# Patient Record
Sex: FEMALE | Race: WHITE | Employment: OTHER | URBAN - METROPOLITAN AREA
[De-identification: names, ages, dates, MRNs, and addresses within clinical notes are randomized per-mention and may not be internally consistent; named-entity substitution may affect disease eponyms.]

---

## 2017-02-10 ENCOUNTER — HOSPITAL ENCOUNTER (OUTPATIENT)
Dept: MAMMOGRAPHY | Age: 67
Discharge: HOME | End: 2017-02-10
Attending: FAMILY MEDICINE | Admitting: FAMILY MEDICINE

## 2017-02-10 DIAGNOSIS — Z12.31 ENCOUNTER FOR SCREENING MAMMOGRAM FOR BREAST CANCER: ICD-10-CM

## 2017-02-23 ENCOUNTER — OFFICE VISIT (OUTPATIENT)
Dept: FAMILY MEDICINE CLINIC | Age: 67
End: 2017-02-23

## 2017-02-23 VITALS
HEART RATE: 77 BPM | OXYGEN SATURATION: 98 % | BODY MASS INDEX: 31.69 KG/M2 | HEIGHT: 65 IN | DIASTOLIC BLOOD PRESSURE: 80 MMHG | SYSTOLIC BLOOD PRESSURE: 134 MMHG | RESPIRATION RATE: 16 BRPM | TEMPERATURE: 98 F | WEIGHT: 190.2 LBS

## 2017-02-23 DIAGNOSIS — D36.9 ADENOMATOUS POLYP: ICD-10-CM

## 2017-02-23 DIAGNOSIS — E78.00 PURE HYPERCHOLESTEROLEMIA: Chronic | ICD-10-CM

## 2017-02-23 DIAGNOSIS — I27.21 PAH (PULMONARY ARTERY HYPERTENSION) (HCC): ICD-10-CM

## 2017-02-23 DIAGNOSIS — I10 ESSENTIAL HYPERTENSION: Chronic | ICD-10-CM

## 2017-02-23 DIAGNOSIS — K21.9 GASTROESOPHAGEAL REFLUX DISEASE, ESOPHAGITIS PRESENCE NOT SPECIFIED: ICD-10-CM

## 2017-02-23 DIAGNOSIS — I73.00 RAYNAUD'S DISEASE WITHOUT GANGRENE: ICD-10-CM

## 2017-02-23 DIAGNOSIS — K57.30 SIGMOID DIVERTICULOSIS: ICD-10-CM

## 2017-02-23 DIAGNOSIS — E55.9 VITAMIN D DEFICIENCY: ICD-10-CM

## 2017-02-23 DIAGNOSIS — M34.1 CREST SYNDROME (HCC): ICD-10-CM

## 2017-02-23 DIAGNOSIS — M85.80 OSTEOPENIA: ICD-10-CM

## 2017-02-23 DIAGNOSIS — Z00.00 WELL ADULT EXAM: Primary | ICD-10-CM

## 2017-02-23 PROCEDURE — 99397 PER PM REEVAL EST PAT 65+ YR: CPT | Performed by: FAMILY MEDICINE

## 2017-02-23 RX ORDER — FUROSEMIDE 20 MG/1
20 TABLET ORAL DAILY PRN
Qty: 90 TABLET | Refills: 0 | Status: SHIPPED | OUTPATIENT
Start: 2017-02-23 | End: 2021-02-19 | Stop reason: SDUPTHER

## 2017-03-09 ENCOUNTER — OFFICE VISIT (OUTPATIENT)
Dept: RHEUMATOLOGY | Age: 67
End: 2017-03-09

## 2017-03-09 VITALS
SYSTOLIC BLOOD PRESSURE: 130 MMHG | BODY MASS INDEX: 31.69 KG/M2 | WEIGHT: 189 LBS | HEART RATE: 74 BPM | DIASTOLIC BLOOD PRESSURE: 84 MMHG

## 2017-03-09 DIAGNOSIS — M34.1 CREST SYNDROME (HCC): Primary | Chronic | ICD-10-CM

## 2017-03-09 PROCEDURE — 99213 OFFICE O/P EST LOW 20 MIN: CPT | Performed by: INTERNAL MEDICINE

## 2017-06-20 ENCOUNTER — OFFICE VISIT (OUTPATIENT)
Dept: CARDIOLOGY CLINIC | Age: 67
End: 2017-06-20

## 2017-06-20 ENCOUNTER — HOSPITAL ENCOUNTER (OUTPATIENT)
Dept: OTHER | Age: 67
Discharge: HOME | End: 2017-06-20
Attending: INTERNAL MEDICINE | Admitting: INTERNAL MEDICINE

## 2017-06-20 VITALS
WEIGHT: 184.74 LBS | SYSTOLIC BLOOD PRESSURE: 124 MMHG | HEIGHT: 65 IN | OXYGEN SATURATION: 100 % | HEART RATE: 84 BPM | DIASTOLIC BLOOD PRESSURE: 70 MMHG | BODY MASS INDEX: 30.78 KG/M2

## 2017-06-20 DIAGNOSIS — M34.1 CREST SYNDROME (HCC): Chronic | ICD-10-CM

## 2017-06-20 DIAGNOSIS — E78.00 PURE HYPERCHOLESTEROLEMIA: Chronic | ICD-10-CM

## 2017-06-20 DIAGNOSIS — R06.02 SOB (SHORTNESS OF BREATH): ICD-10-CM

## 2017-06-20 DIAGNOSIS — I27.21 PAH (PULMONARY ARTERY HYPERTENSION) (HCC): Chronic | ICD-10-CM

## 2017-06-20 DIAGNOSIS — I27.21 PAH (PULMONARY ARTERY HYPERTENSION) (HCC): Primary | Chronic | ICD-10-CM

## 2017-06-20 DIAGNOSIS — I10 ESSENTIAL HYPERTENSION: Chronic | ICD-10-CM

## 2017-06-20 LAB
A/G RATIO: 1.7 (ref 1.1–2.2)
ALBUMIN SERPL-MCNC: 4.5 G/DL (ref 3.4–5)
ALP BLD-CCNC: 81 U/L (ref 40–129)
ALT SERPL-CCNC: 17 U/L (ref 10–40)
ANION GAP SERPL CALCULATED.3IONS-SCNC: 17 MMOL/L (ref 3–16)
AST SERPL-CCNC: 19 U/L (ref 15–37)
BILIRUB SERPL-MCNC: 0.3 MG/DL (ref 0–1)
BUN BLDV-MCNC: 12 MG/DL (ref 7–20)
CALCIUM SERPL-MCNC: 10 MG/DL (ref 8.3–10.6)
CHLORIDE BLD-SCNC: 101 MMOL/L (ref 99–110)
CHOLESTEROL, TOTAL: 144 MG/DL (ref 0–199)
CO2: 27 MMOL/L (ref 21–32)
CREAT SERPL-MCNC: 0.7 MG/DL (ref 0.6–1.2)
GFR AFRICAN AMERICAN: >60
GFR NON-AFRICAN AMERICAN: >60
GLOBULIN: 2.6 G/DL
GLUCOSE BLD-MCNC: 99 MG/DL (ref 70–99)
HCT VFR BLD CALC: 40 % (ref 36–48)
HDLC SERPL-MCNC: 66 MG/DL (ref 40–60)
HEMOGLOBIN: 12.9 G/DL (ref 12–16)
LDL CHOLESTEROL CALCULATED: 62 MG/DL
MCH RBC QN AUTO: 28.3 PG (ref 26–34)
MCHC RBC AUTO-ENTMCNC: 32.1 G/DL (ref 31–36)
MCV RBC AUTO: 88 FL (ref 80–100)
PDW BLD-RTO: 14.3 % (ref 12.4–15.4)
PLATELET # BLD: 169 K/UL (ref 135–450)
PMV BLD AUTO: 9.9 FL (ref 5–10.5)
POTASSIUM SERPL-SCNC: 3.9 MMOL/L (ref 3.5–5.1)
PRO-BNP: 140 PG/ML (ref 0–124)
RBC # BLD: 4.55 M/UL (ref 4–5.2)
SODIUM BLD-SCNC: 145 MMOL/L (ref 136–145)
TOTAL PROTEIN: 7.1 G/DL (ref 6.4–8.2)
TRIGL SERPL-MCNC: 80 MG/DL (ref 0–150)
VLDLC SERPL CALC-MCNC: 16 MG/DL
WBC # BLD: 5.4 K/UL (ref 4–11)

## 2017-06-20 PROCEDURE — 99214 OFFICE O/P EST MOD 30 MIN: CPT | Performed by: INTERNAL MEDICINE

## 2017-07-26 ENCOUNTER — TELEPHONE (OUTPATIENT)
Dept: CARDIOLOGY CLINIC | Age: 67
End: 2017-07-26

## 2017-07-26 DIAGNOSIS — I73.00 RAYNAUD'S DISEASE WITHOUT GANGRENE: ICD-10-CM

## 2017-07-26 DIAGNOSIS — R06.02 SOB (SHORTNESS OF BREATH): ICD-10-CM

## 2017-07-26 DIAGNOSIS — I27.21 PAH (PULMONARY ARTERY HYPERTENSION) (HCC): ICD-10-CM

## 2017-07-26 DIAGNOSIS — I10 ESSENTIAL HYPERTENSION: ICD-10-CM

## 2017-07-26 DIAGNOSIS — M34.1 CREST SYNDROME (HCC): ICD-10-CM

## 2017-07-26 DIAGNOSIS — E78.00 PURE HYPERCHOLESTEROLEMIA: ICD-10-CM

## 2017-07-26 RX ORDER — AMLODIPINE BESYLATE 5 MG/1
5 TABLET ORAL DAILY
Qty: 90 TABLET | Refills: 3 | Status: SHIPPED | OUTPATIENT
Start: 2017-07-26 | End: 2018-10-16 | Stop reason: SDUPTHER

## 2017-08-10 ENCOUNTER — OFFICE VISIT (OUTPATIENT)
Dept: FAMILY MEDICINE CLINIC | Age: 67
End: 2017-08-10

## 2017-08-10 VITALS
WEIGHT: 184.2 LBS | RESPIRATION RATE: 16 BRPM | BODY MASS INDEX: 30.69 KG/M2 | SYSTOLIC BLOOD PRESSURE: 118 MMHG | OXYGEN SATURATION: 96 % | HEART RATE: 90 BPM | DIASTOLIC BLOOD PRESSURE: 72 MMHG | HEIGHT: 65 IN

## 2017-08-10 DIAGNOSIS — B96.89 ACUTE BACTERIAL SINUSITIS: Primary | ICD-10-CM

## 2017-08-10 DIAGNOSIS — J01.90 ACUTE BACTERIAL SINUSITIS: Primary | ICD-10-CM

## 2017-08-10 PROCEDURE — 99213 OFFICE O/P EST LOW 20 MIN: CPT | Performed by: REGISTERED NURSE

## 2017-08-10 RX ORDER — AMOXICILLIN AND CLAVULANATE POTASSIUM 875; 125 MG/1; MG/1
1 TABLET, FILM COATED ORAL 2 TIMES DAILY WITH MEALS
Qty: 20 TABLET | Refills: 0 | Status: SHIPPED | OUTPATIENT
Start: 2017-08-10 | End: 2017-08-20

## 2017-08-10 ASSESSMENT — ENCOUNTER SYMPTOMS
RHINORRHEA: 1
CHEST TIGHTNESS: 0
WHEEZING: 0
SORE THROAT: 1
SHORTNESS OF BREATH: 0
TROUBLE SWALLOWING: 0
SINUS PRESSURE: 1
COUGH: 1

## 2017-08-10 ASSESSMENT — PATIENT HEALTH QUESTIONNAIRE - PHQ9
1. LITTLE INTEREST OR PLEASURE IN DOING THINGS: 0
SUM OF ALL RESPONSES TO PHQ QUESTIONS 1-9: 0
2. FEELING DOWN, DEPRESSED OR HOPELESS: 0
SUM OF ALL RESPONSES TO PHQ9 QUESTIONS 1 & 2: 0

## 2017-09-14 ENCOUNTER — OFFICE VISIT (OUTPATIENT)
Dept: RHEUMATOLOGY | Age: 67
End: 2017-09-14

## 2017-09-14 VITALS
BODY MASS INDEX: 30.52 KG/M2 | DIASTOLIC BLOOD PRESSURE: 70 MMHG | WEIGHT: 183.4 LBS | HEART RATE: 68 BPM | SYSTOLIC BLOOD PRESSURE: 122 MMHG

## 2017-09-14 DIAGNOSIS — M34.1 CREST SYNDROME (HCC): Primary | Chronic | ICD-10-CM

## 2017-09-14 PROCEDURE — 99213 OFFICE O/P EST LOW 20 MIN: CPT | Performed by: INTERNAL MEDICINE

## 2017-11-02 DIAGNOSIS — I10 ESSENTIAL HYPERTENSION: ICD-10-CM

## 2017-11-02 DIAGNOSIS — I27.21 PAH (PULMONARY ARTERY HYPERTENSION) (HCC): ICD-10-CM

## 2017-11-02 DIAGNOSIS — E78.00 PURE HYPERCHOLESTEROLEMIA: ICD-10-CM

## 2017-11-02 DIAGNOSIS — R06.02 SOB (SHORTNESS OF BREATH): ICD-10-CM

## 2017-11-02 DIAGNOSIS — M34.1 CREST SYNDROME (HCC): ICD-10-CM

## 2017-11-02 DIAGNOSIS — I73.00 RAYNAUD'S DISEASE WITHOUT GANGRENE: ICD-10-CM

## 2017-11-02 RX ORDER — VALSARTAN AND HYDROCHLOROTHIAZIDE 160; 25 MG/1; MG/1
1 TABLET ORAL DAILY
Qty: 90 TABLET | Refills: 1 | Status: SHIPPED | OUTPATIENT
Start: 2017-11-02 | End: 2018-06-05 | Stop reason: SDUPTHER

## 2017-11-02 RX ORDER — ATORVASTATIN CALCIUM 10 MG/1
10 TABLET, FILM COATED ORAL DAILY
Qty: 90 TABLET | Refills: 1 | Status: SHIPPED | OUTPATIENT
Start: 2017-11-02 | End: 2018-06-05 | Stop reason: SDUPTHER

## 2017-12-21 ENCOUNTER — OFFICE VISIT (OUTPATIENT)
Dept: CARDIOLOGY CLINIC | Age: 67
End: 2017-12-21

## 2017-12-21 ENCOUNTER — HOSPITAL ENCOUNTER (OUTPATIENT)
Dept: NON INVASIVE DIAGNOSTICS | Age: 67
Discharge: HOME | End: 2017-12-21
Attending: INTERNAL MEDICINE | Admitting: INTERNAL MEDICINE

## 2017-12-21 VITALS
HEIGHT: 65 IN | HEART RATE: 74 BPM | BODY MASS INDEX: 31.09 KG/M2 | DIASTOLIC BLOOD PRESSURE: 68 MMHG | RESPIRATION RATE: 16 BRPM | WEIGHT: 186.6 LBS | SYSTOLIC BLOOD PRESSURE: 130 MMHG | OXYGEN SATURATION: 98 %

## 2017-12-21 DIAGNOSIS — I10 ESSENTIAL HYPERTENSION: ICD-10-CM

## 2017-12-21 DIAGNOSIS — M34.1 CREST SYNDROME (HCC): ICD-10-CM

## 2017-12-21 DIAGNOSIS — E55.9 VITAMIN D INSUFFICIENCY: ICD-10-CM

## 2017-12-21 DIAGNOSIS — I27.21 PAH (PULMONARY ARTERY HYPERTENSION) (HCC): Primary | ICD-10-CM

## 2017-12-21 DIAGNOSIS — R06.02 SOB (SHORTNESS OF BREATH): ICD-10-CM

## 2017-12-21 DIAGNOSIS — I73.00 RAYNAUD'S DISEASE WITHOUT GANGRENE: ICD-10-CM

## 2017-12-21 DIAGNOSIS — E78.00 PURE HYPERCHOLESTEROLEMIA: ICD-10-CM

## 2017-12-21 LAB
LV EF: 60 %
LVEF MODALITY: NORMAL

## 2017-12-21 PROCEDURE — 99214 OFFICE O/P EST MOD 30 MIN: CPT | Performed by: INTERNAL MEDICINE

## 2017-12-21 NOTE — PROGRESS NOTES
Administered Date(s) Administered    Influenza Virus Vaccine 10/15/2014, 11/30/2015    Influenza, High Dose 12/12/2016    Pneumococcal 13-valent Conjugate ([id number]) 08/13/2015    Pneumococcal Polysaccharide ([id number]) 04/26/2013, 12/12/2016    Tdap (Boostrix, Adacel) 04/26/2013    Zoster 06/11/2013       Patient Active Problem List   Diagnosis    Pure hypercholesterolemia    Raynauds syndrome    GERD (gastroesophageal reflux disease)    Hypertension    CREST syndrome ([address]    PAH (pulmonary artery hypertension)    Vitamin D deficiency    Osteopenia    Sigmoid diverticulosis    Adenomatous polyp       Past Medical History:   Diagnosis Date    Adenomatous polyp 7/17/2015    negative for high grade dysplasiaManegold    CREST syndrome ([address]     Hypertension     Osteopenia 7/22/10    LS -1.15    Pure hypercholesterolemia     Raynauds syndrome     [place]    Sigmoid diverticulosis 7/17/2015    [place]    Vitamin D deficiency      Past Surgical History:   Procedure Laterality Date    BREAST BIOPSY  2008    right breast    CARDIAC CATHETERIZATION  11/27/12    Right Heart Cath     Family History   Problem Relation Age of Onset    Heart Disease Mother 80     coronary stents    Cancer Father 46     bladder cancer, he was a smoker    High Cholesterol Sister      Social History     Social History    Marital status:      Spouse name: N/A    Number of children: N/A    Years of education: N/A     Occupational History    Not on file. Social History Main Topics    Smoking status: Never Smoker    Smokeless tobacco: Never Used      Comment: avoid tobacco    Alcohol use 0.0 oz/week      Comment: Has an occasional drink on holidays.  Drug use: No    Sexual activity: Yes     Partners: Male     Other Topics Concern    Not on file     Social History Narrative    No narrative on file     Review of Systems:   · Constitutional: there has been no unanticipated weight loss.  There's H2O.  · Regular rate and rhythm, normal W9J5, 2/6 systolic murmur at USB, no g/r/c  · Peripheral pulses are symmetrical and full  · There is no clubbing, cyanosis of the extremities. · No edema in ankles  · Pedal Pulses: 2+ and equal   Abdomen:  · No masses or tenderness  · Liver/Spleen: No Abnormalities Noted  Neurological/Psychiatric:  · Alert and oriented in all spheres  · Moves all extremities well  · Exhibits normal gait balance and coordination  · No abnormalities of mood, affect, memory, mentation, or behavior are noted    Lab Data:  CBC:   Lab Results   Component Value Date    WBC 5.4 06/20/2017    WBC 5.7 12/02/2016    WBC 6.0 05/24/2016    RBC 4.55 06/20/2017    RBC 4.35 12/02/2016    RBC 4.55 05/24/2016    HGB 12.9 06/20/2017    HGB 12.6 12/02/2016    HGB 13.0 05/24/2016    HCT 40.0 06/20/2017    HCT 38.0 12/02/2016    HCT 39.6 05/24/2016    MCV 88.0 06/20/2017    MCV 87.4 12/02/2016    MCV 87.0 05/24/2016    RDW 14.3 06/20/2017    RDW 13.7 12/02/2016    RDW 14.0 05/24/2016     06/20/2017     12/02/2016     05/24/2016     BMP:   Lab Results   Component Value Date     06/20/2017     12/02/2016     05/24/2016    K 3.9 06/20/2017    K 4.1 12/02/2016    K 3.8 05/24/2016     06/20/2017     12/02/2016     05/24/2016    CO2 27 06/20/2017    CO2 27 12/02/2016    CO2 29 05/24/2016    PHOS 3.9 11/29/2012    BUN 12 06/20/2017    BUN 14 12/02/2016    BUN 15 05/24/2016    CREATININE 0.7 06/20/2017    CREATININE 0.7 12/02/2016    CREATININE 0.7 09/09/2016     BNP:   Lab Results   Component Value Date    BNP 40 03/03/2014    BNP 28 11/29/2012    BNP 52 08/10/2012     FASTING LIPID PANEL:  Lab Results   Component Value Date    CHOL 144 06/20/2017    HDL 66 06/20/2017    HDL 65 01/23/2012    TRIG 80 06/20/2017     Assessment:  1. PAH- 5/23/12 echo: Normal LV systolic function. Mild mitral and tricuspid regurgitation.  Elevated pulmonary pressures with an estimated right ventricular systolic pressure of 48 mmHg. 76/1  RVSP 42mmHg      6 minute vergara walk distance > 450 m 3/13  On 8/28/2015 echo RVSP is 35 mg HG was on Letaris but stopped due to swelling and does not want to take another medication in it's place. She is stable. Echo 12/16 was 55% RVSP 34  11/26/9/12 RHC- mild PAH. -no ankle swelling  2. CREST- 11/26/12 RHC, recommended starting Endothelin antagonist for treatment associated with CREST syndrome. 3. HTN-   /68 (Site: Right Arm, Position: Sitting, Cuff Size: Medium Adult)   Pulse 74   Resp 16   Ht 5' 5\" (1.651 m)   Wt 186 lb 9.6 oz (84.6 kg)   SpO2 98%   BMI 31.05 kg/m²  Stable BP and HR.  4. Hyperlipidemia- Currently takes Lipitor stable . 5. Raynaud's Syndrome- Followed by Dr. Orlando Salazar. Plan:   1. Same medications. 2. Labs today lipid, cbc, cmp, vit d and bnp.   3. RTO in 12 months with Echo same day. 4. Call with any concerns to the CHF hotline [phone]. I appreciate the opportunity of cooperating in the care of this individual.    Mary Anne Jeffrey MD.

## 2018-01-23 ENCOUNTER — HOSPITAL ENCOUNTER (OUTPATIENT)
Dept: OTHER | Age: 68
Discharge: HOME | End: 2018-01-23
Attending: INTERNAL MEDICINE | Admitting: INTERNAL MEDICINE

## 2018-01-23 DIAGNOSIS — E55.9 VITAMIN D INSUFFICIENCY: ICD-10-CM

## 2018-01-23 DIAGNOSIS — R06.02 SOB (SHORTNESS OF BREATH): ICD-10-CM

## 2018-01-23 DIAGNOSIS — M34.1 CREST SYNDROME (HCC): ICD-10-CM

## 2018-01-23 DIAGNOSIS — I73.00 RAYNAUD'S DISEASE WITHOUT GANGRENE: ICD-10-CM

## 2018-01-23 DIAGNOSIS — I10 ESSENTIAL HYPERTENSION: ICD-10-CM

## 2018-01-23 DIAGNOSIS — I27.21 PAH (PULMONARY ARTERY HYPERTENSION) (HCC): ICD-10-CM

## 2018-01-23 DIAGNOSIS — E78.00 PURE HYPERCHOLESTEROLEMIA: ICD-10-CM

## 2018-01-23 LAB
A/G RATIO: 1.4 (ref 1.1–2.2)
ALBUMIN SERPL-MCNC: 4.2 G/DL (ref 3.4–5)
ALP BLD-CCNC: 70 U/L (ref 40–129)
ALT SERPL-CCNC: 15 U/L (ref 10–40)
ANION GAP SERPL CALCULATED.3IONS-SCNC: 10 MMOL/L (ref 3–16)
AST SERPL-CCNC: 17 U/L (ref 15–37)
BILIRUB SERPL-MCNC: 0.4 MG/DL (ref 0–1)
BUN BLDV-MCNC: 13 MG/DL (ref 7–20)
CALCIUM SERPL-MCNC: 9.8 MG/DL (ref 8.3–10.6)
CHLORIDE BLD-SCNC: 103 MMOL/L (ref 99–110)
CHOLESTEROL, TOTAL: 143 MG/DL (ref 0–199)
CO2: 30 MMOL/L (ref 21–32)
CREAT SERPL-MCNC: 0.7 MG/DL (ref 0.6–1.2)
GFR AFRICAN AMERICAN: >60
GFR NON-AFRICAN AMERICAN: >60
GLOBULIN: 3 G/DL
GLUCOSE BLD-MCNC: 98 MG/DL (ref 70–99)
HCT VFR BLD CALC: 37.9 % (ref 36–48)
HDLC SERPL-MCNC: 63 MG/DL (ref 40–60)
HEMOGLOBIN: 12.6 G/DL (ref 12–16)
LDL CHOLESTEROL CALCULATED: 59 MG/DL
MCH RBC QN AUTO: 28.9 PG (ref 26–34)
MCHC RBC AUTO-ENTMCNC: 33.2 G/DL (ref 31–36)
MCV RBC AUTO: 87.1 FL (ref 80–100)
PDW BLD-RTO: 13.7 % (ref 12.4–15.4)
PLATELET # BLD: 191 K/UL (ref 135–450)
PMV BLD AUTO: 9.4 FL (ref 5–10.5)
POTASSIUM SERPL-SCNC: 4.1 MMOL/L (ref 3.5–5.1)
PRO-BNP: 82 PG/ML (ref 0–124)
RBC # BLD: 4.35 M/UL (ref 4–5.2)
SODIUM BLD-SCNC: 143 MMOL/L (ref 136–145)
TOTAL PROTEIN: 7.2 G/DL (ref 6.4–8.2)
TRIGL SERPL-MCNC: 105 MG/DL (ref 0–150)
VITAMIN D 25-HYDROXY: 28.1 NG/ML
VLDLC SERPL CALC-MCNC: 21 MG/DL
WBC # BLD: 6.8 K/UL (ref 4–11)

## 2018-02-01 ENCOUNTER — TELEPHONE (OUTPATIENT)
Dept: FAMILY MEDICINE CLINIC | Age: 68
End: 2018-02-01

## 2018-02-14 ENCOUNTER — HOSPITAL ENCOUNTER (OUTPATIENT)
Dept: MAMMOGRAPHY | Age: 68
Discharge: HOME | End: 2018-02-14
Attending: FAMILY MEDICINE | Admitting: FAMILY MEDICINE

## 2018-02-14 DIAGNOSIS — Z12.31 ENCOUNTER FOR SCREENING MAMMOGRAM FOR BREAST CANCER: ICD-10-CM

## 2018-02-27 ENCOUNTER — OFFICE VISIT (OUTPATIENT)
Dept: RHEUMATOLOGY | Age: 68
End: 2018-02-27

## 2018-02-27 VITALS
WEIGHT: 186 LBS | SYSTOLIC BLOOD PRESSURE: 128 MMHG | BODY MASS INDEX: 30.99 KG/M2 | HEIGHT: 65 IN | DIASTOLIC BLOOD PRESSURE: 70 MMHG | HEART RATE: 72 BPM

## 2018-02-27 DIAGNOSIS — M34.1 CREST SYNDROME (HCC): Primary | Chronic | ICD-10-CM

## 2018-02-27 PROCEDURE — 99213 OFFICE O/P EST LOW 20 MIN: CPT | Performed by: INTERNAL MEDICINE

## 2018-02-27 NOTE — PROGRESS NOTES
Subjective:      Patient ID: Keisha Holbrook is a 69 y.o. female. HPI  The patient returns for follow-up of crest syndrome. Her cardiologist feels that her pulmonary hypertension is stable. She continues on Norvasc 5 mg a day which seems to control her Raynaud's. No significant difficulty with ADLs  Review of Systems   Mild dyspnea on exertion; occasional reflux. Prior to Visit Medications    Medication Sig Taking? Authorizing Provider   atorvastatin (LIPITOR) 10 MG tablet Take 1 tablet by mouth daily Yes Mehnaz Ruiz MD   valsartan-hydrochlorothiazide (DIOVAN-HCT) 160-25 MG per tablet Take 1 tablet by mouth daily Yes Mehnaz Ruiz MD   amLODIPine (NORVASC) 5 MG tablet Take 1 tablet by mouth daily Yes Mehnaz Ruiz MD   furosemide (LASIX) 20 MG tablet Take 1 tablet by mouth daily as needed (swelling) Yes Gerard Dodd MD   vitamin E 400 UNIT capsule Take 400 Units by mouth daily Yes Historical Provider, MD   Cholecalciferol (VITAMIN D3) 1000 UNITS CAPS   Take by mouth daily  Yes Promise Hinton MD   Garlic 7977 MG TABS Take  by mouth daily. Yes Historical Provider, MD   Coenzyme Q10 (CO Q-10) 100 MG CAPS Take  by mouth daily. Yes Historical Provider, MD   Cinnamon 500 MG TABS Take  by mouth daily. Yes Historical Provider, MD   calcium carbonate 600 MG TABS tablet Take 1 tablet by mouth daily. Yes Historical Provider, MD   Ginkgo Biloba 40 MG TABS Take 40 mg by mouth daily. Yes Historical Provider, MD   Multiple Vitamin (MULTIVITAMIN PO) Take  by mouth daily. Yes Historical Provider, MD   Pyridoxine HCl (VITAMIN B-6 PO) Take  by mouth daily. Yes Historical Provider, MD   fish oil-omega-3 fatty acids 1000 MG capsule Take 1 g by mouth daily  Yes Historical Provider, MD   vitamin B-12 (CYANOCOBALAMIN) 100 MCG tablet Take 100 mcg by mouth daily. Yes Historical Provider, MD   Flaxseed, Linseed, (FLAX SEED OIL) 1000 MG CAPS Take 1,000 mg by mouth daily.    Yes Historical Provider, MD   Ascorbic Acid (VITAMIN

## 2018-06-05 DIAGNOSIS — M34.1 CREST SYNDROME (HCC): ICD-10-CM

## 2018-06-05 DIAGNOSIS — I27.21 PAH (PULMONARY ARTERY HYPERTENSION) (HCC): ICD-10-CM

## 2018-06-05 DIAGNOSIS — E78.00 PURE HYPERCHOLESTEROLEMIA: ICD-10-CM

## 2018-06-05 DIAGNOSIS — R06.02 SOB (SHORTNESS OF BREATH): ICD-10-CM

## 2018-06-05 DIAGNOSIS — I73.00 RAYNAUD'S DISEASE WITHOUT GANGRENE: ICD-10-CM

## 2018-06-05 DIAGNOSIS — I10 ESSENTIAL HYPERTENSION: ICD-10-CM

## 2018-06-06 RX ORDER — VALSARTAN AND HYDROCHLOROTHIAZIDE 160; 25 MG/1; MG/1
TABLET ORAL
Qty: 90 TABLET | Refills: 3 | Status: SHIPPED | OUTPATIENT
Start: 2018-06-06 | End: 2018-08-28 | Stop reason: ALTCHOICE

## 2018-06-06 RX ORDER — ATORVASTATIN CALCIUM 10 MG/1
TABLET, FILM COATED ORAL
Qty: 90 TABLET | Refills: 3 | Status: SHIPPED | OUTPATIENT
Start: 2018-06-06 | End: 2019-07-30 | Stop reason: SDUPTHER

## 2018-07-02 ENCOUNTER — OFFICE VISIT (OUTPATIENT)
Dept: FAMILY MEDICINE CLINIC | Age: 68
End: 2018-07-02

## 2018-07-02 VITALS
WEIGHT: 184.4 LBS | SYSTOLIC BLOOD PRESSURE: 122 MMHG | BODY MASS INDEX: 30.72 KG/M2 | HEART RATE: 78 BPM | HEIGHT: 65 IN | RESPIRATION RATE: 12 BRPM | DIASTOLIC BLOOD PRESSURE: 80 MMHG

## 2018-07-02 DIAGNOSIS — R93.6 ABNORMAL FINDINGS ON DIAGNOSTIC IMAGING OF LIMBS: ICD-10-CM

## 2018-07-02 DIAGNOSIS — Z00.00 WELL ADULT EXAM: Primary | ICD-10-CM

## 2018-07-02 DIAGNOSIS — Z00.00 ROUTINE GENERAL MEDICAL EXAMINATION AT A HEALTH CARE FACILITY: ICD-10-CM

## 2018-07-02 DIAGNOSIS — M34.1 CREST SYNDROME (HCC): Chronic | ICD-10-CM

## 2018-07-02 DIAGNOSIS — M85.80 OSTEOPENIA, UNSPECIFIED LOCATION: ICD-10-CM

## 2018-07-02 DIAGNOSIS — R13.12 OROPHARYNGEAL DYSPHAGIA: ICD-10-CM

## 2018-07-02 PROCEDURE — G0438 PPPS, INITIAL VISIT: HCPCS | Performed by: FAMILY MEDICINE

## 2018-07-02 ASSESSMENT — LIFESTYLE VARIABLES
HOW OFTEN DURING THE LAST YEAR HAVE YOU BEEN UNABLE TO REMEMBER WHAT HAPPENED THE NIGHT BEFORE BECAUSE YOU HAD BEEN DRINKING: 0
HOW OFTEN DURING THE LAST YEAR HAVE YOU FOUND THAT YOU WERE NOT ABLE TO STOP DRINKING ONCE YOU HAD STARTED: 0
HOW OFTEN DURING THE LAST YEAR HAVE YOU HAD A FEELING OF GUILT OR REMORSE AFTER DRINKING: 0
AUDIT-C TOTAL SCORE: 1
HOW OFTEN DURING THE LAST YEAR HAVE YOU FAILED TO DO WHAT WAS NORMALLY EXPECTED FROM YOU BECAUSE OF DRINKING: 0
HOW OFTEN DO YOU HAVE A DRINK CONTAINING ALCOHOL: 1
HOW MANY STANDARD DRINKS CONTAINING ALCOHOL DO YOU HAVE ON A TYPICAL DAY: 0
HOW OFTEN DURING THE LAST YEAR HAVE YOU NEEDED AN ALCOHOLIC DRINK FIRST THING IN THE MORNING TO GET YOURSELF GOING AFTER A NIGHT OF HEAVY DRINKING: 0
HOW OFTEN DO YOU HAVE SIX OR MORE DRINKS ON ONE OCCASION: 0

## 2018-07-02 ASSESSMENT — PATIENT HEALTH QUESTIONNAIRE - PHQ9: SUM OF ALL RESPONSES TO PHQ QUESTIONS 1-9: 0

## 2018-07-02 ASSESSMENT — ANXIETY QUESTIONNAIRES: GAD7 TOTAL SCORE: 0

## 2018-07-02 NOTE — PATIENT INSTRUCTIONS
Personalized Preventive Plan for Dorothy Bound - 7/2/2018  Medicare offers a range of preventive health benefits. Some of the tests and screenings are paid in full while other may be subject to a deductible, co-insurance, and/or copay. Some of these benefits include a comprehensive review of your medical history including lifestyle, illnesses that may run in your family, and various assessments and screenings as appropriate. After reviewing your medical record and screening and assessments performed today your provider may have ordered immunizations, labs, imaging, and/or referrals for you. A list of these orders (if applicable) as well as your Preventive Care list are included within your After Visit Summary for your review. Other Preventive Recommendations:    · A preventive eye exam performed by an eye specialist is recommended every 1-2 years to screen for glaucoma; cataracts, macular degeneration, and other eye disorders. · A preventive dental visit is recommended every 6 months. · Try to get at least 150 minutes of exercise per week or 10,000 steps per day on a pedometer . · Order or download the FREE \"Exercise & Physical Activity: Your Everyday Guide\" from [employer]. Call 7-690.287.4922 or search [employer] online. · You need 5323-5622 mg of calcium and 0950-9569 IU of vitamin D per day. It is possible to meet your calcium requirement with diet alone, but a vitamin D supplement is usually necessary to meet this goal.  · When exposed to the sun, use a sunscreen that protects against both UVA and UVB radiation with an SPF of 30 or greater. Reapply every 2 to 3 hours or after sweating, drying off with a towel, or swimming. · Always wear a seat belt when traveling in a car. Always wear a helmet when riding a bicycle or motorcycle.

## 2018-07-02 NOTE — PROGRESS NOTES
Medicare Annual Wellness Visit  Name: Mar Moran Date: 2018   MRN: [id number] Sex: Female   Age: 69 y.o. Ethnicity: Non-/Non    : 1950 Race: Laura Peoples is here for Medicare AWV ([address] )    Screenings for behavioral, psychosocial and functional/safety risks, and cognitive dysfunction are all negative except as indicated below. These results, as well as other patient data from the [address] form, are documented in Flowsheets linked to this Encounter. BP Readings from Last 3 Encounters:   18 122/80   18 128/70   17 130/68     Pulse Readings from Last 3 Encounters:   18 78   18 72   17 74     Wt Readings from Last 3 Encounters:   18 184 lb 6.4 oz (83.6 kg)   18 186 lb (84.4 kg)   17 186 lb 9.6 oz (84.6 kg)     Gets labs done each year w/ dr. Airam Gold, cardiology  occ dysphagia - things occ get stuck -   raynauds worse in winter - heart well overall -   Lipids/ bp okay - vit d sl low in winter - on same dose vit d  Staying active somewhat - walks periodically. Good balanced diet. Up 1x/ night to urinate - o/w sleeping okay. No swelling generally - furosemide - doesn't use very often  On 5/d norvasc. Urine frequency normal - fair amt of fluids through day. No Known Allergies  Prior to Visit Medications    Medication Sig Taking?  Authorizing Provider   valsartan-hydrochlorothiazide (DIOVAN-HCT) 160-25 MG per tablet TAKE 1 TABLET DAILY Yes Melo Forman MD   atorvastatin (LIPITOR) 10 MG tablet TAKE 1 TABLET DAILY Yes Melo Forman MD   amLODIPine (NORVASC) 5 MG tablet Take 1 tablet by mouth daily Yes Melo Forman MD   furosemide (LASIX) 20 MG tablet Take 1 tablet by mouth daily as needed (swelling) Yes Caden Prescott MD   vitamin E 400 UNIT capsule Take 400 Units by mouth daily Yes Historical Provider, MD   Cholecalciferol (VITAMIN D3) 1000 UNITS CAPS   Take by mouth daily Yes Martita Hedrick MD   Garlic 0157 MG TABS Take  by mouth daily. Yes Historical Provider, MD   Coenzyme Q10 (CO Q-10) 100 MG CAPS Take  by mouth daily. Yes Historical Provider, MD   Cinnamon 500 MG TABS Take  by mouth daily. Yes Historical Provider, MD   calcium carbonate 600 MG TABS tablet Take 1 tablet by mouth daily. Yes Historical Provider, MD   Ginkgo Biloba 40 MG TABS Take 40 mg by mouth daily. Yes Historical Provider, MD   Multiple Vitamin (MULTIVITAMIN PO) Take  by mouth daily. Yes Historical Provider, MD   Pyridoxine HCl (VITAMIN B-6 PO) Take  by mouth daily. Yes Historical Provider, MD   fish oil-omega-3 fatty acids 1000 MG capsule Take 1 g by mouth daily  Yes Historical Provider, MD   vitamin B-12 (CYANOCOBALAMIN) 100 MCG tablet Take 100 mcg by mouth daily. Yes Historical Provider, MD   Flaxseed, Linseed, (FLAX SEED OIL) 1000 MG CAPS Take 1,000 mg by mouth daily. Yes Historical Provider, MD   Ascorbic Acid (VITAMIN C) 500 MG tablet Take 500 mg by mouth daily.    Yes Historical Provider, MD   aspirin 81 MG EC tablet Take 81 mg by mouth daily  Yes Historical Provider, MD     Past Medical History:   Diagnosis Date    Adenomatous polyp 7/17/2015    negative for high grade dysplasiaManegold    [address] [address]     Hypertension     Osteopenia 7/22/10    LS -1.15    Pure hypercholesterolemia     Raynauds syndrome     [place]    Sigmoid diverticulosis 7/17/2015    [place]    Vitamin D deficiency      Past Surgical History:   Procedure Laterality Date    BREAST BIOPSY  2008    right breast    CARDIAC CATHETERIZATION  11/27/12    Right Heart Cath     Family History   Problem Relation Age of Onset    Heart Disease Mother 80        coronary stents    Cancer Father 46        bladder cancer, he was a smoker    High Cholesterol Sister        CareTeam (Including outside providers/suppliers regularly involved in providing care):   Patient Care Team:  Jonatan Davis MD as PCP - General  Марина Senior Max Haas MD as Consulting Physician (Cardiology)    Wt Readings from Last 3 Encounters:   07/02/18 184 lb 6.4 oz (83.6 kg)   02/27/18 186 lb (84.4 kg)   12/21/17 186 lb 9.6 oz (84.6 kg)     Vitals:    07/02/18 1420   BP: 122/80   Site: Left Arm   Position: Sitting   Cuff Size: Medium Adult   Pulse: 78   Resp: 12   Weight: 184 lb 6.4 oz (83.6 kg)   Height: 5' 5\" (1.651 m)         Patient's complete Health Risk Assessment and screening values have been reviewed and are found in Flowsheets. The following problems were reviewed today and where indicated follow up appointments were made and/or referrals ordered. Positive Risk Factor Screenings with Interventions:     General Health:  General  In general, how would you say your health is?: Very Good  In the past 7 days, have you experienced any of the following?: None of These  Do you get the social and emotional support that you need?: Yes  Do you have a Living Will?: (!) No  General Health Risk Interventions:  · encourage living will    Health Habits/Nutrition:  Health Habits/Nutrition  Do you exercise for at least 20 minutes 2-3 times per week?: (!) No  Have you lost any weight without trying in the past 3 months?: No  Do you eat fewer than 2 meals per day?: No  Have you seen a dentist within the past year?: Yes  Body mass index is 30.69 kg/m².   Health Habits/Nutrition Interventions:  · exercises sporadically - regular exercise encouraged    Safety:  Safety  Do you have working smoke detectors?: Yes  Have all throw rugs been removed or fastened?: (!) No  Do you have non-slip mats in all bathtubs?: Yes  Do all of your stairways have a railing or banister?: Yes  Are your doorways, halls and stairs free of clutter?: Yes  Do you always fasten your seatbelt when you are in a car?: Yes  Safety Interventions:  · safety issues reviewed    Personalized Preventive Plan   Current Health Maintenance Status  Immunization History   Administered Date(s) Administered    Influenza

## 2018-07-24 ENCOUNTER — TELEPHONE (OUTPATIENT)
Dept: CARDIOLOGY CLINIC | Age: 68
End: 2018-07-24

## 2018-07-25 RX ORDER — IRBESARTAN 150 MG/1
150 TABLET ORAL NIGHTLY
COMMUNITY
End: 2018-07-25 | Stop reason: SDUPTHER

## 2018-07-25 RX ORDER — HYDROCHLOROTHIAZIDE 25 MG/1
25 TABLET ORAL DAILY
Qty: 90 TABLET | Refills: 1 | Status: SHIPPED | OUTPATIENT
Start: 2018-07-25 | End: 2018-12-10 | Stop reason: ALTCHOICE

## 2018-07-25 RX ORDER — IRBESARTAN 150 MG/1
150 TABLET ORAL NIGHTLY
Qty: 90 TABLET | Refills: 1 | Status: SHIPPED | OUTPATIENT
Start: 2018-07-25 | End: 2019-01-11 | Stop reason: SDUPTHER

## 2018-07-25 RX ORDER — HYDROCHLOROTHIAZIDE 25 MG/1
25 TABLET ORAL DAILY
COMMUNITY
End: 2018-07-25 | Stop reason: SDUPTHER

## 2018-07-25 NOTE — TELEPHONE ENCOUNTER
Spoke with pt today. She was notified by [employer] that her medication is part of the recalled medication from [facility]. Per [facility] instructions, a new prescription has been sent to [facility]  Irbesartan 150mg and HCTZ 25mg to replace her Valsartan HCTZ.

## 2018-08-28 ENCOUNTER — OFFICE VISIT (OUTPATIENT)
Dept: RHEUMATOLOGY | Age: 68
End: 2018-08-28

## 2018-08-28 VITALS
HEART RATE: 72 BPM | WEIGHT: 184.38 LBS | BODY MASS INDEX: 30.72 KG/M2 | DIASTOLIC BLOOD PRESSURE: 80 MMHG | HEIGHT: 65 IN | SYSTOLIC BLOOD PRESSURE: 128 MMHG

## 2018-08-28 DIAGNOSIS — M34.1 CREST SYNDROME (HCC): Primary | Chronic | ICD-10-CM

## 2018-08-28 LAB
CREAT SERPL-MCNC: 0.7 MG/DL (ref 0.6–1.2)
GFR AFRICAN AMERICAN: >60
GFR NON-AFRICAN AMERICAN: >60

## 2018-08-28 PROCEDURE — 99213 OFFICE O/P EST LOW 20 MIN: CPT | Performed by: INTERNAL MEDICINE

## 2018-08-28 NOTE — PROGRESS NOTES
Subjective:      Patient ID: Kameron Davidson is a 69 y.o. female. HPI  Patient returns for follow-up of crest syndrome. She feels well on Norvasc 5 mg a day. She has occasional difficulty swallowing. Review of Systems  Occasional Raynaud's. Denies dyspnea on exertion  Objective:   Physical Exam /80   Pulse 72   Ht 5' 5\" (1.651 m)   Wt 184 lb 6 oz (83.6 kg)   LMP  (Exact Date)   BMI 30.68 kg/m²   Alert female in no acute distress lungs clear to auscultation cardiovascular exam normal sinus rhythm musculoskeletal exam revealed sclerodactyly skin occasional telangiectasias. No digital ulcerations    Assessment:      Crest      Plan:      Patient's renal function will be checked. She'll continue on Norvasc. I'll see her back in 6 months time.         Lc Ordaz MD

## 2018-09-11 ENCOUNTER — HOSPITAL ENCOUNTER (OUTPATIENT)
Dept: GENERAL RADIOLOGY | Age: 68
Discharge: HOME | End: 2018-09-11
Attending: FAMILY MEDICINE | Admitting: FAMILY MEDICINE

## 2018-09-11 DIAGNOSIS — R93.6 ABNORMAL FINDINGS ON DIAGNOSTIC IMAGING OF LIMBS: ICD-10-CM

## 2018-09-11 DIAGNOSIS — M85.80 OSTEOPENIA, UNSPECIFIED LOCATION: ICD-10-CM

## 2018-10-16 DIAGNOSIS — I73.00 RAYNAUD'S DISEASE WITHOUT GANGRENE: ICD-10-CM

## 2018-10-16 DIAGNOSIS — E78.00 PURE HYPERCHOLESTEROLEMIA: ICD-10-CM

## 2018-10-16 DIAGNOSIS — I10 ESSENTIAL HYPERTENSION: ICD-10-CM

## 2018-10-16 DIAGNOSIS — M34.1 CREST SYNDROME (HCC): ICD-10-CM

## 2018-10-16 DIAGNOSIS — R06.02 SOB (SHORTNESS OF BREATH): ICD-10-CM

## 2018-10-16 DIAGNOSIS — I27.21 PAH (PULMONARY ARTERY HYPERTENSION) (HCC): ICD-10-CM

## 2018-10-17 RX ORDER — AMLODIPINE BESYLATE 5 MG/1
TABLET ORAL
Qty: 90 TABLET | Refills: 3 | Status: SHIPPED | OUTPATIENT
Start: 2018-10-17 | End: 2019-11-19 | Stop reason: SDUPTHER

## 2018-11-02 ENCOUNTER — TELEPHONE (OUTPATIENT)
Dept: CARDIOLOGY CLINIC | Age: 68
End: 2018-11-02

## 2018-11-02 NOTE — TELEPHONE ENCOUNTER
Spoke with pt . This is the first phone call regarding a recall on Irbesartan. Recommended she contact pharmacy to see if her medication is involved in recall--call if needs new prescription. Transferred to scheduling for yearly appt.

## 2018-12-10 ENCOUNTER — HOSPITAL ENCOUNTER (OUTPATIENT)
Dept: NON INVASIVE DIAGNOSTICS | Age: 68
Discharge: HOME | End: 2018-12-10
Payer: COMMERCIAL

## 2018-12-10 ENCOUNTER — OFFICE VISIT (OUTPATIENT)
Dept: CARDIOLOGY CLINIC | Age: 68
End: 2018-12-10
Payer: COMMERCIAL

## 2018-12-10 VITALS
HEART RATE: 80 BPM | SYSTOLIC BLOOD PRESSURE: 130 MMHG | HEIGHT: 64 IN | WEIGHT: 191 LBS | DIASTOLIC BLOOD PRESSURE: 78 MMHG | BODY MASS INDEX: 32.61 KG/M2

## 2018-12-10 DIAGNOSIS — E55.9 VITAMIN D INSUFFICIENCY: ICD-10-CM

## 2018-12-10 DIAGNOSIS — R06.02 SOB (SHORTNESS OF BREATH): ICD-10-CM

## 2018-12-10 DIAGNOSIS — I27.21 PAH (PULMONARY ARTERY HYPERTENSION) (HCC): ICD-10-CM

## 2018-12-10 DIAGNOSIS — I10 ESSENTIAL HYPERTENSION: ICD-10-CM

## 2018-12-10 DIAGNOSIS — I73.00 RAYNAUD'S DISEASE WITHOUT GANGRENE: ICD-10-CM

## 2018-12-10 DIAGNOSIS — M34.1 CREST SYNDROME (HCC): Chronic | ICD-10-CM

## 2018-12-10 DIAGNOSIS — M34.1 CREST SYNDROME (HCC): ICD-10-CM

## 2018-12-10 DIAGNOSIS — E78.00 PURE HYPERCHOLESTEROLEMIA: Chronic | ICD-10-CM

## 2018-12-10 DIAGNOSIS — I27.21 PAH (PULMONARY ARTERY HYPERTENSION) (HCC): Primary | Chronic | ICD-10-CM

## 2018-12-10 DIAGNOSIS — E78.00 PURE HYPERCHOLESTEROLEMIA: ICD-10-CM

## 2018-12-10 DIAGNOSIS — I10 ESSENTIAL HYPERTENSION: Chronic | ICD-10-CM

## 2018-12-10 LAB
LEFT VENTRICULAR EJECTION FRACTION HIGH VALUE: 30 %
LEFT VENTRICULAR EJECTION FRACTION HIGH VALUE: 60 %
LEFT VENTRICULAR EJECTION FRACTION MODE: NORMAL
LV EF: 60 %
LVEF MODALITY: NORMAL

## 2018-12-10 PROCEDURE — 99214 OFFICE O/P EST MOD 30 MIN: CPT | Performed by: INTERNAL MEDICINE

## 2018-12-10 PROCEDURE — 93306 TTE W/DOPPLER COMPLETE: CPT

## 2018-12-10 RX ORDER — HYDROCHLOROTHIAZIDE 25 MG/1
25 TABLET ORAL DAILY
COMMUNITY
End: 2019-02-05 | Stop reason: SDUPTHER

## 2018-12-10 NOTE — PROGRESS NOTES
carbonate 600 MG TABS tablet, Take 1 tablet by mouth daily. , Disp: , Rfl:     Ginkgo Biloba 40 MG TABS, Take 40 mg by mouth daily. , Disp: , Rfl:     Multiple Vitamin (MULTIVITAMIN PO), Take  by mouth daily. , Disp: , Rfl:     Pyridoxine HCl (VITAMIN B-6 PO), Take  by mouth daily. , Disp: , Rfl:     fish oil-omega-3 fatty acids 1000 MG capsule, Take 1 g by mouth daily , Disp: , Rfl:     vitamin B-12 (CYANOCOBALAMIN) 100 MCG tablet, Take 100 mcg by mouth daily. , Disp: , Rfl:     Flaxseed, Linseed, (FLAX SEED OIL) 1000 MG CAPS, Take 1,000 mg by mouth daily. , Disp: , Rfl:     Ascorbic Acid (VITAMIN C) 500 MG tablet, Take 500 mg by mouth daily.   , Disp: , Rfl:     aspirin 81 MG EC tablet, Take 81 mg by mouth daily , Disp: , Rfl:     furosemide (LASIX) 20 MG tablet, Take 1 tablet by mouth daily as needed (swelling), Disp: 90 tablet, Rfl: 0      Immunization History   Administered Date(s) Administered    Influenza Virus Vaccine 10/15/2014, 11/30/2015    Influenza, High Dose (Fluzone 65 yrs and older) 12/12/2016    Pneumococcal 13-valent Conjugate ([id number]) 08/13/2015    Pneumococcal Polysaccharide ([id number]) 04/26/2013, 12/12/2016    Tdap (Boostrix, Adacel) 04/26/2013    Zoster Live (Zostavax) 06/11/2013       Patient Active Problem List   Diagnosis    Pure hypercholesterolemia    Raynauds syndrome    GERD (gastroesophageal reflux disease)    Hypertension    CREST syndrome ([address]    PAH (pulmonary artery hypertension) ([address]    Vitamin D deficiency    Osteopenia    Sigmoid diverticulosis    Adenomatous polyp       Past Medical History:   Diagnosis Date    Adenomatous polyp 7/17/2015    negative for high grade dysplasiaManegold    CREST syndrome ([address]     Hypertension     Osteopenia 7/22/10    LS -1.15    Pure hypercholesterolemia     Raynauds syndrome     [place]    Sigmoid diverticulosis 7/17/2015    [place]    Vitamin D deficiency      Past Surgical History:   Procedure

## 2019-01-02 ENCOUNTER — HOSPITAL ENCOUNTER (OUTPATIENT)
Age: 69
Discharge: HOME | End: 2019-01-02
Payer: COMMERCIAL

## 2019-01-02 DIAGNOSIS — I27.21 PAH (PULMONARY ARTERY HYPERTENSION) (HCC): Chronic | ICD-10-CM

## 2019-01-02 DIAGNOSIS — R06.02 SOB (SHORTNESS OF BREATH): ICD-10-CM

## 2019-01-02 DIAGNOSIS — M34.1 CREST SYNDROME (HCC): Chronic | ICD-10-CM

## 2019-01-02 DIAGNOSIS — I10 ESSENTIAL HYPERTENSION: Chronic | ICD-10-CM

## 2019-01-02 DIAGNOSIS — E78.00 PURE HYPERCHOLESTEROLEMIA: Chronic | ICD-10-CM

## 2019-01-02 DIAGNOSIS — E55.9 VITAMIN D INSUFFICIENCY: ICD-10-CM

## 2019-01-02 LAB
A/G RATIO: 1.8 (ref 1.1–2.2)
ALBUMIN SERPL-MCNC: 4.5 G/DL (ref 3.4–5)
ALP BLD-CCNC: 75 U/L (ref 40–129)
ALT SERPL-CCNC: 17 U/L (ref 10–40)
ANION GAP SERPL CALCULATED.3IONS-SCNC: 12 MMOL/L (ref 3–16)
AST SERPL-CCNC: 18 U/L (ref 15–37)
BASOPHILS ABSOLUTE: 0 K/UL (ref 0–0.2)
BASOPHILS RELATIVE PERCENT: 0.5 %
BILIRUB SERPL-MCNC: 0.4 MG/DL (ref 0–1)
BUN BLDV-MCNC: 12 MG/DL (ref 7–20)
CALCIUM SERPL-MCNC: 9.6 MG/DL (ref 8.3–10.6)
CHLORIDE BLD-SCNC: 100 MMOL/L (ref 99–110)
CHOLESTEROL, TOTAL: 150 MG/DL (ref 0–199)
CO2: 28 MMOL/L (ref 21–32)
CREAT SERPL-MCNC: 0.7 MG/DL (ref 0.6–1.2)
EOSINOPHILS ABSOLUTE: 0.2 K/UL (ref 0–0.6)
EOSINOPHILS RELATIVE PERCENT: 3.5 %
GFR AFRICAN AMERICAN: >60
GFR NON-AFRICAN AMERICAN: >60
GLOBULIN: 2.5 G/DL
GLUCOSE BLD-MCNC: 95 MG/DL (ref 70–99)
HCT VFR BLD CALC: 38.4 % (ref 36–48)
HDLC SERPL-MCNC: 54 MG/DL (ref 40–60)
HEMOGLOBIN: 12.6 G/DL (ref 12–16)
LDL CHOLESTEROL CALCULATED: 73 MG/DL
LYMPHOCYTES ABSOLUTE: 1 K/UL (ref 1–5.1)
LYMPHOCYTES RELATIVE PERCENT: 19.8 %
MCH RBC QN AUTO: 28.9 PG (ref 26–34)
MCHC RBC AUTO-ENTMCNC: 32.9 G/DL (ref 31–36)
MCV RBC AUTO: 87.8 FL (ref 80–100)
MONOCYTES ABSOLUTE: 0.4 K/UL (ref 0–1.3)
MONOCYTES RELATIVE PERCENT: 9.1 %
NEUTROPHILS ABSOLUTE: 3.3 K/UL (ref 1.7–7.7)
NEUTROPHILS RELATIVE PERCENT: 67.1 %
PDW BLD-RTO: 14.2 % (ref 12.4–15.4)
PLATELET # BLD: 181 K/UL (ref 135–450)
PMV BLD AUTO: 9.9 FL (ref 5–10.5)
POTASSIUM SERPL-SCNC: 4.4 MMOL/L (ref 3.5–5.1)
PRO-BNP: 164 PG/ML (ref 0–124)
RBC # BLD: 4.37 M/UL (ref 4–5.2)
SODIUM BLD-SCNC: 140 MMOL/L (ref 136–145)
TOTAL PROTEIN: 7 G/DL (ref 6.4–8.2)
TRIGL SERPL-MCNC: 116 MG/DL (ref 0–150)
VITAMIN D 25-HYDROXY: 31.8 NG/ML
VLDLC SERPL CALC-MCNC: 23 MG/DL
WBC # BLD: 4.9 K/UL (ref 4–11)

## 2019-01-02 PROCEDURE — 82306 VITAMIN D 25 HYDROXY: CPT

## 2019-01-02 PROCEDURE — 80053 COMPREHEN METABOLIC PANEL: CPT

## 2019-01-02 PROCEDURE — 80061 LIPID PANEL: CPT

## 2019-01-02 PROCEDURE — 85025 COMPLETE CBC W/AUTO DIFF WBC: CPT

## 2019-01-02 PROCEDURE — 36415 COLL VENOUS BLD VENIPUNCTURE: CPT

## 2019-01-02 PROCEDURE — 83880 ASSAY OF NATRIURETIC PEPTIDE: CPT

## 2019-01-14 RX ORDER — IRBESARTAN 150 MG/1
TABLET ORAL
Qty: 90 TABLET | Refills: 3 | Status: SHIPPED | OUTPATIENT
Start: 2019-01-14 | End: 2019-01-29 | Stop reason: ALTCHOICE

## 2019-01-14 RX ORDER — HYDROCHLOROTHIAZIDE 25 MG/1
TABLET ORAL
Qty: 90 TABLET | Refills: 3 | Status: SHIPPED | OUTPATIENT
Start: 2019-01-14 | End: 2019-02-05 | Stop reason: CLARIF

## 2019-01-29 RX ORDER — VALSARTAN 160 MG/1
160 TABLET ORAL DAILY
Qty: 90 TABLET | Refills: 3 | Status: SHIPPED | OUTPATIENT
Start: 2019-01-29 | End: 2019-02-05 | Stop reason: CLARIF

## 2019-01-29 RX ORDER — VALSARTAN 160 MG/1
160 TABLET ORAL DAILY
COMMUNITY
End: 2019-01-29 | Stop reason: SDUPTHER

## 2019-02-04 ENCOUNTER — TELEPHONE (OUTPATIENT)
Dept: CARDIOLOGY CLINIC | Age: 69
End: 2019-02-04

## 2019-02-05 RX ORDER — VALSARTAN AND HYDROCHLOROTHIAZIDE 160; 25 MG/1; MG/1
1 TABLET ORAL DAILY
COMMUNITY
End: 2019-04-18 | Stop reason: SDUPTHER

## 2019-03-05 ENCOUNTER — OFFICE VISIT (OUTPATIENT)
Dept: RHEUMATOLOGY | Age: 69
End: 2019-03-05
Payer: COMMERCIAL

## 2019-03-05 VITALS
HEIGHT: 64 IN | BODY MASS INDEX: 31.67 KG/M2 | DIASTOLIC BLOOD PRESSURE: 80 MMHG | WEIGHT: 185.5 LBS | HEART RATE: 72 BPM | SYSTOLIC BLOOD PRESSURE: 124 MMHG

## 2019-03-05 DIAGNOSIS — M34.1 CREST SYNDROME (HCC): Primary | ICD-10-CM

## 2019-03-05 PROCEDURE — 99213 OFFICE O/P EST LOW 20 MIN: CPT | Performed by: INTERNAL MEDICINE

## 2019-03-06 ENCOUNTER — HOSPITAL ENCOUNTER (OUTPATIENT)
Dept: WOMENS IMAGING | Age: 69
Discharge: HOME | End: 2019-03-06
Payer: COMMERCIAL

## 2019-03-06 DIAGNOSIS — Z12.39 BREAST CANCER SCREENING: ICD-10-CM

## 2019-03-06 PROCEDURE — 77063 BREAST TOMOSYNTHESIS BI: CPT

## 2019-04-18 RX ORDER — VALSARTAN AND HYDROCHLOROTHIAZIDE 160; 25 MG/1; MG/1
1 TABLET ORAL DAILY
Qty: 90 TABLET | Refills: 3 | Status: SHIPPED | OUTPATIENT
Start: 2019-04-18 | End: 2020-07-10

## 2019-04-18 NOTE — TELEPHONE ENCOUNTER
Pt request to resume valsartan hctz 160-25mg.   escribed to [facility]

## 2019-06-11 ENCOUNTER — OFFICE VISIT (OUTPATIENT)
Dept: FAMILY MEDICINE CLINIC | Age: 69
End: 2019-06-11
Payer: COMMERCIAL

## 2019-06-11 VITALS
DIASTOLIC BLOOD PRESSURE: 70 MMHG | BODY MASS INDEX: 32.17 KG/M2 | HEIGHT: 64 IN | WEIGHT: 188.4 LBS | SYSTOLIC BLOOD PRESSURE: 118 MMHG | RESPIRATION RATE: 16 BRPM | TEMPERATURE: 97.7 F | HEART RATE: 80 BPM | OXYGEN SATURATION: 100 %

## 2019-06-11 DIAGNOSIS — Z01.818 PRE-OP EVALUATION: Primary | ICD-10-CM

## 2019-06-11 DIAGNOSIS — H35.342 MACULAR HOLE, LEFT EYE: ICD-10-CM

## 2019-06-11 PROCEDURE — 99214 OFFICE O/P EST MOD 30 MIN: CPT | Performed by: REGISTERED NURSE

## 2019-06-11 ASSESSMENT — ENCOUNTER SYMPTOMS
DIARRHEA: 0
NAUSEA: 0
COUGH: 0
ABDOMINAL PAIN: 0
WHEEZING: 0
SHORTNESS OF BREATH: 0
CONSTIPATION: 0
CHEST TIGHTNESS: 0
VOMITING: 0

## 2019-06-11 ASSESSMENT — PATIENT HEALTH QUESTIONNAIRE - PHQ9
SUM OF ALL RESPONSES TO PHQ QUESTIONS 1-9: 0
2. FEELING DOWN, DEPRESSED OR HOPELESS: 0
1. LITTLE INTEREST OR PLEASURE IN DOING THINGS: 0
SUM OF ALL RESPONSES TO PHQ QUESTIONS 1-9: 0
SUM OF ALL RESPONSES TO PHQ9 QUESTIONS 1 & 2: 0

## 2019-06-11 NOTE — PROGRESS NOTES
[facility]  Clinic Note    Date: 6/11/2019                                               Subjective/Objective:     Chief Complaint   Patient presents with    Pre-op Exam     PT HAVING SURGERY ON 6/19/19 WITH DR Kasia Decker AT [facility]. LEFT EYE. HPI Patient presents with for pre-op evaluation for left eye macular hole surgery with Dr. Manual Mortimer at [facility], [place], operated by [facility]. Patient has macular hole in left eye- need repair. Patient denies new or worsening difficulties  with chest pains, dyspnea, syncope, or easy bleeding or bruising. There is no history of personal or family difficulties with anesthesia or surgical  bleeding. No personal history of difficulties with any surgery. Able to walk 3 city blocks without getting SOB.          Patient Active Problem List    Diagnosis Date Noted    Hypertension      Priority: High    Pure hypercholesterolemia      Priority: High    CREST syndrome ([facility] 07/26/2012     Priority: Medium    PAH (pulmonary artery hypertension) ([facility] 07/26/2012     Priority: Medium    Raynauds syndrome      Priority: Medium    Adenomatous polyp 07/17/2015     Priority: Low    GERD (gastroesophageal reflux disease)      Priority: Low    Sigmoid diverticulosis 07/17/2015    Vitamin D deficiency     Osteopenia 07/22/2010       Past Medical History:   Diagnosis Date    Adenomatous polyp 7/17/2015    negative for high grade dysplasiaManegold    CREST syndrome ([facility]     Hypertension     Osteopenia 7/22/10    LS -1.15    Pure hypercholesterolemia     Raynauds syndrome     [place]    Sigmoid diverticulosis 7/17/2015    [place]    Vitamin D deficiency        Past Surgical History:   Procedure Laterality Date    BREAST BIOPSY  2008    right breast    CARDIAC CATHETERIZATION  11/27/12    [address] Outpatient Visit on 01/02/2019   Component Date Value Ref Range Status    Pro-BNP 01/02/2019 164* 0 - 124 pg/mL Final    Comment: Methodology by NT-proBNP    An age-independent cutoff point of 300 pg/ml has a 98%  negative predictive value excluding acute heart failure. Values exceeding the age-related cutoff values (450 pg/mL if  age<50, 900 if 50-75 and 1800 if >75) has 90% sensitivity and  84% specificity for diagnosing acute HF. In patients with  renal compromise (eGFR<60) values greater than 1200pg/ml have  a diagnostic sensitivity and specificity of 89% and 72% for  acute HF.       WBC 01/02/2019 4.9  4.0 - 11.0 K/uL Final    RBC 01/02/2019 4.37  4.00 - 5.20 M/uL Final    Hemoglobin 01/02/2019 12.6  12.0 - 16.0 g/dL Final    Hematocrit 01/02/2019 38.4  36.0 - 48.0 % Final    MCV 01/02/2019 87.8  80.0 - 100.0 fL Final    MCH 01/02/2019 28.9  26.0 - 34.0 pg Final    MCHC 01/02/2019 32.9  31.0 - 36.0 g/dL Final    RDW 01/02/2019 14.2  12.4 - 15.4 % Final    Platelets 18/02/6414 181  135 - 450 K/uL Final    MPV 01/02/2019 9.9  5.0 - 10.5 fL Final    Neutrophils % 01/02/2019 67.1  % Final    Lymphocytes % 01/02/2019 19.8  % Final    Monocytes % 01/02/2019 9.1  % Final    Eosinophils % 01/02/2019 3.5  % Final    Basophils % 01/02/2019 0.5  % Final    Neutrophils # 01/02/2019 3.3  1.7 - 7.7 K/uL Final    Lymphocytes # 01/02/2019 1.0  1.0 - 5.1 K/uL Final    Monocytes # 01/02/2019 0.4  0.0 - 1.3 K/uL Final    Eosinophils # 01/02/2019 0.2  0.0 - 0.6 K/uL Final    Basophils # 01/02/2019 0.0  0.0 - 0.2 K/uL Final    Sodium 01/02/2019 140  136 - 145 mmol/L Final    Potassium 01/02/2019 4.4  3.5 - 5.1 mmol/L Final    Chloride 01/02/2019 100  99 - 110 mmol/L Final    CO2 01/02/2019 28  21 - 32 mmol/L Final    Anion Gap 01/02/2019 12  3 - 16 Final    Glucose 01/02/2019 95  70 - 99 mg/dL Final    BUN 01/02/2019 12  7 - 20 mg/dL Final    CREATININE 01/02/2019 0.7  0.6 - 1.2 mg/dL Final    GFR Non- 01/02/2019 >60  >60 Final    Comment: >60 mL/min/1.73m2 EGFR, calc. for ages 25 and older using the  MDRD formula (not corrected for weight), is valid for stable  renal function.  GFR  01/02/2019 >60  >60 Final    Comment: Chronic Kidney Disease: less than 60 ml/min/1.73 sq.m. Kidney Failure: less than 15 ml/min/1.73 sq.m. Results valid for patients 18 years and older.  Calcium 01/02/2019 9.6  8.3 - 10.6 mg/dL Final    Total Protein 01/02/2019 7.0  6.4 - 8.2 g/dL Final    Alb 01/02/2019 4.5  3.4 - 5.0 g/dL Final    Albumin/Globulin Ratio 01/02/2019 1.8  1.1 - 2.2 Final    Total Bilirubin 01/02/2019 0.4  0.0 - 1.0 mg/dL Final    Alkaline Phosphatase 01/02/2019 75  40 - 129 U/L Final    ALT 01/02/2019 17  10 - 40 U/L Final    AST 01/02/2019 18  15 - 37 U/L Final    Globulin 01/02/2019 2.5  g/dL Final    Cholesterol, Total 01/02/2019 150  0 - 199 mg/dL Final    Triglycerides 01/02/2019 116  0 - 150 mg/dL Final    HDL 01/02/2019 54  40 - 60 mg/dL Final    LDL Calculated 01/02/2019 73  <100 mg/dL Final    VLDL Cholesterol Calculated 01/02/2019 23  Not Established mg/dL Final    Vit D, 25-Hydroxy 01/02/2019 31.8  >=30 ng/mL Final    Comment: <=20 ng/mL. ........... Funmilayo Chars Deficient  21-29 ng/mL. ......... Funmilayo Chars Insufficient  >=30 ng/mL. ........ Funmilayo Chars Sufficient         Family History   Problem Relation Age of Onset    Heart Disease Mother 80        coronary stents    Cancer Father 46        bladder cancer, he was a smoker    High Cholesterol Sister        Current Outpatient Medications   Medication Sig Dispense Refill    valsartan-hydrochlorothiazide (DIOVAN-HCT) 160-25 MG per tablet Take 1 tablet by mouth daily 90 tablet 3    amLODIPine (NORVASC) 5 MG tablet TAKE 1 TABLET DAILY 90 tablet 3    atorvastatin (LIPITOR) 10 MG tablet TAKE 1 TABLET DAILY 90 tablet 3    furosemide (LASIX) 20 MG tablet Take 1 tablet by mouth daily as needed (swelling) 90 tablet 0    vitamin E 400 UNIT capsule Take 400 Units by mouth daily      Cholecalciferol (VITAMIN D3) 1000 UNITS CAPS   Take by mouth daily  30 capsule     Garlic 8269 MG TABS Take  by mouth daily.  Coenzyme Q10 (CO Q-10) 100 MG CAPS Take  by mouth daily.  Cinnamon 500 MG TABS Take  by mouth daily.  calcium carbonate 600 MG TABS tablet Take 1 tablet by mouth daily.  Ginkgo Biloba 40 MG TABS Take 40 mg by mouth daily.  Multiple Vitamin (MULTIVITAMIN PO) Take  by mouth daily.  Pyridoxine HCl (VITAMIN B-6 PO) Take  by mouth daily.  fish oil-omega-3 fatty acids 1000 MG capsule Take 1 g by mouth daily       vitamin B-12 (CYANOCOBALAMIN) 100 MCG tablet Take 100 mcg by mouth daily.  Flaxseed, Linseed, (FLAX SEED OIL) 1000 MG CAPS Take 1,000 mg by mouth daily.  Ascorbic Acid (VITAMIN C) 500 MG tablet Take 500 mg by mouth daily.  aspirin 81 MG EC tablet Take 81 mg by mouth daily        No current facility-administered medications for this visit. No Known Allergies    Review of Systems   Constitutional: Negative for chills, diaphoresis, fatigue and fever. Eyes: Positive for visual disturbance (left eye). Respiratory: Negative for cough, chest tightness, shortness of breath and wheezing. Cardiovascular: Negative for chest pain, palpitations and leg swelling. Gastrointestinal: Negative for abdominal pain, constipation, diarrhea, nausea and vomiting. Neurological: Negative for dizziness, weakness, light-headedness, numbness and headaches. All other systems reviewed and are negative. Vitals:  /70 (Site: Left Upper Arm, Position: Sitting, Cuff Size: Medium Adult)   Pulse 80   Temp 97.7 °F (36.5 °C) (Oral)   Ht 5' 4\" (1.626 m)   Wt 188 lb 6.4 oz (85.5 kg) Comment: SHOES ON  LMP  (Exact Date)   SpO2 100%   Breastfeeding? No   BMI 32.34 kg/m²     Physical Exam   Constitutional: She is oriented to person, place, and time. She appears well-developed and well-nourished. HENT:   Head: Normocephalic and atraumatic.    Right Ear: Tympanic membrane, external ear and ear canal normal.   Left Ear: Tympanic membrane, external ear and ear canal normal.   Nose: Nose normal.   Mouth/Throat: Uvula is midline, oropharynx is clear and moist and mucous membranes are normal.   Gag reflex intact   Eyes: Pupils are equal, round, and reactive to light. Conjunctivae and EOM are normal.   Neck: Normal range of motion. Neck supple. Carotid bruit is not present. No thyroid mass and no thyromegaly present. Cardiovascular: Normal rate, regular rhythm, normal heart sounds and intact distal pulses. Pulmonary/Chest: Effort normal and breath sounds normal.   Abdominal: Soft. Bowel sounds are normal.   Lymphadenopathy:     She has no cervical adenopathy. Neurological: She is alert and oriented to person, place, and time. Skin: Skin is warm and dry. Capillary refill takes less than 2 seconds. Psychiatric: She has a normal mood and affect. Her behavior is normal. Judgment and thought content normal.   Nursing note and vitals reviewed. Assessment/Plan     1. Pre-op evaluation  Patient is at moderate risk for low risk procedure. Patient is cleared for surgery. Patient educated to hold all ASA, blood thinners, and NSAID's 1 week prior to surgery. 2. Macular hole, left eye  Patient here for preop evaluation. No orders of the defined types were placed in this encounter. Return if symptoms worsen or fail to improve.     Gloria Schultz NP    6/11/2019  9:06 AM

## 2019-07-26 RX ORDER — ATORVASTATIN CALCIUM 10 MG/1
TABLET, FILM COATED ORAL
Qty: 90 TABLET | Refills: 3 | Status: CANCELLED | OUTPATIENT
Start: 2019-07-26

## 2019-07-30 DIAGNOSIS — R06.02 SOB (SHORTNESS OF BREATH): ICD-10-CM

## 2019-07-30 DIAGNOSIS — I10 ESSENTIAL HYPERTENSION: ICD-10-CM

## 2019-07-30 DIAGNOSIS — I27.21 PAH (PULMONARY ARTERY HYPERTENSION) (HCC): ICD-10-CM

## 2019-07-30 DIAGNOSIS — I73.00 RAYNAUD'S DISEASE WITHOUT GANGRENE: ICD-10-CM

## 2019-07-30 DIAGNOSIS — M34.1 CREST SYNDROME (HCC): ICD-10-CM

## 2019-07-30 DIAGNOSIS — E78.00 PURE HYPERCHOLESTEROLEMIA: ICD-10-CM

## 2019-07-30 RX ORDER — ATORVASTATIN CALCIUM 10 MG/1
TABLET, FILM COATED ORAL
Qty: 90 TABLET | Refills: 3 | Status: SHIPPED | OUTPATIENT
Start: 2019-07-30 | End: 2020-07-10

## 2019-07-30 NOTE — TELEPHONE ENCOUNTER
Medication Refill    Last appointment with cardiology? 12-10-18     Next appointment with Cardiology?  12-2-19    Medication needing refilled: atorvastatin (LIPITOR) 10 MG tablet    Doseage of the medication: 10 mg    How are you taking this medication (QD, BID, TID, QID, PRN): 1 tablet daily    Patient want a 30 or 90 day supply called in: 80    Which Pharmacy are we sending the medication to    Erik Young [phone]

## 2019-11-05 ENCOUNTER — OFFICE VISIT (OUTPATIENT)
Dept: RHEUMATOLOGY | Age: 69
End: 2019-11-05
Payer: COMMERCIAL

## 2019-11-05 VITALS
DIASTOLIC BLOOD PRESSURE: 80 MMHG | HEART RATE: 72 BPM | BODY MASS INDEX: 32.44 KG/M2 | HEIGHT: 64 IN | WEIGHT: 190 LBS | SYSTOLIC BLOOD PRESSURE: 118 MMHG

## 2019-11-05 DIAGNOSIS — M34.1 CREST SYNDROME (HCC): Primary | ICD-10-CM

## 2019-11-05 LAB
CREAT SERPL-MCNC: 0.6 MG/DL (ref 0.6–1.2)
GFR AFRICAN AMERICAN: >60
GFR NON-AFRICAN AMERICAN: >60

## 2019-11-05 PROCEDURE — 99213 OFFICE O/P EST LOW 20 MIN: CPT | Performed by: INTERNAL MEDICINE

## 2019-11-19 ENCOUNTER — TELEPHONE (OUTPATIENT)
Dept: CARDIOLOGY CLINIC | Age: 69
End: 2019-11-19

## 2019-11-19 DIAGNOSIS — E78.00 PURE HYPERCHOLESTEROLEMIA: ICD-10-CM

## 2019-11-19 DIAGNOSIS — I73.00 RAYNAUD'S DISEASE WITHOUT GANGRENE: ICD-10-CM

## 2019-11-19 DIAGNOSIS — I27.21 PAH (PULMONARY ARTERY HYPERTENSION) (HCC): ICD-10-CM

## 2019-11-19 DIAGNOSIS — I10 ESSENTIAL HYPERTENSION: ICD-10-CM

## 2019-11-19 DIAGNOSIS — R06.02 SOB (SHORTNESS OF BREATH): ICD-10-CM

## 2019-11-19 DIAGNOSIS — M34.1 CREST SYNDROME (HCC): ICD-10-CM

## 2019-11-19 RX ORDER — AMLODIPINE BESYLATE 5 MG/1
5 TABLET ORAL DAILY
Qty: 90 TABLET | Refills: 3 | Status: SHIPPED | OUTPATIENT
Start: 2019-11-19 | End: 2021-02-15

## 2019-12-02 ENCOUNTER — HOSPITAL ENCOUNTER (OUTPATIENT)
Dept: NON INVASIVE DIAGNOSTICS | Age: 69
Discharge: HOME | End: 2019-12-02
Payer: COMMERCIAL

## 2019-12-02 ENCOUNTER — OFFICE VISIT (OUTPATIENT)
Dept: CARDIOLOGY CLINIC | Age: 69
End: 2019-12-02
Payer: COMMERCIAL

## 2019-12-02 VITALS
SYSTOLIC BLOOD PRESSURE: 136 MMHG | HEIGHT: 64 IN | DIASTOLIC BLOOD PRESSURE: 74 MMHG | HEART RATE: 78 BPM | WEIGHT: 188 LBS | BODY MASS INDEX: 32.1 KG/M2 | RESPIRATION RATE: 16 BRPM

## 2019-12-02 DIAGNOSIS — M34.1 CREST SYNDROME (HCC): ICD-10-CM

## 2019-12-02 DIAGNOSIS — E55.9 VITAMIN D INSUFFICIENCY: ICD-10-CM

## 2019-12-02 DIAGNOSIS — E78.00 PURE HYPERCHOLESTEROLEMIA: ICD-10-CM

## 2019-12-02 DIAGNOSIS — R06.02 SOB (SHORTNESS OF BREATH): ICD-10-CM

## 2019-12-02 DIAGNOSIS — E78.00 PURE HYPERCHOLESTEROLEMIA: Chronic | ICD-10-CM

## 2019-12-02 DIAGNOSIS — I10 ESSENTIAL HYPERTENSION: ICD-10-CM

## 2019-12-02 DIAGNOSIS — I27.21 PAH (PULMONARY ARTERY HYPERTENSION) (HCC): Primary | ICD-10-CM

## 2019-12-02 DIAGNOSIS — I27.21 PAH (PULMONARY ARTERY HYPERTENSION) (HCC): Chronic | ICD-10-CM

## 2019-12-02 DIAGNOSIS — M34.1 CREST SYNDROME (HCC): Chronic | ICD-10-CM

## 2019-12-02 DIAGNOSIS — I10 ESSENTIAL HYPERTENSION: Chronic | ICD-10-CM

## 2019-12-02 LAB
LV EF: 60 %
LVEF MODALITY: NORMAL

## 2019-12-02 PROCEDURE — 93306 TTE W/DOPPLER COMPLETE: CPT

## 2019-12-02 PROCEDURE — 99214 OFFICE O/P EST MOD 30 MIN: CPT | Performed by: INTERNAL MEDICINE

## 2019-12-05 ENCOUNTER — OFFICE VISIT (OUTPATIENT)
Dept: FAMILY MEDICINE CLINIC | Age: 69
End: 2019-12-05
Payer: COMMERCIAL

## 2019-12-05 VITALS
RESPIRATION RATE: 16 BRPM | WEIGHT: 188 LBS | TEMPERATURE: 98.1 F | BODY MASS INDEX: 32.1 KG/M2 | DIASTOLIC BLOOD PRESSURE: 82 MMHG | HEART RATE: 73 BPM | SYSTOLIC BLOOD PRESSURE: 126 MMHG | HEIGHT: 64 IN | OXYGEN SATURATION: 97 %

## 2019-12-05 DIAGNOSIS — I10 ESSENTIAL HYPERTENSION: ICD-10-CM

## 2019-12-05 DIAGNOSIS — Z72.89 OTHER PROBLEMS RELATED TO LIFESTYLE: ICD-10-CM

## 2019-12-05 DIAGNOSIS — R06.02 SOB (SHORTNESS OF BREATH): ICD-10-CM

## 2019-12-05 DIAGNOSIS — Z00.00 ROUTINE GENERAL MEDICAL EXAMINATION AT A HEALTH CARE FACILITY: Primary | ICD-10-CM

## 2019-12-05 DIAGNOSIS — Z13.6 SCREENING FOR CARDIOVASCULAR CONDITION: ICD-10-CM

## 2019-12-05 DIAGNOSIS — E55.9 VITAMIN D DEFICIENCY: ICD-10-CM

## 2019-12-05 DIAGNOSIS — E78.00 PURE HYPERCHOLESTEROLEMIA: Chronic | ICD-10-CM

## 2019-12-05 DIAGNOSIS — I27.21 PAH (PULMONARY ARTERY HYPERTENSION) (HCC): Chronic | ICD-10-CM

## 2019-12-05 DIAGNOSIS — Z71.89 ACP (ADVANCE CARE PLANNING): ICD-10-CM

## 2019-12-05 LAB
A/G RATIO: 2.1 (ref 1.1–2.2)
ALBUMIN SERPL-MCNC: 4.5 G/DL (ref 3.4–5)
ALP BLD-CCNC: 76 U/L (ref 40–129)
ALT SERPL-CCNC: 27 U/L (ref 10–40)
ANION GAP SERPL CALCULATED.3IONS-SCNC: 14 MMOL/L (ref 3–16)
AST SERPL-CCNC: 20 U/L (ref 15–37)
BILIRUB SERPL-MCNC: 0.4 MG/DL (ref 0–1)
BUN BLDV-MCNC: 14 MG/DL (ref 7–20)
CALCIUM SERPL-MCNC: 9.6 MG/DL (ref 8.3–10.6)
CHLORIDE BLD-SCNC: 101 MMOL/L (ref 99–110)
CHOLESTEROL, TOTAL: 146 MG/DL (ref 0–199)
CO2: 27 MMOL/L (ref 21–32)
CREAT SERPL-MCNC: 0.7 MG/DL (ref 0.6–1.2)
GFR AFRICAN AMERICAN: >60
GFR NON-AFRICAN AMERICAN: >60
GLOBULIN: 2.1 G/DL
GLUCOSE BLD-MCNC: 99 MG/DL (ref 70–99)
HCT VFR BLD CALC: 38.6 % (ref 36–48)
HDLC SERPL-MCNC: 67 MG/DL (ref 40–60)
HEMOGLOBIN: 12.8 G/DL (ref 12–16)
LDL CHOLESTEROL CALCULATED: 56 MG/DL
MCH RBC QN AUTO: 29.3 PG (ref 26–34)
MCHC RBC AUTO-ENTMCNC: 33.2 G/DL (ref 31–36)
MCV RBC AUTO: 88.1 FL (ref 80–100)
PDW BLD-RTO: 13.7 % (ref 12.4–15.4)
PLATELET # BLD: 185 K/UL (ref 135–450)
PMV BLD AUTO: 8.7 FL (ref 5–10.5)
POTASSIUM SERPL-SCNC: 3.9 MMOL/L (ref 3.5–5.1)
PRO-BNP: 103 PG/ML (ref 0–124)
RBC # BLD: 4.38 M/UL (ref 4–5.2)
SODIUM BLD-SCNC: 142 MMOL/L (ref 136–145)
TOTAL PROTEIN: 6.6 G/DL (ref 6.4–8.2)
TRIGL SERPL-MCNC: 117 MG/DL (ref 0–150)
VITAMIN D 25-HYDROXY: 28 NG/ML
VLDLC SERPL CALC-MCNC: 23 MG/DL
WBC # BLD: 5.5 K/UL (ref 4–11)

## 2019-12-05 PROCEDURE — 36415 COLL VENOUS BLD VENIPUNCTURE: CPT | Performed by: REGISTERED NURSE

## 2019-12-05 PROCEDURE — G0439 PPPS, SUBSEQ VISIT: HCPCS | Performed by: REGISTERED NURSE

## 2019-12-05 PROCEDURE — G0446 INTENS BEHAVE THER CARDIO DX: HCPCS | Performed by: REGISTERED NURSE

## 2019-12-05 PROCEDURE — G0447 BEHAVIOR COUNSEL OBESITY 15M: HCPCS | Performed by: REGISTERED NURSE

## 2019-12-05 RX ORDER — ASPIRIN 81 MG/1
81 TABLET ORAL DAILY
Qty: 90 TABLET | Refills: 3 | Status: SHIPPED | OUTPATIENT
Start: 2019-12-05

## 2019-12-05 ASSESSMENT — PATIENT HEALTH QUESTIONNAIRE - PHQ9
SUM OF ALL RESPONSES TO PHQ QUESTIONS 1-9: 0
SUM OF ALL RESPONSES TO PHQ QUESTIONS 1-9: 0

## 2019-12-05 ASSESSMENT — LIFESTYLE VARIABLES: HOW OFTEN DO YOU HAVE A DRINK CONTAINING ALCOHOL: 0

## 2019-12-30 ENCOUNTER — TELEPHONE (OUTPATIENT)
Dept: FAMILY MEDICINE CLINIC | Age: 69
End: 2019-12-30

## 2020-03-04 ENCOUNTER — TELEPHONE (OUTPATIENT)
Dept: FAMILY MEDICINE CLINIC | Age: 70
End: 2020-03-04

## 2020-06-01 NOTE — PROGRESS NOTES
[address]   Advanced Heart Failure/Pulmonary Hypertension  Cardiac Evaluation      Denise Hagen  YOB: 1950    Date of Visit:  6/3/20  Chief Complaint   Patient presents with    Shortness of Breath     PAH      History of Present Illness:   Dutch Dai is seen in follow up for [address] related to CREST syndrome. She was started on Letairis 5 mg in Dec after a [address] on [address]. She eas on Letaris but stopped due to swelling and does not want to take another medication in it's placeShe does suffer from Raynaud's and has cold fingers but no wounds. Sees Dr. Valerie infante regularly. She had an echo 2013, that was unchanged. Her RVSP was 42 mmHg, slightly less (at time of right heart cath, RVSP was 48 mmHg). An echo 2015 showed RVSP of 35 mg HG was on Letaris but it was stopped due to swelling and does not want to take another medication in it's place. She has been stable. She had PFT's done in March that were unchanged from previous ones. Today she is talking of the challenges of the pandemic. She is feeling well. Denies SOB but notices a lack of energy. She does get out for walks. She denies ankle swelling and not requiring lasix. Her prescription is  and will call if needs a renewal.  Her weight is down from December. She is having cataract surgery next week.        NYHA:  II  No Known Allergies    Current Outpatient Medications:     aspirin 81 MG EC tablet, Take 1 tablet by mouth daily, Disp: 90 tablet, Rfl: 3    amLODIPine (NORVASC) 5 MG tablet, Take 1 tablet by mouth daily, Disp: 90 tablet, Rfl: 3    atorvastatin (LIPITOR) 10 MG tablet, TAKE 1 TABLET DAILY, Disp: 90 tablet, Rfl: 3    valsartan-hydrochlorothiazide (DIOVAN-HCT) 160-25 MG per tablet, Take 1 tablet by mouth daily, Disp: 90 tablet, Rfl: 3    vitamin E 400 UNIT capsule, Take 400 Units by mouth daily, Disp: , Rfl:     Cholecalciferol (VITAMIN D3) 1000 UNITS CAPS,  Take by mouth daily , Disp: 30 capsule, Rfl:     Garlic 1265 MG TABS, Take  by mouth daily. , Disp: , Rfl:     Coenzyme Q10 (CO Q-10) 100 MG CAPS, Take  by mouth daily. , Disp: , Rfl:     Cinnamon 500 MG TABS, Take  by mouth daily. , Disp: , Rfl:     calcium carbonate 600 MG TABS tablet, Take 1 tablet by mouth daily. , Disp: , Rfl:     Ginkgo Biloba 40 MG TABS, Take 40 mg by mouth daily. , Disp: , Rfl:     Multiple Vitamin (MULTIVITAMIN PO), Take  by mouth daily. , Disp: , Rfl:     Pyridoxine HCl (VITAMIN B-6 PO), Take  by mouth daily. , Disp: , Rfl:     fish oil-omega-3 fatty acids 1000 MG capsule, Take 1 g by mouth daily , Disp: , Rfl:     vitamin B-12 (CYANOCOBALAMIN) 100 MCG tablet, Take 100 mcg by mouth daily. , Disp: , Rfl:     Flaxseed, Linseed, (FLAX SEED OIL) 1000 MG CAPS, Take 1,000 mg by mouth daily. , Disp: , Rfl:     Ascorbic Acid (VITAMIN C) 500 MG tablet, Take 500 mg by mouth daily.   , Disp: , Rfl:     furosemide (LASIX) 20 MG tablet, Take 1 tablet by mouth daily as needed (swelling) (Patient not taking: Reported on 6/3/2020), Disp: 90 tablet, Rfl: 0      Immunization History   Administered Date(s) Administered    Influenza 11/30/2015    Influenza Virus Vaccine 10/15/2014    Influenza Whole 09/23/2009    Influenza, High Dose (Fluzone 65 yrs and older) 11/30/2015, 12/12/2016, 10/30/2018, 11/05/2019    Pneumococcal Conjugate 13-valent ([id number]) 08/13/2015    Pneumococcal Polysaccharide ([id number]) 04/26/2013, 12/12/2016    Tdap (Boostrix, Adacel) 04/26/2013    Zoster Live (Zostavax) 06/11/2013       Patient Active Problem List   Diagnosis    Pure hypercholesterolemia    Raynauds syndrome    GERD (gastroesophageal reflux disease)    Hypertension    CREST syndrome ([address]    PAH (pulmonary artery hypertension) ([facility]    Vitamin D deficiency    Osteopenia    Sigmoid diverticulosis    Adenomatous polyp       Past Medical History:   Diagnosis Date    Adenomatous polyp 7/17/2015    negative for high grade Emotionally abused: Not on file     Physically abused: Not on file     Forced sexual activity: Not on file   Other Topics Concern    Not on file   Social History Narrative    Not on file     Review of Systems:   · Constitutional: there has been no unanticipated weight loss. There's been no change in energy level, sleep pattern, or activity level. · Eyes: No visual changes or diplopia. No scleral icterus. · ENT: No Headaches, hearing loss or vertigo. No mouth sores or sore throat. · Cardiovascular: Reviewed in HPI  · Respiratory: No cough or wheezing, no sputum production. No hematemesis. · Gastrointestinal: No abdominal pain, appetite loss, blood in stools. No change in bowel or bladder habits. · Genitourinary: No dysuria, trouble voiding, or hematuria. · Musculoskeletal:  No gait disturbance, weakness or joint complaints. · Integumentary: No rash or pruritis. · Neurological: No headache, diplopia, change in muscle strength, numbness or tingling. No change in gait, balance, coordination, mood, affect, memory, mentation, behavior. · Psychiatric: No anxiety, no depression. · Endocrine: No malaise, fatigue or temperature intolerance. No excessive thirst, fluid intake, or urination. No tremor. · Hematologic/Lymphatic: No abnormal bruising or bleeding, blood clots or swollen lymph nodes. · Allergic/Immunologic: No nasal congestion or hives. Physical Examination:    Vitals:    06/03/20 0925   BP: 120/78   Pulse: 79   SpO2: 99%   Weight: 183 lb (83 kg)   Height: 5' 4\" (1.626 m)   Stable.     Wt Readings from Last 3 Encounters:   06/03/20 183 lb (83 kg)   06/02/20 184 lb (83.5 kg)   12/05/19 188 lb (85.3 kg)     BP Readings from Last 3 Encounters:   06/03/20 120/78   06/02/20 129/70   12/05/19 126/82         Constitutional and General Appearance: Warm and dry, no apparent distress, normal coloration  HEENT:  Normocephalic, atraumatic  Respiratory:  · Normal excursion and expansion without use of accessory muscles  · Resp Auscultation: Normal breath sounds without dullness  Cardiovascular:  · The apical impulses not displaced  · Heart tones are crisp and normal  · JVP ~ 8 cm H2O.  · Regular rate and rhythm, normal P5J4, 2/6 systolic murmur at USB, no g/r/c  · Peripheral pulses are symmetrical and full  · There is no clubbing, cyanosis of the extremities.   · No edema in ankles  · Pedal Pulses: 2+ and equal   Abdomen:  · No masses or tenderness  · Liver/Spleen: No Abnormalities Noted  Neurological/Psychiatric:  · Alert and oriented in all spheres  · Moves all extremities well  · Exhibits normal gait balance and coordination  · No abnormalities of mood, affect, memory, mentation, or behavior are noted    Lab Data:  CBC:   Lab Results   Component Value Date    WBC 5.5 12/05/2019    WBC 4.9 01/02/2019    WBC 6.8 01/23/2018    RBC 4.38 12/05/2019    RBC 4.37 01/02/2019    RBC 4.35 01/23/2018    HGB 12.8 12/05/2019    HGB 12.6 01/02/2019    HGB 12.6 01/23/2018    HCT 38.6 12/05/2019    HCT 38.4 01/02/2019    HCT 37.9 01/23/2018    MCV 88.1 12/05/2019    MCV 87.8 01/02/2019    MCV 87.1 01/23/2018    RDW 13.7 12/05/2019    RDW 14.2 01/02/2019    RDW 13.7 01/23/2018     12/05/2019     01/02/2019     01/23/2018     BMP:   Lab Results   Component Value Date     12/05/2019     01/02/2019     01/23/2018    K 3.9 12/05/2019    K 4.4 01/02/2019    K 4.1 01/23/2018     12/05/2019     01/02/2019     01/23/2018    CO2 27 12/05/2019    CO2 28 01/02/2019    CO2 30 01/23/2018    PHOS 3.9 11/29/2012    BUN 14 12/05/2019    BUN 12 01/02/2019    BUN 13 01/23/2018    CREATININE 0.7 12/05/2019    CREATININE 0.6 11/05/2019    CREATININE 0.7 01/02/2019     BNP:   Lab Results   Component Value Date    BNP 40 03/03/2014    BNP 28 11/29/2012    BNP 52 08/10/2012     FASTING LIPID PANEL:  Lab Results   Component Value Date    CHOL 146 12/05/2019    HDL 67 12/05/2019    HDL 65 SpO2: 99%   Weight 183 pounds   3. CREST syndrome ([address]   Per Dr. Sravani Nichols.   - 11/26/12 [facility], recommended starting Endothelin antagonist for treatment associated with CREST syndrome. 4. Vitamin D deficiency  12/5/19 Vit D 28.0 recheck today       Plan:    Follow up in 6 months with echo  CBC, CMP, BNP, lipids, Vit D         QUALITY MEASURES  1. Tobacco Cessation Counseling: NA  2. Retake of BP if >140/90:   NA  3. Documentation to PCP/referring for new patient:  Sent to PCP at close of office visit  4. CAD patient on anti-platelet: NA  5. CAD patient on STATIN therapy:  NA  6. Patient with CHF and aFib on anticoagulation:  NA     I appreciate the opportunity of cooperating in the care of this individual.    Reema Dominguez MD.     Scribe Attestation: This note was scribed in the presence of Karin Marlow MD by Jose Julien RN    The scribe's documentation has been prepared under my direction and personally reviewed by me in its entirety. I confirm that the note above accurately reflects all work, treatment, procedures, and medical decision making performed by me.

## 2020-06-02 ENCOUNTER — TELEMEDICINE (OUTPATIENT)
Dept: FAMILY MEDICINE CLINIC | Age: 70
End: 2020-06-02
Payer: COMMERCIAL

## 2020-06-02 VITALS
SYSTOLIC BLOOD PRESSURE: 129 MMHG | WEIGHT: 184 LBS | DIASTOLIC BLOOD PRESSURE: 70 MMHG | HEIGHT: 64 IN | BODY MASS INDEX: 31.41 KG/M2 | HEART RATE: 82 BPM

## 2020-06-02 PROCEDURE — 99212 OFFICE O/P EST SF 10 MIN: CPT | Performed by: NURSE PRACTITIONER

## 2020-06-02 NOTE — PROGRESS NOTES
medications have been marked as taking for the 6/2/20 encounter (Appointment) with EM Kelley CNP. PT HAD EYE SURGERY LAST YEAR FOR A MACULAR HOLE- SHE WAS TOLD THAT THIS WOULD MAKE HER CATARACTS GROW REALLY FAST - LEFT EYE IS  WORSE THAN THE RIGHT - [address] [place] CLOSING IN ON HER LEFT EYE    This patient presents to the office today for a preoperative consultation at the request of surgeon, Dr. Ragini Vallejo, who plans on performing bilateral cataract removal  on [address] at [facility].   The current problem began 8 months ago,   Planned anesthesia: Topical anesthesia /MAC   Known anesthesia problems: None   Bleeding risk: No recent or remote history of abnormal bleeding  Personal or FH of DVT/PE: No    Patient objection to receiving blood products: No    Patient Active Problem List   Diagnosis    Pure hypercholesterolemia    Raynauds syndrome    GERD (gastroesophageal reflux disease)    Hypertension    CREST syndrome ([address]    PAH (pulmonary artery hypertension) ([address]    Vitamin D deficiency    Osteopenia    Sigmoid diverticulosis    Adenomatous polyp       Past Medical History:   Diagnosis Date    Adenomatous polyp 7/17/2015    negative for high grade dysplasiaManegold    CREST syndrome ([address]     Hypertension     Osteopenia 7/22/10    LS -1.15    Pure hypercholesterolemia     Raynauds syndrome     [place]    Sigmoid diverticulosis 7/17/2015    [place]    Vitamin D deficiency      Past Surgical History:   Procedure Laterality Date    BREAST BIOPSY  2008    right breast    CARDIAC CATHETERIZATION  11/27/12    Right Heart Cath    EYE SURGERY Left 08/2019     Family History   Problem Relation Age of Onset    Heart Disease Mother 80        coronary stents    Cancer Father 46        bladder cancer, he was a smoker    High Cholesterol Sister     No Known Problems Brother      Social History     Socioeconomic History    Marital status:      Spouse name: Not on file    Number of children: Not on file    Years of education: Not on file    Highest education level: Not on file   Occupational History    Not on file   Social Needs    Financial resource strain: Not on file    Food insecurity     Worry: Not on file     Inability: Not on file    Transportation needs     Medical: Not on file     Non-medical: Not on file   Tobacco Use    Smoking status: Never Smoker    Smokeless tobacco: Never Used    Tobacco comment: avoid tobacco   Substance and Sexual Activity    Alcohol use: Yes     Alcohol/week: 0.0 standard drinks     Comment: Has an occasional drink on holidays.  Drug use: No    Sexual activity: Yes     Partners: Male     Comment:    Lifestyle    Physical activity     Days per week: Not on file     Minutes per session: Not on file    Stress: Not on file   Relationships    Social connections     Talks on phone: Not on file     Gets together: Not on file     Attends Sabianist service: Not on file     Active member of club or organization: Not on file     Attends meetings of clubs or organizations: Not on file     Relationship status: Not on file    Intimate partner violence     Fear of current or ex partner: Not on file     Emotionally abused: Not on file     Physically abused: Not on file     Forced sexual activity: Not on file   Other Topics Concern    Not on file   Social History Narrative    Not on file       Review of Systems  A comprehensive review of systems was negative except for what was noted in the HPI. Physical Exam   Constitutional: She is oriented to person, place, and time. She appears well-developed and well-nourished. No distress. Psychiatric: She has a normal mood and affect. Her behavior is normal.     EKG Interpretation:  N/A.     Lab Review not applicable        Assessment:    Esteban Krueger was seen today for pre-op exam.    Diagnoses and all orders for this visit:    Cataract of both eyes, unspecified cataract

## 2020-06-03 ENCOUNTER — HOSPITAL ENCOUNTER (OUTPATIENT)
Age: 70
Discharge: HOME | End: 2020-06-03
Payer: COMMERCIAL

## 2020-06-03 ENCOUNTER — VIRTUAL VISIT (OUTPATIENT)
Dept: RHEUMATOLOGY | Age: 70
End: 2020-06-03
Payer: COMMERCIAL

## 2020-06-03 ENCOUNTER — OFFICE VISIT (OUTPATIENT)
Dept: CARDIOLOGY CLINIC | Age: 70
End: 2020-06-03
Payer: COMMERCIAL

## 2020-06-03 VITALS
OXYGEN SATURATION: 99 % | SYSTOLIC BLOOD PRESSURE: 120 MMHG | HEART RATE: 79 BPM | HEIGHT: 64 IN | WEIGHT: 183 LBS | BODY MASS INDEX: 31.24 KG/M2 | DIASTOLIC BLOOD PRESSURE: 78 MMHG

## 2020-06-03 LAB
A/G RATIO: 1.8 (ref 1.1–2.2)
ALBUMIN SERPL-MCNC: 4.4 G/DL (ref 3.4–5)
ALP BLD-CCNC: 70 U/L (ref 40–129)
ALT SERPL-CCNC: 17 U/L (ref 10–40)
ANION GAP SERPL CALCULATED.3IONS-SCNC: 13 MMOL/L (ref 3–16)
AST SERPL-CCNC: 20 U/L (ref 15–37)
BILIRUB SERPL-MCNC: 0.4 MG/DL (ref 0–1)
BUN BLDV-MCNC: 17 MG/DL (ref 7–20)
CALCIUM SERPL-MCNC: 9.7 MG/DL (ref 8.3–10.6)
CHLORIDE BLD-SCNC: 104 MMOL/L (ref 99–110)
CHOLESTEROL, TOTAL: 144 MG/DL (ref 0–199)
CO2: 26 MMOL/L (ref 21–32)
CREAT SERPL-MCNC: 0.8 MG/DL (ref 0.6–1.2)
GFR AFRICAN AMERICAN: >60
GFR NON-AFRICAN AMERICAN: >60
GLOBULIN: 2.5 G/DL
GLUCOSE BLD-MCNC: 97 MG/DL (ref 70–99)
HCT VFR BLD CALC: 40 % (ref 36–48)
HDLC SERPL-MCNC: 64 MG/DL (ref 40–60)
HEMOGLOBIN: 13.2 G/DL (ref 12–16)
LDL CHOLESTEROL CALCULATED: 63 MG/DL
MCH RBC QN AUTO: 29.3 PG (ref 26–34)
MCHC RBC AUTO-ENTMCNC: 33.1 G/DL (ref 31–36)
MCV RBC AUTO: 88.7 FL (ref 80–100)
PDW BLD-RTO: 13.7 % (ref 12.4–15.4)
PLATELET # BLD: 150 K/UL (ref 135–450)
PMV BLD AUTO: 9.7 FL (ref 5–10.5)
POTASSIUM SERPL-SCNC: 4.5 MMOL/L (ref 3.5–5.1)
PRO-BNP: 186 PG/ML (ref 0–124)
RBC # BLD: 4.51 M/UL (ref 4–5.2)
SODIUM BLD-SCNC: 143 MMOL/L (ref 136–145)
TOTAL PROTEIN: 6.9 G/DL (ref 6.4–8.2)
TRIGL SERPL-MCNC: 86 MG/DL (ref 0–150)
VITAMIN D 25-HYDROXY: 40.4 NG/ML
VLDLC SERPL CALC-MCNC: 17 MG/DL
WBC # BLD: 5.5 K/UL (ref 4–11)

## 2020-06-03 PROCEDURE — 80061 LIPID PANEL: CPT

## 2020-06-03 PROCEDURE — 82306 VITAMIN D 25 HYDROXY: CPT

## 2020-06-03 PROCEDURE — 36415 COLL VENOUS BLD VENIPUNCTURE: CPT

## 2020-06-03 PROCEDURE — 99214 OFFICE O/P EST MOD 30 MIN: CPT | Performed by: INTERNAL MEDICINE

## 2020-06-03 PROCEDURE — 99213 OFFICE O/P EST LOW 20 MIN: CPT | Performed by: INTERNAL MEDICINE

## 2020-06-03 PROCEDURE — 85027 COMPLETE CBC AUTOMATED: CPT

## 2020-06-03 PROCEDURE — 80053 COMPREHEN METABOLIC PANEL: CPT

## 2020-06-03 PROCEDURE — 83880 ASSAY OF NATRIURETIC PEPTIDE: CPT

## 2020-06-03 NOTE — PROGRESS NOTES
Mily Montoya is a 68 y.o. female being evaluated by a Virtual Visit (video visit) encounter to address concerns as mentioned above. A caregiver was present when appropriate. Due to this being a TeleHealth encounter (During [facility] public health emergency), evaluation of the following organ systems was limited: Vitals/Constitutional/EENT/Resp/CV/GI//MS/Neuro/Skin/Heme-Lymph-Imm. Pursuant to the emergency declaration under the [address] [place] [address] and the [employer] Act, this Virtual Visit was conducted with patient's (and/or legal guardian's) consent, to reduce the patient's risk of exposure to COVID-19 and provide necessary medical care. The patient (and/or legal guardian) has also been advised to contact this office for worsening conditions or problems, and seek emergency medical treatment and/or call 911 if deemed necessary. Patient identification was verified at the start of the visit: Yes    Total time spent for this encounter: Not billed by time    Services were provided through a video synchronous discussion virtually to substitute for in-person clinic visit. Patient and provider were located at their individual homes. --Manny Awad RN on 6/3/2020 at 2:49 PM    An electronic signature was used to authenticate this note.

## 2020-06-17 NOTE — PROGRESS NOTES
[address] [facility] time____0815________        Surgery time___0900_________    Take the following medications with a sip of water:    Do not eat or drink anything after 12:00 midnight prior to your surgery. except prep  This includes water chewing gum, mints and ice chips. You may brush your teeth and gargle the morning of your surgery, but do not swallow the water     You may be asked to stop blood thinners such as Coumadin, Plavix, Fragmin, Lovenox, etc., or any anti-inflammatories such as:  Aspirin, Ibuprofen, Advil, Naproxen prior to your surgery. We also ask that you stop any OTC medications such as fish oil, vitamin E, glucosamine, garlic, Multivitamins, COQ 10, etc.    We ask that you do not smoke 24 hours prior to surgery  We ask that you do not  drink any alcoholic beverages 24 hours prior to surgery     You must make arrangements for a responsible adult to take you home after your surgery. For your safety you will not be allowed to leave alone or drive yourself home. Your surgery will be cancelled if you do not have a ride home. Also for your safety, it is strongly suggested that someone stay with you the first 24 hours after your surgery. A parent or legal guardian must accompany a child scheduled for surgery and plan to stay at the hospital until the child is discharged. Please do not bring other children with you. For your comfort, please wear simple loose fitting clothing to the hospital.  Please do not bring valuables. Do not wear any make-up or nail polish on your fingers or toes      For your safety, please do not wear any jewelry or body piercing's on the day of surgery. All jewelry must be removed. If you have dentures, they will be removed before going to operating room. For your convenience, we will provide you with a container.     If you wear contact lenses or glasses, they will be removed, please bring a case for

## 2020-06-19 ENCOUNTER — OFFICE VISIT (OUTPATIENT)
Dept: PRIMARY CARE CLINIC | Age: 70
End: 2020-06-19
Payer: COMMERCIAL

## 2020-06-19 PROCEDURE — 99211 OFF/OP EST MAY X REQ PHY/QHP: CPT | Performed by: NURSE PRACTITIONER

## 2020-06-19 NOTE — PATIENT INSTRUCTIONS
bags, handling, and disposing of trash. Wash hands after handling or disposing of trash.  Consider consulting with your local health department about trash disposal guidance if available. Information for Household Members and Caregivers of Someone who is Sick   Call ahead before visiting your doctor   Call ahead: If you have a medical appointment, call the healthcare provider and tell them that you have or may have COVID-19. This will help the healthcare provider's office take steps to keep other people from getting infected or exposed. Wear a facemask if you are sick   ; If you are sick: You should wear a facemask when you are around other people (e.g., sharing a room or vehicle) or pets and before you enter a healthcare provider's office. ; If you are caring for others: If the person who is sick is not able to wear a facemask (for example, because it causes trouble breathing), then people who live with the person who is sick should not stay in the same room with them, or they should wear a facemask if they enter a room with the person who is sick. Cover your coughs and sneezes   ; Cover: Cover your mouth and nose with a tissue when you cough or sneeze.   ; Dispose: Throw used tissues in a lined trash can.   ; Wash hands: Immediately wash your hands with soap and water for at least 20 seconds or, if soap and water are not available, clean your hands with an alcohol-based hand  that contains at least 60% alcohol. Clean your hands often   ;  Wash hands: Wash your hands often with soap and water for at least 20 seconds, especially after blowing your nose, coughing, or sneezing; going to the bathroom; and before eating or preparing food.   ; Hand : If soap and water are not readily available, use an alcohol-based hand  with at least 60% alcohol, covering all surfaces of your hands and rubbing them together until they feel dry.   ; Soap and water: Soap and water are the best option if facility. These steps will help the healthcare provider's office to keep other people in the office or waiting room from getting infected or exposed. ; Alert health department: Ask your healthcare provider to call the local or state health department. Persons who are placed under active monitoring or facilitated self-monitoring should follow instructions provided by their local health department or occupational health professionals, as appropriate.  ; Call 911 if you have a medical emergency: If you have a medical emergency and need to call 911, notify the dispatch personnel that you have, or are being evaluated for COVID-19. If possible, put on a facemask before emergency medical services arrive.

## 2020-06-21 LAB
SARS-COV-2: NOT DETECTED
SOURCE: NORMAL

## 2020-06-24 ENCOUNTER — ANESTHESIA EVENT (OUTPATIENT)
Dept: ENDOSCOPY | Age: 70
End: 2020-06-24
Payer: COMMERCIAL

## 2020-06-25 ENCOUNTER — HOSPITAL ENCOUNTER (OUTPATIENT)
Age: 70
Setting detail: OUTPATIENT SURGERY
Discharge: HOME | End: 2020-06-25
Attending: INTERNAL MEDICINE | Admitting: INTERNAL MEDICINE
Payer: COMMERCIAL

## 2020-06-25 ENCOUNTER — ANESTHESIA (OUTPATIENT)
Dept: ENDOSCOPY | Age: 70
End: 2020-06-25
Payer: COMMERCIAL

## 2020-06-25 VITALS
WEIGHT: 182.8 LBS | SYSTOLIC BLOOD PRESSURE: 129 MMHG | RESPIRATION RATE: 16 BRPM | HEIGHT: 64 IN | DIASTOLIC BLOOD PRESSURE: 67 MMHG | TEMPERATURE: 97 F | OXYGEN SATURATION: 100 % | BODY MASS INDEX: 31.21 KG/M2 | HEART RATE: 68 BPM

## 2020-06-25 VITALS — SYSTOLIC BLOOD PRESSURE: 134 MMHG | DIASTOLIC BLOOD PRESSURE: 70 MMHG | OXYGEN SATURATION: 96 %

## 2020-06-25 PROCEDURE — 2580000003 HC RX 258: Performed by: ANESTHESIOLOGY

## 2020-06-25 PROCEDURE — 7100000010 HC PHASE II RECOVERY - FIRST 15 MIN: Performed by: INTERNAL MEDICINE

## 2020-06-25 PROCEDURE — 3700000000 HC ANESTHESIA ATTENDED CARE: Performed by: INTERNAL MEDICINE

## 2020-06-25 PROCEDURE — 7100000011 HC PHASE II RECOVERY - ADDTL 15 MIN: Performed by: INTERNAL MEDICINE

## 2020-06-25 PROCEDURE — 6360000002 HC RX W HCPCS: Performed by: NURSE ANESTHETIST, CERTIFIED REGISTERED

## 2020-06-25 PROCEDURE — 2709999900 HC NON-CHARGEABLE SUPPLY: Performed by: INTERNAL MEDICINE

## 2020-06-25 PROCEDURE — 3609010600 HC COLONOSCOPY POLYPECTOMY SNARE/COLD BIOPSY: Performed by: INTERNAL MEDICINE

## 2020-06-25 PROCEDURE — 3700000001 HC ADD 15 MINUTES (ANESTHESIA): Performed by: INTERNAL MEDICINE

## 2020-06-25 PROCEDURE — 88305 TISSUE EXAM BY PATHOLOGIST: CPT

## 2020-06-25 RX ORDER — PROPOFOL 10 MG/ML
INJECTION, EMULSION INTRAVENOUS PRN
Status: DISCONTINUED | OUTPATIENT
Start: 2020-06-25 | End: 2020-06-25 | Stop reason: SDUPTHER

## 2020-06-25 RX ORDER — PROMETHAZINE HYDROCHLORIDE 25 MG/ML
6.25 INJECTION, SOLUTION INTRAMUSCULAR; INTRAVENOUS
Status: DISCONTINUED | OUTPATIENT
Start: 2020-06-25 | End: 2020-06-25 | Stop reason: HOSPADM

## 2020-06-25 RX ORDER — SODIUM CHLORIDE 0.9 % (FLUSH) 0.9 %
10 SYRINGE (ML) INJECTION PRN
Status: DISCONTINUED | OUTPATIENT
Start: 2020-06-25 | End: 2020-06-25 | Stop reason: HOSPADM

## 2020-06-25 RX ORDER — SODIUM CHLORIDE 0.9 % (FLUSH) 0.9 %
10 SYRINGE (ML) INJECTION EVERY 12 HOURS SCHEDULED
Status: DISCONTINUED | OUTPATIENT
Start: 2020-06-25 | End: 2020-06-25 | Stop reason: HOSPADM

## 2020-06-25 RX ORDER — LABETALOL HYDROCHLORIDE 5 MG/ML
5 INJECTION, SOLUTION INTRAVENOUS EVERY 10 MIN PRN
Status: DISCONTINUED | OUTPATIENT
Start: 2020-06-25 | End: 2020-06-25 | Stop reason: HOSPADM

## 2020-06-25 RX ORDER — SODIUM CHLORIDE 9 MG/ML
INJECTION, SOLUTION INTRAVENOUS CONTINUOUS
Status: DISCONTINUED | OUTPATIENT
Start: 2020-06-25 | End: 2020-06-25 | Stop reason: HOSPADM

## 2020-06-25 RX ORDER — ONDANSETRON 2 MG/ML
4 INJECTION INTRAMUSCULAR; INTRAVENOUS
Status: DISCONTINUED | OUTPATIENT
Start: 2020-06-25 | End: 2020-06-25 | Stop reason: HOSPADM

## 2020-06-25 RX ADMIN — PROPOFOL 80 MG: 10 INJECTION, EMULSION INTRAVENOUS at 09:09

## 2020-06-25 RX ADMIN — PROPOFOL 50 MG: 10 INJECTION, EMULSION INTRAVENOUS at 09:14

## 2020-06-25 RX ADMIN — PROPOFOL 40 MG: 10 INJECTION, EMULSION INTRAVENOUS at 09:07

## 2020-06-25 RX ADMIN — SODIUM CHLORIDE: 9 INJECTION, SOLUTION INTRAVENOUS at 08:31

## 2020-06-25 RX ADMIN — PROPOFOL 50 MG: 10 INJECTION, EMULSION INTRAVENOUS at 09:11

## 2020-06-25 RX ADMIN — SODIUM CHLORIDE: 9 INJECTION, SOLUTION INTRAVENOUS at 08:58

## 2020-06-25 RX ADMIN — PROPOFOL 50 MG: 10 INJECTION, EMULSION INTRAVENOUS at 09:06

## 2020-06-25 RX ADMIN — PROPOFOL 50 MG: 10 INJECTION, EMULSION INTRAVENOUS at 09:21

## 2020-06-25 RX ADMIN — PROPOFOL 50 MG: 10 INJECTION, EMULSION INTRAVENOUS at 09:17

## 2020-06-25 RX ADMIN — PROPOFOL 80 MG: 10 INJECTION, EMULSION INTRAVENOUS at 09:04

## 2020-06-25 ASSESSMENT — PAIN - FUNCTIONAL ASSESSMENT: PAIN_FUNCTIONAL_ASSESSMENT: 0-10

## 2020-06-25 ASSESSMENT — PAIN SCALES - GENERAL
PAINLEVEL_OUTOF10: 0

## 2020-06-25 NOTE — ANESTHESIA PRE PROCEDURE
Take 1 tablet by mouth daily 90 tablet 3    vitamin E 400 UNIT capsule Take 400 Units by mouth daily      Cholecalciferol (VITAMIN D3) 1000 UNITS CAPS   Take by mouth daily  30 capsule     Garlic 7732 MG TABS Take  by mouth daily.  Coenzyme Q10 (CO Q-10) 100 MG CAPS Take  by mouth daily.  Cinnamon 500 MG TABS Take  by mouth daily.  calcium carbonate 600 MG TABS tablet Take 1 tablet by mouth daily.  Ginkgo Biloba 40 MG TABS Take 40 mg by mouth daily.  Multiple Vitamin (MULTIVITAMIN PO) Take  by mouth daily.  Pyridoxine HCl (VITAMIN B-6 PO) Take  by mouth daily.  fish oil-omega-3 fatty acids 1000 MG capsule Take 1 g by mouth daily       vitamin B-12 (CYANOCOBALAMIN) 100 MCG tablet Take 100 mcg by mouth daily.  Flaxseed, Linseed, (FLAX SEED OIL) 1000 MG CAPS Take 1,000 mg by mouth daily.  Ascorbic Acid (VITAMIN C) 500 MG tablet Take 500 mg by mouth daily.  furosemide (LASIX) 20 MG tablet Take 1 tablet by mouth daily as needed (swelling) 90 tablet 0     Current Facility-Administered Medications   Medication Dose Route Frequency Provider Last Rate Last Dose    sodium chloride flush 0.9 % injection 10 mL  10 mL Intravenous 2 times per day Fco Smith MD        sodium chloride flush 0.9 % injection 10 mL  10 mL Intravenous PRN Fco Smith MD        0.9 % sodium chloride infusion   Intravenous Continuous Fco Smith MD         Vital Signs (Current) There were no vitals filed for this visit. Vital Signs Statistics (for past 48 hrs)     No data recorded    BP Readings from Last 3 Encounters:   06/03/20 120/78   06/02/20 129/70   12/05/19 126/82     BMI  Body mass index is 31.41 kg/m². Estimated body mass index is 31.41 kg/m² as calculated from the following:    Height as of this encounter: 5' 4\" (1.626 m). Weight as of this encounter: 183 lb (83 kg).     CBC   Lab Results   Component Value Date    WBC 5.5 06/03/2020    RBC 4.51

## 2020-06-25 NOTE — H&P
(MULTIVITAMIN PO) Take  by mouth daily. Yes Historical Provider, MD   Pyridoxine HCl (VITAMIN B-6 PO) Take  by mouth daily. Yes Historical Provider, MD   fish oil-omega-3 fatty acids 1000 MG capsule Take 1 g by mouth daily    Yes Historical Provider, MD   vitamin B-12 (CYANOCOBALAMIN) 100 MCG tablet Take 100 mcg by mouth daily. Yes Historical Provider, MD   Flaxseed, Linseed, (FLAX SEED OIL) 1000 MG CAPS Take 1,000 mg by mouth daily. Yes Historical Provider, MD   Ascorbic Acid (VITAMIN C) 500 MG tablet Take 500 mg by mouth daily. Yes Historical Provider, MD   furosemide (LASIX) 20 MG tablet Take 1 tablet by mouth daily as needed (swelling) 2/23/17   Sharif Nguyen MD     Allergies:    Patient has no known allergies. Social History:   Social History     Socioeconomic History    Marital status:      Spouse name: Not on file    Number of children: Not on file    Years of education: Not on file    Highest education level: Not on file   Occupational History    Not on file   Social Needs    Financial resource strain: Not on file    Food insecurity     Worry: Not on file     Inability: Not on file    Transportation needs     Medical: Not on file     Non-medical: Not on file   Tobacco Use    Smoking status: Never Smoker    Smokeless tobacco: Never Used    Tobacco comment: avoid tobacco   Substance and Sexual Activity    Alcohol use: Yes     Alcohol/week: 0.0 standard drinks     Comment: Has an occasional drink on holidays.      Drug use: No    Sexual activity: Yes     Partners: Male     Comment:    Lifestyle    Physical activity     Days per week: Not on file     Minutes per session: Not on file    Stress: Not on file   Relationships    Social connections     Talks on phone: Not on file     Gets together: Not on file     Attends Confucianism service: Not on file     Active member of club or organization: Not on file     Attends meetings of clubs or organizations: Not on file

## 2020-06-29 ENCOUNTER — HOSPITAL ENCOUNTER (OUTPATIENT)
Dept: WOMENS IMAGING | Age: 70
Discharge: HOME | End: 2020-06-29
Payer: COMMERCIAL

## 2020-06-29 PROCEDURE — 77063 BREAST TOMOSYNTHESIS BI: CPT

## 2020-07-10 RX ORDER — ATORVASTATIN CALCIUM 10 MG/1
TABLET, FILM COATED ORAL
Qty: 90 TABLET | Refills: 3 | Status: SHIPPED | OUTPATIENT
Start: 2020-07-10 | End: 2021-04-28

## 2020-07-10 RX ORDER — VALSARTAN AND HYDROCHLOROTHIAZIDE 160; 25 MG/1; MG/1
TABLET ORAL
Qty: 90 TABLET | Refills: 3 | Status: SHIPPED | OUTPATIENT
Start: 2020-07-10 | End: 2021-04-28

## 2020-07-10 NOTE — TELEPHONE ENCOUNTER
Requested Prescriptions     Pending Prescriptions Disp Refills    valsartan-hydroCHLOROthiazide (DIOVAN-HCT) 160-25 MG per tablet [Pharmacy Med Name: Roena Madai HCT -25MG] 90 tablet 3     Sig: TAKE 1 TABLET DAILY    atorvastatin (LIPITOR) 10 MG tablet [Pharmacy Med Name: ATORVASTATIN TAB 10MG] 90 tablet 3     Sig: TAKE 1 TABLET DAILY          Number: 90    Refills: 3    Last Office Visit: 06/03/2020    Next Office Visit: 12/4/2020     Last Refill: valsartan-HCTZ 04/18/2019 Atorvastatin 07/30/2019     Last Labs: 06/19/2020 COVID19/ 06/03/2020 Vitamin D/ Lipid/BNP/CMP/CBC

## 2020-12-02 NOTE — PROGRESS NOTES
by mouth daily. , Disp: , Rfl:     fish oil-omega-3 fatty acids 1000 MG capsule, Take 1 g by mouth daily , Disp: , Rfl:     vitamin B-12 (CYANOCOBALAMIN) 100 MCG tablet, Take 100 mcg by mouth daily. , Disp: , Rfl:     Flaxseed, Linseed, (FLAX SEED OIL) 1000 MG CAPS, Take 1,000 mg by mouth daily. , Disp: , Rfl:     Ascorbic Acid (VITAMIN C) 500 MG tablet, Take 500 mg by mouth daily.   , Disp: , Rfl:     furosemide (LASIX) 20 MG tablet, Take 1 tablet by mouth daily as needed (swelling) (Patient not taking: Reported on 12/4/2020), Disp: 90 tablet, Rfl: 0      Immunization History   Administered Date(s) Administered    Influenza 11/30/2015    Influenza Virus Vaccine 10/15/2014    Influenza Whole 09/23/2009    Influenza, High Dose (Fluzone 65 yrs and older) 11/30/2015, 12/12/2016, 10/30/2018, 11/05/2019    Pneumococcal Conjugate 13-valent (Gutsdlv29) 08/13/2015    Pneumococcal Polysaccharide ([id number]) 04/26/2013, 12/12/2016    Tdap (Boostrix, Adacel) 04/26/2013    Zoster Live (Zostavax) 06/11/2013     Patient Active Problem List   Diagnosis    Pure hypercholesterolemia    Raynauds syndrome    GERD (gastroesophageal reflux disease)    Hypertension    CREST syndrome ([address]    PAH (pulmonary artery hypertension) ([address]    Vitamin D deficiency    Osteopenia    Sigmoid diverticulosis    Adenomatous polyp     Past Medical History:   Diagnosis Date    Adenomatous polyp 7/17/2015    negative for high grade dysplasiaManegold    CREST syndrome ([address]     Hypertension     Osteopenia 7/22/10    LS -1.15    Pure hypercholesterolemia     Raynauds syndrome     [place]    Sigmoid diverticulosis 7/17/2015    [place]    Vitamin D deficiency      Past Surgical History:   Procedure Laterality Date    BREAST BIOPSY  2008    right breast    CARDIAC CATHETERIZATION  11/27/12    Right Heart Cath    COLONOSCOPY N/A 6/25/2020    COLONOSCOPY POLYPECTOMY SNARE/COLD BIOPSY performed by Madhu Mal[facility] at [facility]    EYE SURGERY Left 08/2019     Family History   Problem Relation Age of Onset    Heart Disease Mother 80        coronary stents    Cancer Father 46        bladder cancer, he was a smoker    High Cholesterol Sister     No Known Problems Brother      Social History     Socioeconomic History    Marital status:      Spouse name: Not on file    Number of children: Not on file    Years of education: Not on file    Highest education level: Not on file   Occupational History    Not on file   Social Needs    Financial resource strain: Not on file    Food insecurity     Worry: Not on file     Inability: Not on file    Transportation needs     Medical: Not on file     Non-medical: Not on file   Tobacco Use    Smoking status: Never Smoker    Smokeless tobacco: Never Used    Tobacco comment: avoid tobacco   Substance and Sexual Activity    Alcohol use: Yes     Alcohol/week: 0.0 standard drinks     Comment: Has an occasional drink on holidays.  Drug use: No    Sexual activity: Yes     Partners: Male     Comment:    Lifestyle    Physical activity     Days per week: Not on file     Minutes per session: Not on file    Stress: Not on file   Relationships    Social connections     Talks on phone: Not on file     Gets together: Not on file     Attends [employer] service: Not on file     Active member of club or organization: Not on file     Attends meetings of clubs or organizations: Not on file     Relationship status: Not on file    Intimate partner violence     Fear of current or ex partner: Not on file     Emotionally abused: Not on file     Physically abused: Not on file     Forced sexual activity: Not on file   Other Topics Concern    Not on file   Social History Narrative    Not on file     Review of Systems:   · Constitutional: there has been no unanticipated weight loss. There's been no change in energy level, sleep pattern, or activity level.      · Eyes: No visual edema in ankles  · Pedal Pulses: 2+ and equal   Abdomen:  · No masses or tenderness  · Liver/Spleen: No Abnormalities Noted  Neurological/Psychiatric:  · Alert and oriented in all spheres  · Moves all extremities well  · Exhibits normal gait balance and coordination  · No abnormalities of mood, affect, memory, mentation, or behavior are noted    Lab Data:  CBC:   Lab Results   Component Value Date    WBC 5.5 06/03/2020    WBC 5.5 12/05/2019    WBC 4.9 01/02/2019    RBC 4.51 06/03/2020    RBC 4.38 12/05/2019    RBC 4.37 01/02/2019    HGB 13.2 06/03/2020    HGB 12.8 12/05/2019    HGB 12.6 01/02/2019    HCT 40.0 06/03/2020    HCT 38.6 12/05/2019    HCT 38.4 01/02/2019    MCV 88.7 06/03/2020    MCV 88.1 12/05/2019    MCV 87.8 01/02/2019    RDW 13.7 06/03/2020    RDW 13.7 12/05/2019    RDW 14.2 01/02/2019     06/03/2020     12/05/2019     01/02/2019     BMP:   Lab Results   Component Value Date     06/03/2020     12/05/2019     01/02/2019    K 4.5 06/03/2020    K 3.9 12/05/2019    K 4.4 01/02/2019     06/03/2020     12/05/2019     01/02/2019    CO2 26 06/03/2020    CO2 27 12/05/2019    CO2 28 01/02/2019    PHOS 3.9 11/29/2012    BUN 17 06/03/2020    BUN 14 12/05/2019    BUN 12 01/02/2019    CREATININE 0.8 06/03/2020    CREATININE 0.7 12/05/2019    CREATININE 0.6 11/05/2019     BNP:   Lab Results   Component Value Date    BNP 40 03/03/2014    BNP 28 11/29/2012    BNP 52 08/10/2012     FASTING LIPID PANEL:  Lab Results   Component Value Date    CHOL 144 06/03/2020    HDL 64 06/03/2020    HDL 65 01/23/2012    TRIG 86 06/03/2020     Testing:     Echo  12/2/19  Summary   *Left ventricle - normal size, thickness and function with EF of 60%   *Mitral valve - mild regurgitation   *Tricuspid valve - mild regurgitation with PASP of 31mmHg    Echo 12/10/2018  Normal left ventricle size, wall thickness and systolic function with an  estimated ejection fraction of 60%.  No regional wall motion abnormalities  are seen. E/e\"= 05.9  Normal diastolic function. Mild mitral regurgitation is present. There is mild-moderate tricuspid regurgitation with RVSP estimated at 30  mmHg    Echo 12/21/2017    Normal left ventricle size, wall thickness and systolic function with an   estimated ejection fraction of 60%. No regional wall motion abnormalities   are seen.   E/e\"= 10. Normal diastolic function.   Mild mitral regurgitation.   Mild-moderate tricuspid regurgitation with RVSP estimated at 34 mmHg.   Trivial pulmonic regurgitation.       Echo 12/2/2016  RVSP 34 mm Hg essentially unchanged from 8/2015. Echo 8/2015  Normal left ventricle size, wall thickness and systolic function with an estimated ejection fraction of 55%. -No regional wall motion abnormalities  are seen. Mild mitral regurgitation is present. There is mild-moderate tricuspid regurgitation with RVSP estimated at 35 mmHG    [facility] 11/29/12 - RA: 7  V: 43/8  PA: 42/14, mean 25  PCWP: 10  Thermodilution CO: 6.47 Thermodilution CI: 3.42   Jaki CO: 5.51  Jaki CI: 2.92  PA Sat: 72.5%  PVR: 161 dynes      Labs were reviewed including labs from other hospital systems through [facility]. Cardiac testing was reviewed including echos, nuclear scans, cardiac catheterization, including from other hospital systems through [facility]. Assessment:  1. PAH (pulmonary artery hypertension) ([address]    2. Pure hypercholesterolemia    3. CREST syndrome    4. Essential hypertension    5. SOB (shortness of breath)    6. Vitamin D deficiency        1. PAH (pulmonary artery hypertension): Stable. ProBNP 6/3/20> 186  ECHO 8/28/2015> RVSP 35 mmHg. ECHO 12/16> EF 55%, RVSP 34mmHg. ECHO 12/2/19> EF 60% and RVSP 31mmHg. ECHO today 12/4/2020> EF 65%     2. Essential hypertension: Stable   Vitals:    12/04/20 1103   BP: 132/80   Pulse: 76   SpO2: 99%       3. CREST syndrome: Per Dr. Damien Stephenson.    -[facility] 11/26/2012> recommended starting Endothelin

## 2020-12-04 ENCOUNTER — HOSPITAL ENCOUNTER (OUTPATIENT)
Dept: NON INVASIVE DIAGNOSTICS | Age: 70
Discharge: HOME | End: 2020-12-04
Payer: COMMERCIAL

## 2020-12-04 ENCOUNTER — OFFICE VISIT (OUTPATIENT)
Dept: CARDIOLOGY CLINIC | Age: 70
End: 2020-12-04
Payer: COMMERCIAL

## 2020-12-04 VITALS
OXYGEN SATURATION: 99 % | BODY MASS INDEX: 31.76 KG/M2 | DIASTOLIC BLOOD PRESSURE: 80 MMHG | SYSTOLIC BLOOD PRESSURE: 132 MMHG | HEIGHT: 64 IN | WEIGHT: 186 LBS | HEART RATE: 76 BPM

## 2020-12-04 LAB
LV EF: 58 %
LVEF MODALITY: NORMAL

## 2020-12-04 PROCEDURE — 93306 TTE W/DOPPLER COMPLETE: CPT

## 2020-12-04 PROCEDURE — 99214 OFFICE O/P EST MOD 30 MIN: CPT | Performed by: INTERNAL MEDICINE

## 2020-12-07 ENCOUNTER — TELEPHONE (OUTPATIENT)
Dept: CARDIOLOGY CLINIC | Age: 70
End: 2020-12-07

## 2020-12-07 NOTE — TELEPHONE ENCOUNTER
I referred pt to billing. She stated that her insurance was supposedly waiving copays since September. She attempted to contact billnig to see where the issue was. I explained that registrars collects what the insurance tells us to, so billing would be the best option. She stated that she tried to contact but was on hold so long that she gave up.

## 2020-12-07 NOTE — TELEPHONE ENCOUNTER
Pt calling she has some questions about her co pays her insurance said they are not charging co pays from sept 1st to the end of the year.  Pt wants to know who can she speak with about this? pls call to advise thank you

## 2020-12-08 NOTE — TELEPHONE ENCOUNTER
I spoke with Ms. Marissa Emery and explained that the registrars don't know which insurances are waiving co-pays and which aren't, but that if she tells them at check-in they will not collect the co-pay. As it stands, she has a credit of $40 on her account. This will either go toward her outstanding balance if she has one or will remain as credit on her account. A refund can be requested once all claims are processed and any outstanding balances are cleared. She was satisfied with this answer and states she was not aware her insurance was waiving co-pays until after her visit.

## 2020-12-15 ENCOUNTER — HOSPITAL ENCOUNTER (OUTPATIENT)
Age: 70
Discharge: HOME | End: 2020-12-15
Payer: COMMERCIAL

## 2020-12-15 LAB
A/G RATIO: 1.7 (ref 1.1–2.2)
ALBUMIN SERPL-MCNC: 4.1 G/DL (ref 3.4–5)
ALP BLD-CCNC: 72 U/L (ref 40–129)
ALT SERPL-CCNC: 18 U/L (ref 10–40)
ANION GAP SERPL CALCULATED.3IONS-SCNC: 5 MMOL/L (ref 3–16)
AST SERPL-CCNC: 21 U/L (ref 15–37)
BASOPHILS ABSOLUTE: 0 K/UL (ref 0–0.2)
BASOPHILS RELATIVE PERCENT: 0.6 %
BILIRUB SERPL-MCNC: 0.4 MG/DL (ref 0–1)
BUN BLDV-MCNC: 16 MG/DL (ref 7–20)
CALCIUM SERPL-MCNC: 9.4 MG/DL (ref 8.3–10.6)
CHLORIDE BLD-SCNC: 102 MMOL/L (ref 99–110)
CHOLESTEROL, TOTAL: 140 MG/DL (ref 0–199)
CO2: 29 MMOL/L (ref 21–32)
CREAT SERPL-MCNC: 0.7 MG/DL (ref 0.6–1.2)
EOSINOPHILS ABSOLUTE: 0.1 K/UL (ref 0–0.6)
EOSINOPHILS RELATIVE PERCENT: 2.9 %
GFR AFRICAN AMERICAN: >60
GFR NON-AFRICAN AMERICAN: >60
GLOBULIN: 2.4 G/DL
GLUCOSE BLD-MCNC: 90 MG/DL (ref 70–99)
HCT VFR BLD CALC: 38 % (ref 36–48)
HDLC SERPL-MCNC: 56 MG/DL (ref 40–60)
HEMOGLOBIN: 12.6 G/DL (ref 12–16)
LDL CHOLESTEROL CALCULATED: 66 MG/DL
LYMPHOCYTES ABSOLUTE: 0.9 K/UL (ref 1–5.1)
LYMPHOCYTES RELATIVE PERCENT: 19 %
MCH RBC QN AUTO: 29.3 PG (ref 26–34)
MCHC RBC AUTO-ENTMCNC: 33.3 G/DL (ref 31–36)
MCV RBC AUTO: 87.9 FL (ref 80–100)
MONOCYTES ABSOLUTE: 0.4 K/UL (ref 0–1.3)
MONOCYTES RELATIVE PERCENT: 8.7 %
NEUTROPHILS ABSOLUTE: 3.3 K/UL (ref 1.7–7.7)
NEUTROPHILS RELATIVE PERCENT: 68.8 %
PDW BLD-RTO: 14.3 % (ref 12.4–15.4)
PLATELET # BLD: 155 K/UL (ref 135–450)
PMV BLD AUTO: 9.5 FL (ref 5–10.5)
POTASSIUM SERPL-SCNC: 4.4 MMOL/L (ref 3.5–5.1)
PRO-BNP: 147 PG/ML (ref 0–124)
RBC # BLD: 4.32 M/UL (ref 4–5.2)
SODIUM BLD-SCNC: 136 MMOL/L (ref 136–145)
TOTAL PROTEIN: 6.5 G/DL (ref 6.4–8.2)
TRIGL SERPL-MCNC: 91 MG/DL (ref 0–150)
VITAMIN D 25-HYDROXY: 40.8 NG/ML
VLDLC SERPL CALC-MCNC: 18 MG/DL
WBC # BLD: 4.8 K/UL (ref 4–11)

## 2020-12-15 PROCEDURE — 85025 COMPLETE CBC W/AUTO DIFF WBC: CPT

## 2020-12-15 PROCEDURE — 36415 COLL VENOUS BLD VENIPUNCTURE: CPT

## 2020-12-15 PROCEDURE — 80061 LIPID PANEL: CPT

## 2020-12-15 PROCEDURE — 82306 VITAMIN D 25 HYDROXY: CPT

## 2020-12-15 PROCEDURE — 80053 COMPREHEN METABOLIC PANEL: CPT

## 2020-12-15 PROCEDURE — 83880 ASSAY OF NATRIURETIC PEPTIDE: CPT

## 2020-12-21 ENCOUNTER — VIRTUAL VISIT (OUTPATIENT)
Dept: RHEUMATOLOGY | Age: 70
End: 2020-12-21
Payer: COMMERCIAL

## 2020-12-21 PROCEDURE — 99213 OFFICE O/P EST LOW 20 MIN: CPT | Performed by: INTERNAL MEDICINE

## 2020-12-21 NOTE — PROGRESS NOTES
Stephy Luke is a 65 y.o. female being evaluated by a Virtual Visit (video visit) encounter to address concerns as mentioned above. A caregiver was present when appropriate. Due to this being a TeleHealth encounter (During PDKYI-75 public health emergency), evaluation of the following organ systems was limited: Vitals/Constitutional/EENT/Resp/CV/GI//MS/Neuro/Skin/Heme-Lymph-Imm. Pursuant to the emergency declaration under the [address] [place] [address] and the [employer] Act, this Virtual Visit was conducted with patient's (and/or legal guardian's) consent, to reduce the patient's risk of exposure to COVID-19 and provide necessary medical care. The patient (and/or legal guardian) has also been advised to contact this office for worsening conditions or problems, and seek emergency medical treatment and/or call 911 if deemed necessary. Patient identification was verified at the start of the visit: Yes    Total time spent for this encounter: Not billed by time    Services were provided through a video synchronous discussion virtually to substitute for in-person clinic visit. Patient and provider were located at their individual homes. --Kaylah Dick RN on 12/21/2020 at 3:31 PM    An electronic signature was used to authenticate this note.

## 2021-01-05 ENCOUNTER — VIRTUAL VISIT (OUTPATIENT)
Dept: FAMILY MEDICINE CLINIC | Age: 71
End: 2021-01-05
Payer: COMMERCIAL

## 2021-01-05 DIAGNOSIS — L30.9 DERMATITIS: ICD-10-CM

## 2021-01-05 DIAGNOSIS — I27.21 PAH (PULMONARY ARTERY HYPERTENSION) (HCC): ICD-10-CM

## 2021-01-05 DIAGNOSIS — M34.1 CREST SYNDROME (HCC): ICD-10-CM

## 2021-01-05 DIAGNOSIS — Z00.00 MEDICARE ANNUAL WELLNESS VISIT, SUBSEQUENT: Primary | ICD-10-CM

## 2021-01-05 DIAGNOSIS — Z00.00 ROUTINE GENERAL MEDICAL EXAMINATION AT A HEALTH CARE FACILITY: ICD-10-CM

## 2021-01-05 DIAGNOSIS — I10 ESSENTIAL HYPERTENSION: ICD-10-CM

## 2021-01-05 DIAGNOSIS — I73.00 RAYNAUD'S DISEASE WITHOUT GANGRENE: ICD-10-CM

## 2021-01-05 PROCEDURE — G0439 PPPS, SUBSEQ VISIT: HCPCS | Performed by: FAMILY MEDICINE

## 2021-01-05 SDOH — ECONOMIC STABILITY: INCOME INSECURITY: HOW HARD IS IT FOR YOU TO PAY FOR THE VERY BASICS LIKE FOOD, HOUSING, MEDICAL CARE, AND HEATING?: NOT HARD AT ALL

## 2021-01-05 SDOH — ECONOMIC STABILITY: TRANSPORTATION INSECURITY
IN THE PAST 12 MONTHS, HAS LACK OF TRANSPORTATION KEPT YOU FROM MEETINGS, WORK, OR FROM GETTING THINGS NEEDED FOR DAILY LIVING?: NO

## 2021-01-05 ASSESSMENT — LIFESTYLE VARIABLES
HOW OFTEN DURING THE LAST YEAR HAVE YOU NEEDED AN ALCOHOLIC DRINK FIRST THING IN THE MORNING TO GET YOURSELF GOING AFTER A NIGHT OF HEAVY DRINKING: 0
HOW OFTEN DO YOU HAVE SIX OR MORE DRINKS ON ONE OCCASION: 0
AUDIT-C TOTAL SCORE: 1
HOW MANY STANDARD DRINKS CONTAINING ALCOHOL DO YOU HAVE ON A TYPICAL DAY: 0
HOW OFTEN DURING THE LAST YEAR HAVE YOU FOUND THAT YOU WERE NOT ABLE TO STOP DRINKING ONCE YOU HAD STARTED: 0
HOW OFTEN DO YOU HAVE A DRINK CONTAINING ALCOHOL: 1
HOW OFTEN DURING THE LAST YEAR HAVE YOU FAILED TO DO WHAT WAS NORMALLY EXPECTED FROM YOU BECAUSE OF DRINKING: 0

## 2021-01-05 NOTE — PROGRESS NOTES
Medicare Annual Wellness Visit  Name: Vida Snow Date: 2021   MRN: [id number] Sex: Female   Age: 65 y.o. Ethnicity: Non-/Non    : 1950 Race: Sarah Khanna is here for Medicare [facility] MEDICARE WELLNESS VISIT)    Recent labs 12/15/20 -   , TG 91, HDL 56, LDL 66  Vit D 40.8  Normal cmp/ cbc/ bnp  Sees dr. Zuhair Espinal for [facility]  Echo done 20 -    Summary   -Overall left ventricular function is normal.   -Ejection fraction is visually estimated to be 55-60 %. E/e'= 8.4   -No regional wall motion abnormalities are noted. -Grade I diastolic dysfunction with normal LV filling pressures. -Mild tricuspid regurgitation. Estimated pulmonary artery systolic pressure is mildly to moderately   elevated but unable to measure exact PASP due to incomplete TR envelope. PAH related to CREST - took lataris for awhile but swelling so stopped - off meds since   bp's have been good on home checks. On norvasc for raynauds - seems to help - mainly fingers - not cold weather person. Sees dr. David Worthington for CREST syndrome - some am joint stiffness -   Up and down steps a lot in house - wears fit bit - no formal exercise. Diet/ weight stable. Sleep off and on - some nights good - occ wakes up but able to go back to sleep okay. Vision/ hearing stable - had cataracts surgery ou early in year. Gets halos at night. No near falls - problems w/ gait.   No swelling   Dry patch - darker pink inside left lower leg above ankle  BP Readings from Last 3 Encounters:   20 132/80   20 134/70   20 129/67     Pulse Readings from Last 3 Encounters:   20 76   20 68   20 79     Wt Readings from Last 3 Encounters:   20 186 lb (84.4 kg)   20 182 lb 12.8 oz (82.9 kg)   20 183 lb (83 kg)     Rash down by ankle - not raised/ itchy Screenings for behavioral, psychosocial and functional/safety risks, and cognitive dysfunction are all negative except as indicated below. These results, as well as other patient data from the [address] form, are documented in Flowsheets linked to this Encounter. No Known Allergies    Prior to Visit Medications    Medication Sig Taking? Authorizing Provider   atorvastatin (LIPITOR) 10 MG tablet TAKE 1 TABLET DAILY Yes Michelle Garnica MD   aspirin 81 MG EC tablet Take 1 tablet by mouth daily Yes EM Felton CNP   amLODIPine (NORVASC) 5 MG tablet Take 1 tablet by mouth daily Yes Michelle Garnica MD   furosemide (LASIX) 20 MG tablet Take 1 tablet by mouth daily as needed (swelling) Yes Micaela Arroyo MD   Cholecalciferol (VITAMIN D3) 1000 UNITS CAPS Take 1 capsule by mouth daily  Yes Noemi Dhaliwal MD   Coenzyme Q10 (CO Q-10) 100 MG CAPS Take 1 capsule by mouth daily  Yes Historical Provider, MD   Cinnamon 500 MG TABS Take 1 tablet by mouth daily  Yes Historical Provider, MD   calcium carbonate 600 MG TABS tablet Take 1 tablet by mouth daily. Yes Historical Provider, MD   fish oil-omega-3 fatty acids 1000 MG capsule Take 1 g by mouth daily  Yes Historical Provider, MD   Flaxseed, Linseed, (FLAX SEED OIL) 1000 MG CAPS Take 1,000 mg by mouth daily. Yes Historical Provider, MD   Ascorbic Acid (VITAMIN C) 500 MG tablet Take 500 mg by mouth daily. Yes Historical Provider, MD   valsartan-hydroCHLOROthiazide (DIOVAN-HCT) 160-25 MG per tablet TAKE 1 TABLET DAILY  Michelle Garnica MD   vitamin E 400 UNIT capsule Take 400 Units by mouth daily  Historical Provider, MD   Garlic 7646 MG TABS Take  by mouth daily. Historical Provider, MD   Ginkgo Biloba 40 MG TABS Take 40 mg by mouth daily. Historical Provider, MD   Multiple Vitamin (MULTIVITAMIN PO) Take  by mouth daily. Historical Provider, MD   Pyridoxine HCl (VITAMIN B-6 PO) Take  by mouth daily.   Historical Provider, MD vitamin B-12 (CYANOCOBALAMIN) 100 MCG tablet Take 100 mcg by mouth daily. Historical Provider, MD       Past Medical History:   Diagnosis Date    Adenomatous polyp 7/17/2015    negative for high grade dysplasiaManegold    CREST syndrome ([facility] [address]     Hypertension     Osteopenia 7/22/10    LS -1.15    Pure hypercholesterolemia     Raynauds syndrome     [place]    Sigmoid diverticulosis 7/17/2015    [place]    Vitamin D deficiency        Past Surgical History:   Procedure Laterality Date    BREAST BIOPSY  2008    right breast    CARDIAC CATHETERIZATION  11/27/12    Right Heart Cath    COLONOSCOPY N/A 6/25/2020    COLONOSCOPY POLYPECTOMY SNARE/COLD BIOPSY performed by Jacinta Jones MD at [address] [place] [address] 08/2019       Family History   Problem Relation Age of Onset    Heart Disease Mother 80        coronary stents    Cancer Father 46        bladder cancer, he was a smoker    High Cholesterol Sister     No Known Problems Brother        CareTeam (Including outside providers/suppliers regularly involved in providing care):   Patient Care Team:  Beatrice Colon MD as PCP - Maximiliano Davila MD as PCP - [facility] Empaneled Provider  Bill Garcia MD as Consulting Physician (Cardiology)    Wt Readings from Last 3 Encounters:   12/04/20 186 lb (84.4 kg)   06/25/20 182 lb 12.8 oz (82.9 kg)   06/03/20 183 lb (83 kg)     Patient-Reported Vitals 1/5/2021   Patient-Reported Weight 183 LB   Patient-Reported Height 64\"   Patient-Reported Systolic 744   Patient-Reported Diastolic 73      There is no height or weight on file to calculate BMI. Based upon direct observation of the patient, evaluation of cognition reveals recent and remote memory intact.  3 word recall/ CDT normal  No mood concerns generally    No recent falls in last year to justify get up and go test  No concerns for balance/ gait steadyness Patient's complete Health Risk Assessment and screening values have been reviewed and are found in Flowsheets. The following problems were reviewed today and where indicated follow up appointments were made and/or referrals ordered. Positive Risk Factor Screenings with Interventions:           Health Habits/Nutrition:        Health Habits/Nutrition Interventions:  · diet/ activity dw/ pt       Personalized Preventive Plan   Current Health Maintenance Status  Immunization History   Administered Date(s) Administered    Influenza 11/30/2015    Influenza Virus Vaccine 10/15/2014    Influenza Whole 09/23/2009    Influenza, High Dose (Fluzone 65 yrs and older) 11/30/2015, 12/12/2016, 10/30/2018, 11/05/2019    Pneumococcal Conjugate 13-valent ([id number]) 08/13/2015    Pneumococcal Polysaccharide ([id number]) 04/26/2013, 12/12/2016    Tdap (Boostrix, Adacel) 04/26/2013    Zoster Live (Zostavax) 06/11/2013        Health Maintenance   Topic Date Due    Hepatitis C screen  1950    Shingles Vaccine (2 of 3) 08/06/2013    Annual Wellness Visit (AWV)  06/11/2019    Lipid screen  12/15/2021    Potassium monitoring  12/15/2021    Creatinine monitoring  12/15/2021    Breast cancer screen  06/29/2022    DTaP/Tdap/Td vaccine (2 - Td) 04/26/2023    Colon cancer screen colonoscopy  06/25/2025    DEXA (modify frequency per FRAX score)  Completed    Flu vaccine  Completed    Pneumococcal 65+ years Vaccine  Completed    Hepatitis A vaccine  Aged Out    Hepatitis B vaccine  Aged Out    Hib vaccine  Aged Out    Meningococcal (ACWY) vaccine  Aged Out     Recommendations for ACE*COMM Due: see orders and patient instructions/AVS.  . Recommended screening schedule for the next 5-10 years is provided to the patient in written form: see Patient Instructions/AVS.    There are no diagnoses linked to this encounter. Diagnosis Orders   1.  Medicare annual wellness visit, subsequent 2. PAH (pulmonary artery hypertension) ([facility] [address]     3. CREST syndrome ([facility] [address]     4. Raynaud's disease without gangrene     5. Essential hypertension     6. Dermatitis       cpm  Stable overall - doing well for most part  Moisturizer/ otc hc cream bid to spot on skin  Hydrate well  Reviewed recent specialist notes/ testing  F/u prn  Immunizations utd - consider shingrix and covid vaccine - had zostavax  dexa 9/18 -osteopenia - frax 11/2.1% - repeat planned 9/21  mmg okay 6/20  colonscopy done 6/20 - hx of polyps - repeat 5 years planned - dr. Palomo Lopes is a 65 y.o. female being evaluated by a Virtual Visit (video and audio) encounter to address concerns as mentioned above. A caregiver was present when appropriate. Due to this being a TeleHealth encounter (During BSJNV-42 public health emergency), evaluation of the following organ systems was limited: Vitals/Constitutional/EENT/Resp/CV/GI//MS/Neuro/Skin/Heme-Lymph-Imm. Pursuant to the emergency declaration under the [address] [place] [address] authority and the [employer] Act, this Virtual Visit was conducted with patient's (and/or legal guardian's) consent, to reduce the patient's risk of exposure to COVID-19 and provide necessary medical care. The patient (and/or legal guardian) has also been advised to contact this office for worsening conditions or problems, and seek emergency medical treatment and/or call 911 if deemed necessary. Patient identification was verified at the start of the visit: Yes    Services were provided through a video synchronous discussion virtually to substitute for in-person clinic visit. Patient and provider were located at their individual homes. --Shelly Holloway MD on 1/5/2021 at 9:25 AM    An electronic signature was used to authenticate this note.

## 2021-02-13 DIAGNOSIS — M34.1 CREST SYNDROME (HCC): ICD-10-CM

## 2021-02-13 DIAGNOSIS — I10 ESSENTIAL HYPERTENSION: ICD-10-CM

## 2021-02-13 DIAGNOSIS — I73.00 RAYNAUD'S DISEASE WITHOUT GANGRENE: ICD-10-CM

## 2021-02-13 DIAGNOSIS — R06.02 SOB (SHORTNESS OF BREATH): ICD-10-CM

## 2021-02-13 DIAGNOSIS — I27.21 PAH (PULMONARY ARTERY HYPERTENSION) (HCC): ICD-10-CM

## 2021-02-13 DIAGNOSIS — E78.00 PURE HYPERCHOLESTEROLEMIA: ICD-10-CM

## 2021-02-15 RX ORDER — AMLODIPINE BESYLATE 5 MG/1
TABLET ORAL
Qty: 90 TABLET | Refills: 3 | Status: SHIPPED | OUTPATIENT
Start: 2021-02-15 | End: 2021-12-23

## 2021-02-19 ENCOUNTER — OFFICE VISIT (OUTPATIENT)
Dept: FAMILY MEDICINE CLINIC | Age: 71
End: 2021-02-19
Payer: COMMERCIAL

## 2021-02-19 VITALS
DIASTOLIC BLOOD PRESSURE: 74 MMHG | HEART RATE: 80 BPM | BODY MASS INDEX: 32.74 KG/M2 | OXYGEN SATURATION: 98 % | WEIGHT: 191.8 LBS | TEMPERATURE: 97.7 F | SYSTOLIC BLOOD PRESSURE: 122 MMHG | HEIGHT: 64 IN

## 2021-02-19 DIAGNOSIS — M79.89 LEG SWELLING: Primary | ICD-10-CM

## 2021-02-19 PROCEDURE — 99213 OFFICE O/P EST LOW 20 MIN: CPT | Performed by: NURSE PRACTITIONER

## 2021-02-19 RX ORDER — FUROSEMIDE 20 MG/1
20 TABLET ORAL DAILY PRN
Qty: 30 TABLET | Refills: 0 | Status: SHIPPED | OUTPATIENT
Start: 2021-02-19 | End: 2022-01-10 | Stop reason: SDUPTHER

## 2021-02-19 ASSESSMENT — ENCOUNTER SYMPTOMS
SHORTNESS OF BREATH: 0
WHEEZING: 0

## 2021-02-19 NOTE — PROGRESS NOTES
Negative for decreased concentration and dysphoric mood. The patient is not nervous/anxious. Physical Exam  Constitutional:       General: She is not in acute distress. Appearance: Normal appearance. She is obese. Cardiovascular:      Pulses: Normal pulses. Heart sounds: Normal heart sounds. No murmur. No gallop. Pulmonary:      Effort: Pulmonary effort is normal.      Breath sounds: Normal breath sounds. No wheezing. Musculoskeletal:      Right lower leg: Edema present. Left lower leg: Edema present. Comments: Trace nonpitting right lower lower extremity edema. +1 nonpitting left lower extremity edema   Skin:     Comments: Area of hyperpigmentation to the medial aspect of the right ankle. Skin of the bilateral lower extremities is dry and flaky. Neurological:      Mental Status: She is alert and oriented to person, place, and time. Psychiatric:         Mood and Affect: Mood normal.         Behavior: Behavior normal.         Thought Content: Thought content normal.         Judgment: Judgment normal.           On this date 02/19/21 I have spent 20 minutes reviewing previous notes, test results and face to face with the patient discussing the diagnosis and importance of compliance with the treatment plan as well as documenting on the day of the visit. An electronic signature was used to authenticate this note.     --EM Jyoce - CNP

## 2021-03-23 ENCOUNTER — IMMUNIZATION (OUTPATIENT)
Dept: PRIMARY CARE CLINIC | Age: 71
End: 2021-03-23
Payer: COMMERCIAL

## 2021-03-23 PROCEDURE — 0012A COVID-19, MODERNA VACCINE 100MCG/0.5ML DOSE: CPT | Performed by: FAMILY MEDICINE

## 2021-03-23 PROCEDURE — 91301 COVID-19, MODERNA VACCINE 100MCG/0.5ML DOSE: CPT | Performed by: FAMILY MEDICINE

## 2021-04-26 DIAGNOSIS — I10 ESSENTIAL HYPERTENSION: ICD-10-CM

## 2021-04-26 DIAGNOSIS — M34.1 CREST SYNDROME (HCC): ICD-10-CM

## 2021-04-26 DIAGNOSIS — R06.02 SOB (SHORTNESS OF BREATH): ICD-10-CM

## 2021-04-26 DIAGNOSIS — I73.00 RAYNAUD'S DISEASE WITHOUT GANGRENE: ICD-10-CM

## 2021-04-26 DIAGNOSIS — E78.00 PURE HYPERCHOLESTEROLEMIA: ICD-10-CM

## 2021-04-26 DIAGNOSIS — I27.21 PAH (PULMONARY ARTERY HYPERTENSION) (HCC): ICD-10-CM

## 2021-04-28 RX ORDER — VALSARTAN AND HYDROCHLOROTHIAZIDE 160; 25 MG/1; MG/1
TABLET ORAL
Qty: 90 TABLET | Refills: 3 | Status: SHIPPED | OUTPATIENT
Start: 2021-04-28 | End: 2022-03-08

## 2021-04-28 RX ORDER — ATORVASTATIN CALCIUM 10 MG/1
TABLET, FILM COATED ORAL
Qty: 90 TABLET | Refills: 3 | Status: SHIPPED | OUTPATIENT
Start: 2021-04-28 | End: 2022-03-08

## 2021-06-10 NOTE — PROGRESS NOTES
[facility]   Advanced Heart Failure/Pulmonary Hypertension  Cardiac Evaluation      Ryland Saini  YOB: 1950    Date of Visit:  6/11/21  Chief Complaint   Patient presents with    Congestive Heart Failure    Shortness of Breath      History of Present Illness:   Ryland Saini has a history of PAH related to CREST syndrome. She was started on Letairis 5mg in Dec after a [address] on 11/29/12. She stopped Letaris due to swelling and does not want to take another medication in it's place. She does suffer from Raynaud's and has cold fingers but no wounds. Sees Dr. Liza Hendricks regularly. Today, she is here for follow up for CREST. She had an Echo 12/4/20 which showed EF 55-60% and estimated pulmonary artery systolic pressure is mildly to moderately elevated but unable to measure exact PASP due to incomplete TR envelope. She states nothing is new with her CREST. She does notice that her swallowing is effected and aggravating at times. She denies issues with exercise tolerance. She had heart pounding hard at night. Her HR is up on her Fit Bit to over 100 resting in bed [place] once in a while. \"   Discussed 6 minute vergara walk.        NYHA:  II    No Known Allergies    Current Outpatient Medications:     atorvastatin (LIPITOR) 10 MG tablet, TAKE 1 TABLET DAILY, Disp: 90 tablet, Rfl: 3    valsartan-hydroCHLOROthiazide (DIOVAN-HCT) 160-25 MG per tablet, TAKE 1 TABLET DAILY, Disp: 90 tablet, Rfl: 3    amLODIPine (NORVASC) 5 MG tablet, TAKE 1 TABLET DAILY, Disp: 90 tablet, Rfl: 3    aspirin 81 MG EC tablet, Take 1 tablet by mouth daily (Patient taking differently: Take 40.5 mg by mouth daily ), Disp: 90 tablet, Rfl: 3    vitamin E 400 UNIT capsule, Take 400 Units by mouth daily, Disp: , Rfl:     Cholecalciferol (VITAMIN D3) 1000 UNITS CAPS, Take 1 capsule by mouth daily , Disp: 30 capsule, Rfl:     Garlic 3813 MG TABS, Take 1 tablet by mouth daily , Disp: , Rfl:     Coenzyme Q10 (CO Q-10) 100 MG CAPS, Take 1 capsule by mouth daily , Disp: , Rfl:     Cinnamon 500 MG TABS, Take 1 tablet by mouth daily , Disp: , Rfl:     calcium carbonate 600 MG TABS tablet, Take 1 tablet by mouth daily. , Disp: , Rfl:     Ginkgo Biloba 40 MG TABS, Take 40 mg by mouth daily. , Disp: , Rfl:     Multiple Vitamin (MULTIVITAMIN PO), Take 1 tablet by mouth daily , Disp: , Rfl:     Pyridoxine HCl (VITAMIN B-6 PO), Take 1 tablet by mouth daily , Disp: , Rfl:     fish oil-omega-3 fatty acids 1000 MG capsule, Take 1 g by mouth daily , Disp: , Rfl:     vitamin B-12 (CYANOCOBALAMIN) 100 MCG tablet, Take 100 mcg by mouth daily. , Disp: , Rfl:     Flaxseed, Linseed, (FLAX SEED OIL) 1000 MG CAPS, Take 1,000 mg by mouth daily. , Disp: , Rfl:     Ascorbic Acid (VITAMIN C) 500 MG tablet, Take 500 mg by mouth daily.   , Disp: , Rfl:     furosemide (LASIX) 20 MG tablet, Take 1 tablet by mouth daily as needed (swelling) (Patient not taking: Reported on 6/11/2021), Disp: 30 tablet, Rfl: 0      Immunization History   Administered Date(s) Administered    COVID-19, Moderna, PF, 100mcg/0.5mL 03/23/2021    Influenza 11/30/2015    Influenza Virus Vaccine 10/15/2014    Influenza Whole 09/23/2009    Influenza, High Dose (Fluzone 65 yrs and older) 11/30/2015, 12/12/2016, 10/30/2018, 11/05/2019    Influenza, High-dose, Quadv, 65 yrs +, IM (Fluzone) 10/29/2020    Pneumococcal Conjugate 13-valent ([id number]) 08/13/2015    Pneumococcal Polysaccharide ([id number]) 04/26/2013, 12/12/2016    Tdap (Boostrix, Adacel) 04/26/2013    Zoster Live (Zostavax) 06/11/2013     Patient Active Problem List   Diagnosis    Pure hypercholesterolemia    Raynauds syndrome    GERD (gastroesophageal reflux disease)    Hypertension    [facility] ([facility]    PAH (pulmonary artery hypertension) ([facility]    Vitamin D deficiency    Osteopenia    Sigmoid diverticulosis    Adenomatous polyp     Past Medical History:   Diagnosis Date    Adenomatous polyp 7/17/2015    negative for high grade dysplasiaManegold    CREST syndrome ([address]     Hypertension     Osteopenia 7/22/10    LS -1.15    Pure hypercholesterolemia     Raynauds syndrome     [place]    Sigmoid diverticulosis 7/17/2015    [place]    Vitamin D deficiency      Past Surgical History:   Procedure Laterality Date    BREAST BIOPSY  2008    right breast    CARDIAC CATHETERIZATION  11/27/12    Right Heart Cath    CATARACT REMOVAL Bilateral 06/2020    COLONOSCOPY N/A 6/25/2020    COLONOSCOPY POLYPECTOMY SNARE/COLD BIOPSY performed by Rebekah Gracia MD at [address] [place] [address] Left 08/2019     Family History   Problem Relation Age of Onset    Heart Disease Mother 80        coronary stents    Cancer Father 46        bladder cancer, he was a smoker    High Cholesterol Sister     No Known Problems Brother      Social History     Socioeconomic History    Marital status:      Spouse name: Roma Bustillos Number of children: 2    Years of education: 15    Highest education level: High school graduate   Occupational History    Not on file   Tobacco Use    Smoking status: Never Smoker    Smokeless tobacco: Never Used    Tobacco comment: avoid tobacco   Vaping Use    Vaping Use: Never used   Substance and Sexual Activity    Alcohol use: Yes     Comment: Has an occasional drink on holidays.  Drug use: No    Sexual activity: Yes     Partners: Male     Comment:    Other Topics Concern    Not on file   Social History Narrative    Not on file     Social Determinants of Health     Financial Resource Strain: Low Risk     Difficulty of Paying Living Expenses: Not hard at all   Food Insecurity: No Food Insecurity    Worried About Running Out of Food in the Last Year: Never true    [address] in the Last Year: Never true   Transportation Needs: No Transportation Needs    Lack of Transportation (Medical):  No    Lack of Transportation (Non-Medical): No   Physical Activity:     Days of Exercise per Week:     Minutes of Exercise per Session:    Stress:     Feeling of Stress :    Social Connections:     Frequency of Communication with Friends and Family:     Frequency of Social Gatherings with Friends and Family:     Attends [employer] Services:     Active Member of Clubs or Organizations:     Attends Club or Organization Meetings:     Marital Status:    Intimate Partner Violence:     Fear of Current or Ex-Partner:     Emotionally Abused:     Physically Abused:     Sexually Abused:      Review of Systems:   · Constitutional: there has been no unanticipated weight loss. There's been no change in energy level, sleep pattern, or activity level. · Eyes: No visual changes or diplopia. No scleral icterus. · ENT: No Headaches, hearing loss or vertigo. No mouth sores or sore throat. · Cardiovascular: Reviewed in HPI  · Respiratory: No cough or wheezing, no sputum production. No hematemesis. · Gastrointestinal: No abdominal pain, appetite loss, blood in stools. No change in bowel or bladder habits. · Genitourinary: No dysuria, trouble voiding, or hematuria. · Musculoskeletal:  No gait disturbance, weakness or joint complaints. · Integumentary: No rash or pruritis. · Neurological: No headache, diplopia, change in muscle strength, numbness or tingling. No change in gait, balance, coordination, mood, affect, memory, mentation, behavior. · Psychiatric: No anxiety, no depression. · Endocrine: No malaise, fatigue or temperature intolerance. No excessive thirst, fluid intake, or urination. No tremor. · Hematologic/Lymphatic: No abnormal bruising or bleeding, blood clots or swollen lymph nodes. · Allergic/Immunologic: No nasal congestion or hives. Physical Examination:    Vitals:    06/11/21 1026   BP: 116/62   Pulse: 77   SpO2: 98%   Weight: 188 lb (85.3 kg)   Height: 5' 4\" (1.626 m)   Stable.     Wt Readings from Last 3 Encounters:   06/11/21 188 lb (85.3 kg)   02/19/21 191 lb 12.8 oz (87 kg)   12/04/20 186 lb (84.4 kg)     BP Readings from Last 3 Encounters:   06/11/21 116/62   02/19/21 122/74   12/04/20 132/80       Constitutional and General Appearance: Warm and dry, no apparent distress, normal coloration  HEENT:  Normocephalic, atraumatic  Respiratory:  · Normal excursion and expansion without use of accessory muscles  · Resp Auscultation: Normal breath sounds without dullness  Cardiovascular:  · The apical impulses not displaced  · Heart tones are crisp and normal  · JVP ~ 8 cm H2O.  · Regular rate and rhythm, normal V0M1, 2/6 systolic murmur at USB, no g/r/c  · Peripheral pulses are symmetrical and full  · There is no clubbing, cyanosis of the extremities.   · No edema in ankles  · Pedal Pulses: 2+ and equal   Abdomen:  · No masses or tenderness  · Liver/Spleen: No Abnormalities Noted  Neurological/Psychiatric:  · Alert and oriented in all spheres  · Moves all extremities well  · Exhibits normal gait balance and coordination  · No abnormalities of mood, affect, memory, mentation, or behavior are noted    Lab Data:  CBC:   Lab Results   Component Value Date    WBC 4.8 12/15/2020    WBC 5.5 06/03/2020    WBC 5.5 12/05/2019    RBC 4.32 12/15/2020    RBC 4.51 06/03/2020    RBC 4.38 12/05/2019    HGB 12.6 12/15/2020    HGB 13.2 06/03/2020    HGB 12.8 12/05/2019    HCT 38.0 12/15/2020    HCT 40.0 06/03/2020    HCT 38.6 12/05/2019    MCV 87.9 12/15/2020    MCV 88.7 06/03/2020    MCV 88.1 12/05/2019    RDW 14.3 12/15/2020    RDW 13.7 06/03/2020    RDW 13.7 12/05/2019     12/15/2020     06/03/2020     12/05/2019     BMP:   Lab Results   Component Value Date     12/15/2020     06/03/2020     12/05/2019    K 4.4 12/15/2020    K 4.5 06/03/2020    K 3.9 12/05/2019     12/15/2020     06/03/2020     12/05/2019    CO2 29 12/15/2020    CO2 26 06/03/2020    CO2 27 12/05/2019    PHOS 3.9 11/29/2012    BUN 16 12/15/2020    BUN 17 2020    BUN 14 2019    CREATININE 0.7 12/15/2020    CREATININE 0.8 2020    CREATININE 0.7 2019     BNP:   Lab Results   Component Value Date    BNP 40 2014    BNP 28 2012    BNP 52 08/10/2012     FASTING LIPID PANEL:  Lab Results   Component Value Date    CHOL 140 12/15/2020    HDL 56 12/15/2020    HDL 65 2012    TRIG 91 12/15/2020     Testin Minute [place] Walk  6/15/21  Pre Walk:  124/62,  HR 68,  Saturation 98%  Immediate walk:  158/78 HR 76, Saturation 100%  5 Minute post Walk: 138/80 HR 77, Saturation 100%    Walked 6 minutes, no rests. 402.6 meters walked. 474 meters on [place] Walk 2017    Echo 2020  -Overall left ventricular function is normal.   -Ejection fraction is visually estimated to be 55-60 %. E/e'= 8.4   -No regional wall motion abnormalities are noted. -Grade I diastolic dysfunction with normal LV filling pressures. -Mild tricuspid regurgitation. Estimated pulmonary artery systolic pressure is mildly to moderately   elevated but unable to measure exact PASP due to incomplete TR envelope. Echo  19  Summary   *Left ventricle - normal size, thickness and function with EF of 60%   *Mitral valve - mild regurgitation   *Tricuspid valve - mild regurgitation with PASP of 31mmHg    Echo 12/10/2018  Normal left ventricle size, wall thickness and systolic function with an  estimated ejection fraction of 60%. No regional wall motion abnormalities  are seen. E/e\"= 60.7  Normal diastolic function. Mild mitral regurgitation is present. There is mild-moderate tricuspid regurgitation with RVSP estimated at 30  mmHg    Echo 2017    Normal left ventricle size, wall thickness and systolic function with an   estimated ejection fraction of 60%.  No regional wall motion abnormalities   are seen.   E/e\"= 10. Normal diastolic function.   Mild mitral regurgitation.   Mild-moderate tricuspid regurgitation with RVSP estimated at 34 mmHg.   Trivial pulmonic regurgitation.       Echo 12/2/2016  RVSP 34 mm Hg essentially unchanged from 8/2015. Echo 8/2015  Normal left ventricle size, wall thickness and systolic function with an estimated ejection fraction of 55%. -No regional wall motion abnormalities  are seen. Mild mitral regurgitation is present. There is mild-moderate tricuspid regurgitation with RVSP estimated at 35 mmHG    [facility] 11/29/12 - RA: 7  V: 43/8  PA: 42/14, mean 25  PCWP: 10  Thermodilution CO: 6.47 Thermodilution CI: 3.42   Jaki CO: 5.51  Jaki CI: 2.92  PA Sat: 72.5%  PVR: 161 dynes      Labs were reviewed including labs from other hospital systems through [facility]. Cardiac testing was reviewed including echos, nuclear scans, cardiac catheterization, including from other hospital systems through [facility]. Assessment:  1. PAH (pulmonary artery hypertension) ([address]    2. Pure hypercholesterolemia    3. Essential hypertension    4. CREST syndrome    5. Raynaud's disease without gangrene    6. SOB (shortness of breath)    7. Vitamin D deficiency        1. Pulmonary artery hypertension: Stable. ProBNP 12/15/20> 147  ProBNP 6/3/20> 186    ECHO 8/28/2015> RVSP 35 mmHg. ECHO 12/16> EF 55%, RVSP 34mmHg. ECHO 12/2/19> EF 60% and RVSP 31mmHg. ECHO 12/4/2020> EF 65%     2. Essential hypertension:   ~ /62   Pulse 77   Ht 5' 4\" (1.626 m)   Wt 188 lb (85.3 kg)   SpO2 98%   BMI 32.27 kg/m²   ~ Controlled. 3. CREST syndrome: Per Dr. Rosendo Serna. -[facility] 11/26/2012> recommended starting Endothelin antagonist     4. Vitamin D deficiency:   Stable. Vit D 12/15/20> 40.8  Vit D 6/3/20> 40. 4  Vit D 12/5/19> 28.0      5.      Raynaud's disease without gangrene:  She experiences both Red and white colored hands. These episodes are more frequent in the Winter. Plan:    1.  6 Minute [place] Walk next week. Prerna will call you to schedule. 2.  Labs FASTING soon.   3.  Echo in 6 months with office visit at [facility]. 4.  Continue same medications. Scribe's attestation: This note was scribed in the presence of Gerson España M.D. by Melissa Estrada RN     The scribe's documentation has been prepared under my direction and personally reviewed by me in its entirety. I confirm that the note above accurately reflects all work, treatment, procedures, and medical decision making performed by me. Time Based Itemization  A total of 40 minutes was spent on today's patient encounter. If applicable, non-patient-facing activities:  ( x)Preparing to see the patient and reviewing records  (x ) Individual interpretation of results  ( ) Discussion or coordination of care with other health care professionals  ( x) Ordering of unique tests, medications, or procedures  ( x) Documentation within the EHR    I appreciate the opportunity of cooperating in the care of this individual.    Leonardo Keith MD.

## 2021-06-11 ENCOUNTER — OFFICE VISIT (OUTPATIENT)
Dept: CARDIOLOGY CLINIC | Age: 71
End: 2021-06-11
Payer: COMMERCIAL

## 2021-06-11 ENCOUNTER — HOSPITAL ENCOUNTER (OUTPATIENT)
Age: 71
Discharge: HOME | End: 2021-06-11
Payer: COMMERCIAL

## 2021-06-11 VITALS
SYSTOLIC BLOOD PRESSURE: 116 MMHG | DIASTOLIC BLOOD PRESSURE: 62 MMHG | BODY MASS INDEX: 32.1 KG/M2 | OXYGEN SATURATION: 98 % | WEIGHT: 188 LBS | HEART RATE: 77 BPM | HEIGHT: 64 IN

## 2021-06-11 DIAGNOSIS — E55.9 VITAMIN D DEFICIENCY: ICD-10-CM

## 2021-06-11 DIAGNOSIS — E78.00 PURE HYPERCHOLESTEROLEMIA: ICD-10-CM

## 2021-06-11 DIAGNOSIS — R06.02 SOB (SHORTNESS OF BREATH): ICD-10-CM

## 2021-06-11 DIAGNOSIS — M34.1 CREST SYNDROME (HCC): ICD-10-CM

## 2021-06-11 DIAGNOSIS — I10 ESSENTIAL HYPERTENSION: ICD-10-CM

## 2021-06-11 DIAGNOSIS — I27.21 PAH (PULMONARY ARTERY HYPERTENSION) (HCC): Primary | ICD-10-CM

## 2021-06-11 DIAGNOSIS — I73.00 RAYNAUD'S DISEASE WITHOUT GANGRENE: ICD-10-CM

## 2021-06-11 DIAGNOSIS — I27.21 PAH (PULMONARY ARTERY HYPERTENSION) (HCC): ICD-10-CM

## 2021-06-11 LAB
A/G RATIO: 1.7 (ref 1.1–2.2)
ALBUMIN SERPL-MCNC: 4.5 G/DL (ref 3.4–5)
ALP BLD-CCNC: 73 U/L (ref 40–129)
ALT SERPL-CCNC: 15 U/L (ref 10–40)
ANION GAP SERPL CALCULATED.3IONS-SCNC: 12 MMOL/L (ref 3–16)
AST SERPL-CCNC: 19 U/L (ref 15–37)
BILIRUB SERPL-MCNC: 0.4 MG/DL (ref 0–1)
BUN BLDV-MCNC: 14 MG/DL (ref 7–20)
CALCIUM SERPL-MCNC: 9.8 MG/DL (ref 8.3–10.6)
CHLORIDE BLD-SCNC: 105 MMOL/L (ref 99–110)
CHOLESTEROL, TOTAL: 153 MG/DL (ref 0–199)
CO2: 27 MMOL/L (ref 21–32)
CREAT SERPL-MCNC: 0.8 MG/DL (ref 0.6–1.2)
GFR AFRICAN AMERICAN: >60
GFR NON-AFRICAN AMERICAN: >60
GLOBULIN: 2.7 G/DL
GLUCOSE BLD-MCNC: 97 MG/DL (ref 70–99)
HCT VFR BLD CALC: 37.8 % (ref 36–48)
HDLC SERPL-MCNC: 56 MG/DL (ref 40–60)
HEMOGLOBIN: 13 G/DL (ref 12–16)
LDL CHOLESTEROL CALCULATED: 73 MG/DL
MCH RBC QN AUTO: 30 PG (ref 26–34)
MCHC RBC AUTO-ENTMCNC: 34.3 G/DL (ref 31–36)
MCV RBC AUTO: 87.6 FL (ref 80–100)
PDW BLD-RTO: 13.9 % (ref 12.4–15.4)
PLATELET # BLD: 146 K/UL (ref 135–450)
PMV BLD AUTO: 9.4 FL (ref 5–10.5)
POTASSIUM SERPL-SCNC: 4.5 MMOL/L (ref 3.5–5.1)
PRO-BNP: 121 PG/ML (ref 0–124)
RBC # BLD: 4.31 M/UL (ref 4–5.2)
SODIUM BLD-SCNC: 144 MMOL/L (ref 136–145)
TOTAL PROTEIN: 7.2 G/DL (ref 6.4–8.2)
TRIGL SERPL-MCNC: 121 MG/DL (ref 0–150)
VITAMIN D 25-HYDROXY: 42.2 NG/ML
VLDLC SERPL CALC-MCNC: 24 MG/DL
WBC # BLD: 5.7 K/UL (ref 4–11)

## 2021-06-11 PROCEDURE — 82306 VITAMIN D 25 HYDROXY: CPT

## 2021-06-11 PROCEDURE — 36415 COLL VENOUS BLD VENIPUNCTURE: CPT

## 2021-06-11 PROCEDURE — 80053 COMPREHEN METABOLIC PANEL: CPT

## 2021-06-11 PROCEDURE — 80061 LIPID PANEL: CPT

## 2021-06-11 PROCEDURE — 83880 ASSAY OF NATRIURETIC PEPTIDE: CPT

## 2021-06-11 PROCEDURE — 99215 OFFICE O/P EST HI 40 MIN: CPT | Performed by: INTERNAL MEDICINE

## 2021-06-11 PROCEDURE — 85027 COMPLETE CBC AUTOMATED: CPT

## 2021-06-11 NOTE — PATIENT INSTRUCTIONS
Plan:    1.  6 Minute [place] Walk next week. Prerna will call you to schedule. 2.  Labs FASTING soon. 3.  Echo in 6 months with office visit at [id number]. 4.  Continue same medications.

## 2021-06-23 ENCOUNTER — OFFICE VISIT (OUTPATIENT)
Dept: RHEUMATOLOGY | Age: 71
End: 2021-06-23
Payer: COMMERCIAL

## 2021-06-23 VITALS
SYSTOLIC BLOOD PRESSURE: 128 MMHG | WEIGHT: 189 LBS | HEART RATE: 76 BPM | DIASTOLIC BLOOD PRESSURE: 76 MMHG | HEIGHT: 64 IN | BODY MASS INDEX: 32.27 KG/M2

## 2021-06-23 DIAGNOSIS — M34.1 CREST SYNDROME (HCC): Primary | ICD-10-CM

## 2021-06-23 PROCEDURE — 99213 OFFICE O/P EST LOW 20 MIN: CPT | Performed by: INTERNAL MEDICINE

## 2021-08-18 ENCOUNTER — HOSPITAL ENCOUNTER (OUTPATIENT)
Dept: PULMONOLOGY | Age: 71
Discharge: HOME | End: 2021-08-18
Payer: COMMERCIAL

## 2021-08-18 ENCOUNTER — HOSPITAL ENCOUNTER (OUTPATIENT)
Dept: WOMENS IMAGING | Age: 71
Discharge: HOME | End: 2021-08-18
Payer: COMMERCIAL

## 2021-08-18 VITALS — OXYGEN SATURATION: 100 % | RESPIRATION RATE: 16 BRPM | HEART RATE: 73 BPM

## 2021-08-18 DIAGNOSIS — Z12.31 BREAST CANCER SCREENING BY MAMMOGRAM: ICD-10-CM

## 2021-08-18 DIAGNOSIS — M34.1 CREST SYNDROME (HCC): ICD-10-CM

## 2021-08-18 LAB
DLCO %PRED: 76 %
DLCO PRED: NORMAL
DLCO/VA %PRED: NORMAL
DLCO/VA PRED: NORMAL
DLCO/VA: NORMAL
DLCO: NORMAL
EXPIRATORY TIME: NORMAL
FEF 25-75% %PRED-PRE: NORMAL
FEF 25-75% PRED: NORMAL
FEF 25-75%-PRE: NORMAL
FEV1 %PRED-PRE: 89 %
FEV1 PRED: NORMAL
FEV1/FVC %PRED-PRE: NORMAL
FEV1/FVC PRED: NORMAL
FEV1/FVC: 104 %
FEV1: NORMAL
FVC %PRED-PRE: NORMAL
FVC PRED: NORMAL
FVC: NORMAL
GAW %PRED: NORMAL
GAW PRED: NORMAL
GAW: NORMAL
IC %PRED: NORMAL
IC PRED: NORMAL
IC: NORMAL
MVV %PRED-PRE: NORMAL
MVV PRED: NORMAL
MVV-PRE: NORMAL
PEF %PRED-PRE: NORMAL
PEF PRED: NORMAL
PEF-PRE: NORMAL
RAW %PRED: NORMAL
RAW PRED: NORMAL
RAW: NORMAL
RV %PRED: NORMAL
RV PRED: NORMAL
RV: NORMAL
SVC %PRED: NORMAL
SVC PRED: NORMAL
SVC: NORMAL
TLC %PRED: 103 %
TLC PRED: NORMAL
TLC: NORMAL
VA %PRED: NORMAL
VA PRED: NORMAL
VA: NORMAL
VTG %PRED: NORMAL
VTG PRED: NORMAL
VTG: NORMAL

## 2021-08-18 PROCEDURE — 94200 LUNG FUNCTION TEST (MBC/MVV): CPT

## 2021-08-18 PROCEDURE — 77063 BREAST TOMOSYNTHESIS BI: CPT

## 2021-08-18 PROCEDURE — 94010 BREATHING CAPACITY TEST: CPT

## 2021-08-18 PROCEDURE — 94760 N-INVAS EAR/PLS OXIMETRY 1: CPT

## 2021-08-18 PROCEDURE — 94726 PLETHYSMOGRAPHY LUNG VOLUMES: CPT

## 2021-08-18 PROCEDURE — 94729 DIFFUSING CAPACITY: CPT

## 2021-08-18 ASSESSMENT — PULMONARY FUNCTION TESTS
FEV1_PERCENT_PREDICTED_PRE: 89
FEV1/FVC: 104

## 2021-08-19 NOTE — PROCEDURES
Pulmonary Function Testing      Patient name:  Mehreen García     [facility] Unit #:   [id number]   Date of test: 8/18/2021  Date of interpretation:   8/19/2021    Ms. Mehreen García is a 65y.o. year-old non smoker. The spirometry data were acceptable and reproducible. Spirometry:  Flow volume loops were normal. The FEV-1/FVC ratio was normal. The   FEV-1 was 1.98 liters (89% of predicted), which was normal. The FVC was 2.54 liters (86% of predicted), which was normal. Response to inhaled bronchodilators (albuterol) was not performed. Lung volumes:  Lung volumes were tested by plethysmography. The total lung capacity was 4.96 liters (103% of predicted), which was normal. The residual volume was 2.37 liters (121% of predicted), which was increased. The ratio of residual volume to total lung capacity (RV/TLC) was 117%, which was normal. Specific airway resistance was increased. Diffusion capacity was found to be decreased. Interpretation:  Possible mild obstructive airway disease with evidence of air trapping and reduced diffusion capacity.

## 2021-12-06 ENCOUNTER — HOSPITAL ENCOUNTER (OUTPATIENT)
Dept: NON INVASIVE DIAGNOSTICS | Age: 71
Discharge: HOME | End: 2021-12-06
Payer: COMMERCIAL

## 2021-12-06 DIAGNOSIS — I73.00 RAYNAUD'S DISEASE WITHOUT GANGRENE: ICD-10-CM

## 2021-12-06 DIAGNOSIS — I10 ESSENTIAL HYPERTENSION: ICD-10-CM

## 2021-12-06 DIAGNOSIS — M34.1 CREST SYNDROME (HCC): ICD-10-CM

## 2021-12-06 DIAGNOSIS — I27.21 PAH (PULMONARY ARTERY HYPERTENSION) (HCC): ICD-10-CM

## 2021-12-06 DIAGNOSIS — R06.02 SOB (SHORTNESS OF BREATH): ICD-10-CM

## 2021-12-06 LAB
LV EF: 55 %
LVEF MODALITY: NORMAL

## 2021-12-06 PROCEDURE — 93306 TTE W/DOPPLER COMPLETE: CPT

## 2021-12-08 ENCOUNTER — TELEPHONE (OUTPATIENT)
Dept: CARDIOLOGY CLINIC | Age: 71
End: 2021-12-08

## 2021-12-15 ENCOUNTER — OFFICE VISIT (OUTPATIENT)
Dept: RHEUMATOLOGY | Age: 71
End: 2021-12-15
Payer: COMMERCIAL

## 2021-12-15 VITALS
HEART RATE: 80 BPM | SYSTOLIC BLOOD PRESSURE: 132 MMHG | DIASTOLIC BLOOD PRESSURE: 80 MMHG | HEIGHT: 64 IN | BODY MASS INDEX: 31.41 KG/M2 | WEIGHT: 184 LBS

## 2021-12-15 DIAGNOSIS — M34.1 CREST SYNDROME (HCC): Primary | ICD-10-CM

## 2021-12-15 PROCEDURE — 99213 OFFICE O/P EST LOW 20 MIN: CPT | Performed by: INTERNAL MEDICINE

## 2021-12-22 DIAGNOSIS — M34.1 CREST SYNDROME (HCC): ICD-10-CM

## 2021-12-22 DIAGNOSIS — I10 ESSENTIAL HYPERTENSION: ICD-10-CM

## 2021-12-22 DIAGNOSIS — I73.00 RAYNAUD'S DISEASE WITHOUT GANGRENE: ICD-10-CM

## 2021-12-22 DIAGNOSIS — I27.21 PAH (PULMONARY ARTERY HYPERTENSION) (HCC): ICD-10-CM

## 2021-12-22 DIAGNOSIS — R06.02 SOB (SHORTNESS OF BREATH): ICD-10-CM

## 2021-12-22 DIAGNOSIS — E78.00 PURE HYPERCHOLESTEROLEMIA: ICD-10-CM

## 2021-12-23 RX ORDER — AMLODIPINE BESYLATE 5 MG/1
TABLET ORAL
Qty: 90 TABLET | Refills: 3 | Status: SHIPPED | OUTPATIENT
Start: 2021-12-23 | End: 2022-10-21

## 2022-01-07 NOTE — PROGRESS NOTES
[address]   Advanced Heart Failure/Pulmonary Hypertension  Cardiac Evaluation      Zaheer Sims  YOB: 1950    Date of Visit:  1/10/22  Chief Complaint   Patient presents with    Shortness of Breath      History of Present Illness:   Zaheer Sims has a history of PAH related to CREST syndrome. She was started on Letairis 5mg in Dec after a [address] on 11/29/12. She stopped Letaris due to swelling and does not want to take another medication in it's place. She does suffer from Raynaud's and has cold fingers but no wounds. Sees Dr. Charles Mireles regularly. She had an Echo 12/4/20 which showed EF 55-60% and estimated pulmonary artery systolic pressure is mildly to moderately elevated but unable to measure exact PASP due to incomplete TR envelope. She does notice that her swallowing is effected and aggravating at times. She denies issues with exercise tolerance. She had heart pounding hard at night. Her HR is up on her Fit Bit to over 100 resting in bed [place] once in a while. \"         Today she is here for follow up for CREST. Her echo 12/2021 showed Normal right ventricular size and function and EF 55%. She continues to follow with Dr. Charles Mireles. She is fully vaccinated and boosted. No SOB, swelling in her ankles or feeling like she is slowing down. Overall, she feels good.           NYHA:  II    No Known Allergies    Current Outpatient Medications:     amLODIPine (NORVASC) 5 MG tablet, TAKE 1 TABLET DAILY, Disp: 90 tablet, Rfl: 3    atorvastatin (LIPITOR) 10 MG tablet, TAKE 1 TABLET DAILY, Disp: 90 tablet, Rfl: 3    valsartan-hydroCHLOROthiazide (DIOVAN-HCT) 160-25 MG per tablet, TAKE 1 TABLET DAILY, Disp: 90 tablet, Rfl: 3    furosemide (LASIX) 20 MG tablet, Take 1 tablet by mouth daily as needed (swelling), Disp: 30 tablet, Rfl: 0    aspirin 81 MG EC tablet, Take 1 tablet by mouth daily (Patient taking differently: Take 40.5 mg by mouth daily ), Disp: 90 tablet, Rfl: 3    vitamin E 400 UNIT capsule, Take 400 Units by mouth daily, Disp: , Rfl:     Cholecalciferol (VITAMIN D3) 1000 UNITS CAPS, Take 1 capsule by mouth daily , Disp: 30 capsule, Rfl:     Garlic 9846 MG TABS, Take 1 tablet by mouth daily , Disp: , Rfl:     Coenzyme Q10 (CO Q-10) 100 MG CAPS, Take 1 capsule by mouth daily , Disp: , Rfl:     Cinnamon 500 MG TABS, Take 1 tablet by mouth daily , Disp: , Rfl:     calcium carbonate 600 MG TABS tablet, Take 1 tablet by mouth daily. , Disp: , Rfl:     Ginkgo Biloba 40 MG TABS, Take 40 mg by mouth daily. , Disp: , Rfl:     Multiple Vitamin (MULTIVITAMIN PO), Take 1 tablet by mouth daily , Disp: , Rfl:     Pyridoxine HCl (VITAMIN B-6 PO), Take 1 tablet by mouth daily , Disp: , Rfl:     fish oil-omega-3 fatty acids 1000 MG capsule, Take 1 g by mouth daily , Disp: , Rfl:     vitamin B-12 (CYANOCOBALAMIN) 100 MCG tablet, Take 100 mcg by mouth daily. , Disp: , Rfl:     Flaxseed, Linseed, (FLAX SEED OIL) 1000 MG CAPS, Take 1,000 mg by mouth daily. , Disp: , Rfl:     Ascorbic Acid (VITAMIN C) 500 MG tablet, Take 500 mg by mouth daily.   , Disp: , Rfl:       Immunization History   Administered Date(s) Administered    COVID-19, Durga Amin, Primary or Immunocompromised, PF, 100mcg/0.5mL 03/23/2021    Influenza 11/30/2015    Influenza Virus Vaccine 10/15/2014    Influenza Whole 09/23/2009    Influenza, High Dose (Fluzone 65 yrs and older) 11/30/2015, 12/12/2016, 10/30/2018, 11/05/2019    Influenza, High-dose, Quadv, 65 yrs +, IM (Fluzone) 10/29/2020    Pneumococcal Conjugate 13-valent ([id number]) 08/13/2015    Pneumococcal Polysaccharide ([id number]) 04/26/2013, 12/12/2016    Tdap (Boostrix, Adacel) 04/26/2013    Zoster Live (Zostavax) 06/11/2013     Patient Active Problem List   Diagnosis    Pure hypercholesterolemia    Raynauds syndrome    GERD (gastroesophageal reflux disease)    Hypertension    [facility]    PA[facility]pulmonary artery hypertension) [facility]    Vitamin D deficiency    Osteopenia    Sigmoid diverticulosis    Adenomatous polyp     Past Medical History:   Diagnosis Date    Adenomatous polyp 7/17/2015    negative for high grade dysplasiaManegold    CREST syndrome ([facility] [address] [facility]     Hypertension     Osteopenia 7/22/10    LS -1.15    Pure hypercholesterolemia     Raynauds syndrome     [place]    Sigmoid diverticulosis 7/17/2015    Manegold    Vitamin D deficiency      Past Surgical History:   Procedure Laterality Date    BREAST BIOPSY  2008    right breast    CARDIAC CATHETERIZATION  11/27/12    Right Heart Cath    CATARACT REMOVAL Bilateral 06/2020    COLONOSCOPY N/A 6/25/2020    COLONOSCOPY POLYPECTOMY SNARE/COLD BIOPSY performed by Марина Mann MD at [address] [place] [address] 08/2019     Family History   Problem Relation Age of Onset    Heart Disease Mother 80        coronary stents    Cancer Father 46        bladder cancer, he was a smoker    High Cholesterol Sister     No Known Problems Brother      Social History     Socioeconomic History    Marital status:      Spouse name: Stef Andrade Number of children: 2    Years of education: 15    Highest education level: High school graduate   Occupational History    Not on file   Tobacco Use    Smoking status: Never Smoker    Smokeless tobacco: Never Used    Tobacco comment: avoid tobacco   Vaping Use    Vaping Use: Never used   Substance and Sexual Activity    Alcohol use: Yes     Comment: Has an occasional drink on holidays.      Drug use: No    Sexual activity: Yes     Partners: Male     Comment:    Other Topics Concern    Not on file   Social History Narrative    Not on file     Social Determinants of Health     Financial Resource Strain:     Difficulty of Paying Living Expenses: Not on file   Food Insecurity:     Worried About Running Out of Food in the Last Year: Not on file    Isaiah of Food in the Last Year: Not on SARAH Koehler Needs:     Lack of Transportation (Medical): Not on file    Lack of Transportation (Non-Medical): Not on file   Physical Activity:     Days of Exercise per Week: Not on file    Minutes of Exercise per Session: Not on file   Stress:     Feeling of Stress : Not on file   Social Connections:     Frequency of Communication with Friends and Family: Not on file    Frequency of Social Gatherings with Friends and Family: Not on file    Attends Sikhism Services: Not on file    Active Member of [address] [place] [address] or Organizations: Not on file    Attends Club or Organization Meetings: Not on file    Marital Status: Not on file   Intimate Partner Violence:     Fear of Current or Ex-Partner: Not on file    Emotionally Abused: Not on file    Physically Abused: Not on file    Sexually Abused: Not on file   Housing Stability:     Unable to Pay for Housing in the Last Year: Not on file    Number of [place] in the Last Year: Not on file    Unstable Housing in the Last Year: Not on file     Review of Systems:   · Constitutional: there has been no unanticipated weight loss. There's been no change in energy level, sleep pattern, or activity level. · Eyes: No visual changes or diplopia. No scleral icterus. · ENT: No Headaches, hearing loss or vertigo. No mouth sores or sore throat. · Cardiovascular: Reviewed in HPI  · Respiratory: No cough or wheezing, no sputum production. No hematemesis. · Gastrointestinal: No abdominal pain, appetite loss, blood in stools. No change in bowel or bladder habits. · Genitourinary: No dysuria, trouble voiding, or hematuria. · Musculoskeletal:  No gait disturbance, weakness or joint complaints. · Integumentary: No rash or pruritis. · Neurological: No headache, diplopia, change in muscle strength, numbness or tingling. No change in gait, balance, coordination, mood, affect, memory, mentation, behavior. · Psychiatric: No anxiety, no depression.   · Endocrine: No malaise, fatigue or temperature intolerance. No excessive thirst, fluid intake, or urination. No tremor. · Hematologic/Lymphatic: No abnormal bruising or bleeding, blood clots or swollen lymph nodes. · Allergic/Immunologic: No nasal congestion or hives. Physical Examination:    Vitals:    01/10/22 1026   BP: 114/66   Pulse: 70   SpO2: 99%   Weight: 189 lb (85.7 kg)   Height: 5' 4\" (1.626 m)   Stable. Wt Readings from Last 3 Encounters:   01/10/22 189 lb (85.7 kg)   12/15/21 184 lb (83.5 kg)   06/23/21 189 lb (85.7 kg)     BP Readings from Last 3 Encounters:   01/10/22 114/66   12/15/21 132/80   06/23/21 128/76       Constitutional and General Appearance: Warm and dry, no apparent distress, normal coloration. HEENT:  Normocephalic, atraumatic  Respiratory:  · Normal excursion and expansion without use of accessory muscles  · Resp Auscultation: Normal breath sounds without dullness  Cardiovascular:  · The apical impulses not displaced  · Heart tones are crisp and normal  · JVP ~ 8 cm H2O.  · Regular rate and rhythm, normal O6I3, 2/6 systolic murmur at USB, no g/r/c  · Peripheral pulses are symmetrical and full  · There is no clubbing, cyanosis of the extremities. · No edema in ankles.   · Pedal Pulses: 2+ and equal   Abdomen:  · No masses or tenderness  · Liver/Spleen: No Abnormalities Noted  Neurological/Psychiatric:  · Alert and oriented in all spheres  · Moves all extremities well  · Exhibits normal gait balance and coordination  · No abnormalities of mood, affect, memory, mentation, or behavior are noted    Lab Data:  CBC:   Lab Results   Component Value Date    WBC 5.7 06/11/2021    WBC 4.8 12/15/2020    WBC 5.5 06/03/2020    RBC 4.31 06/11/2021    RBC 4.32 12/15/2020    RBC 4.51 06/03/2020    HGB 13.0 06/11/2021    HGB 12.6 12/15/2020    HGB 13.2 06/03/2020    HCT 37.8 06/11/2021    HCT 38.0 12/15/2020    HCT 40.0 06/03/2020    MCV 87.6 06/11/2021    MCV 87.9 12/15/2020    MCV 88.7 06/03/2020    RDW 13.9 06/11/2021    RDW 14.3 12/15/2020    RDW 13.7 06/03/2020     06/11/2021     12/15/2020     06/03/2020     BMP:   Lab Results   Component Value Date     06/11/2021     12/15/2020     06/03/2020    K 4.5 06/11/2021    K 4.4 12/15/2020    K 4.5 06/03/2020     06/11/2021     12/15/2020     06/03/2020    CO2 27 06/11/2021    CO2 29 12/15/2020    CO2 26 06/03/2020    PHOS 3.9 11/29/2012    BUN 14 06/11/2021    BUN 16 12/15/2020    BUN 17 06/03/2020    CREATININE 0.8 06/11/2021    CREATININE 0.7 12/15/2020    CREATININE 0.8 06/03/2020     BNP:   Lab Results   Component Value Date    BNP 40 03/03/2014    BNP 28 11/29/2012    BNP 52 08/10/2012     FASTING LIPID PANEL:  Lab Results   Component Value Date    CHOL 153 06/11/2021    HDL 56 06/11/2021    HDL 65 01/23/2012    TRIG 121 06/11/2021     Testing:   Echo 12/6/21   Normal left ventricle size, wall thickness and systolic function with an   estimated ejection fraction of 55%. No regional wall motion abnormalities   are seen. E/e\"= 7.2 . Grade I diastolic dysfunction. Normal right ventricular size and function. TAPSE 2.5 cm. RV S velocity 11.1 cm/s. Mild to moderate tricuspid regurgitation. IVC size is normal (<2.1cm) and collapses > 50% with respiration consistent   with normal RA pressure (3mmHg). 6 Minute [place] [address]  6/15/21  Pre Walk:  124/62,  HR 68,  Saturation 98%  Immediate walk:  158/78 HR 76, Saturation 100%  5 Minute post Walk: 138/80 HR 77, Saturation 100%    Walked 6 minutes, no rests. 402.6 meters walked. 474 meters on [place] [address] 6/20/2017    Echo 12/4/2020  -Overall left ventricular function is normal.   -Ejection fraction is visually estimated to be 55-60 %. E/e'= 8.4   -No regional wall motion abnormalities are noted. -Grade I diastolic dysfunction with normal LV filling pressures. -Mild tricuspid regurgitation.    Estimated pulmonary artery systolic pressure is mildly to moderately   elevated but unable to measure exact PASP due to incomplete TR envelope. Echo  12/2/19  Summary   *Left ventricle - normal size, thickness and function with EF of 60%   *Mitral valve - mild regurgitation   *Tricuspid valve - mild regurgitation with PASP of 31mmHg    Echo 12/10/2018  Normal left ventricle size, wall thickness and systolic function with an  estimated ejection fraction of 60%. No regional wall motion abnormalities  are seen. E/e\"= 98.9  Normal diastolic function. Mild mitral regurgitation is present. There is mild-moderate tricuspid regurgitation with RVSP estimated at 30  mmHg    Echo 12/21/2017    Normal left ventricle size, wall thickness and systolic function with an   estimated ejection fraction of 60%. No regional wall motion abnormalities   are seen.   E/e\"= 10. Normal diastolic function.   Mild mitral regurgitation.   Mild-moderate tricuspid regurgitation with RVSP estimated at 34 mmHg.   Trivial pulmonic regurgitation.       Echo 12/2/2016  RVSP 34 mm Hg essentially unchanged from 8/2015. Echo 8/2015  Normal left ventricle size, wall thickness and systolic function with an estimated ejection fraction of 55%. -No regional wall motion abnormalities  are seen. Mild mitral regurgitation is present. There is mild-moderate tricuspid regurgitation with RVSP estimated at 35 mmHG    [facility] 11/29/12 - RA: 7  V: 43/8  PA: 42/14, mean 25  PCWP: 10  Thermodilution CO: 6.47 Thermodilution CI: 3.42   Jaki CO: 5.51  Jaki CI: 2.92  PA Sat: 72.5%  PVR: 161 dynes      Labs were reviewed including labs from other hospital systems through [facility]. Cardiac testing was reviewed including echos, nuclear scans, cardiac catheterization, including from other hospital systems through [facility]. Assessment:  1. PAH (pulmonary artery hypertension)    2. Essential hypertension    3. CREST syndrome    4. Pure hypercholesterolemia    5. SOB (shortness of breath)    6. Leg swelling        1.  Pulmonary artery

## 2022-01-10 ENCOUNTER — OFFICE VISIT (OUTPATIENT)
Dept: CARDIOLOGY CLINIC | Age: 72
End: 2022-01-10
Payer: COMMERCIAL

## 2022-01-10 VITALS
DIASTOLIC BLOOD PRESSURE: 66 MMHG | OXYGEN SATURATION: 99 % | HEIGHT: 64 IN | HEART RATE: 70 BPM | WEIGHT: 189 LBS | SYSTOLIC BLOOD PRESSURE: 114 MMHG | BODY MASS INDEX: 32.27 KG/M2

## 2022-01-10 DIAGNOSIS — M79.89 LEG SWELLING: ICD-10-CM

## 2022-01-10 DIAGNOSIS — M34.1 CREST SYNDROME (HCC): ICD-10-CM

## 2022-01-10 DIAGNOSIS — I27.21 PAH (PULMONARY ARTERY HYPERTENSION) (HCC): Primary | ICD-10-CM

## 2022-01-10 DIAGNOSIS — E78.00 PURE HYPERCHOLESTEROLEMIA: ICD-10-CM

## 2022-01-10 DIAGNOSIS — I10 ESSENTIAL HYPERTENSION: ICD-10-CM

## 2022-01-10 DIAGNOSIS — R06.02 SOB (SHORTNESS OF BREATH): ICD-10-CM

## 2022-01-10 PROCEDURE — 99214 OFFICE O/P EST MOD 30 MIN: CPT | Performed by: INTERNAL MEDICINE

## 2022-01-10 RX ORDER — FUROSEMIDE 20 MG/1
20 TABLET ORAL DAILY PRN
Qty: 45 TABLET | Refills: 2 | Status: SHIPPED | OUTPATIENT
Start: 2022-01-10

## 2022-01-11 ENCOUNTER — HOSPITAL ENCOUNTER (OUTPATIENT)
Age: 72
Discharge: HOME | End: 2022-01-11
Payer: COMMERCIAL

## 2022-01-11 DIAGNOSIS — E78.00 PURE HYPERCHOLESTEROLEMIA: ICD-10-CM

## 2022-01-11 DIAGNOSIS — I27.21 PAH (PULMONARY ARTERY HYPERTENSION) (HCC): ICD-10-CM

## 2022-01-11 DIAGNOSIS — M34.1 CREST SYNDROME (HCC): ICD-10-CM

## 2022-01-11 DIAGNOSIS — I10 ESSENTIAL HYPERTENSION: ICD-10-CM

## 2022-01-11 DIAGNOSIS — R06.02 SOB (SHORTNESS OF BREATH): ICD-10-CM

## 2022-01-11 LAB
A/G RATIO: 1.7 (ref 1.1–2.2)
ALBUMIN SERPL-MCNC: 4 G/DL (ref 3.4–5)
ALP BLD-CCNC: 66 U/L (ref 40–129)
ALT SERPL-CCNC: 15 U/L (ref 10–40)
ANION GAP SERPL CALCULATED.3IONS-SCNC: 11 MMOL/L (ref 3–16)
AST SERPL-CCNC: 19 U/L (ref 15–37)
BILIRUB SERPL-MCNC: 0.3 MG/DL (ref 0–1)
BUN BLDV-MCNC: 12 MG/DL (ref 7–20)
CALCIUM SERPL-MCNC: 9.1 MG/DL (ref 8.3–10.6)
CHLORIDE BLD-SCNC: 104 MMOL/L (ref 99–110)
CHOLESTEROL, TOTAL: 151 MG/DL (ref 0–199)
CO2: 26 MMOL/L (ref 21–32)
CREAT SERPL-MCNC: 0.6 MG/DL (ref 0.6–1.2)
GFR AFRICAN AMERICAN: >60
GFR NON-AFRICAN AMERICAN: >60
GLUCOSE BLD-MCNC: 91 MG/DL (ref 70–99)
HCT VFR BLD CALC: 36.7 % (ref 36–48)
HDLC SERPL-MCNC: 64 MG/DL (ref 40–60)
HEMOGLOBIN: 12.4 G/DL (ref 12–16)
LDL CHOLESTEROL CALCULATED: 67 MG/DL
MCH RBC QN AUTO: 29.2 PG (ref 26–34)
MCHC RBC AUTO-ENTMCNC: 33.8 G/DL (ref 31–36)
MCV RBC AUTO: 86.4 FL (ref 80–100)
PDW BLD-RTO: 14 % (ref 12.4–15.4)
PLATELET # BLD: 143 K/UL (ref 135–450)
PMV BLD AUTO: 8.8 FL (ref 5–10.5)
POTASSIUM SERPL-SCNC: 4 MMOL/L (ref 3.5–5.1)
PRO-BNP: 168 PG/ML (ref 0–124)
RBC # BLD: 4.24 M/UL (ref 4–5.2)
SODIUM BLD-SCNC: 141 MMOL/L (ref 136–145)
TOTAL PROTEIN: 6.4 G/DL (ref 6.4–8.2)
TRIGL SERPL-MCNC: 101 MG/DL (ref 0–150)
VLDLC SERPL CALC-MCNC: 20 MG/DL
WBC # BLD: 4.5 K/UL (ref 4–11)

## 2022-01-11 PROCEDURE — 36415 COLL VENOUS BLD VENIPUNCTURE: CPT

## 2022-01-11 PROCEDURE — 85027 COMPLETE CBC AUTOMATED: CPT

## 2022-01-11 PROCEDURE — 83880 ASSAY OF NATRIURETIC PEPTIDE: CPT

## 2022-01-11 PROCEDURE — 80053 COMPREHEN METABOLIC PANEL: CPT

## 2022-01-11 PROCEDURE — 80061 LIPID PANEL: CPT

## 2022-02-08 ENCOUNTER — TELEPHONE (OUTPATIENT)
Dept: FAMILY MEDICINE CLINIC | Age: 72
End: 2022-02-08

## 2022-02-08 DIAGNOSIS — Z23 NEED FOR SHINGLES VACCINE: Primary | ICD-10-CM

## 2022-02-08 RX ORDER — ZOSTER VACCINE RECOMBINANT, ADJUVANTED 50 MCG/0.5
0.5 KIT INTRAMUSCULAR SEE ADMIN INSTRUCTIONS
Qty: 0.5 ML | Refills: 1 | Status: SHIPPED | OUTPATIENT
Start: 2022-02-08 | End: 2022-08-07

## 2022-03-04 DIAGNOSIS — M34.1 CREST SYNDROME (HCC): ICD-10-CM

## 2022-03-04 DIAGNOSIS — I73.00 RAYNAUD'S DISEASE WITHOUT GANGRENE: ICD-10-CM

## 2022-03-04 DIAGNOSIS — I10 ESSENTIAL HYPERTENSION: ICD-10-CM

## 2022-03-04 DIAGNOSIS — R06.02 SOB (SHORTNESS OF BREATH): ICD-10-CM

## 2022-03-04 DIAGNOSIS — I27.21 PAH (PULMONARY ARTERY HYPERTENSION) (HCC): ICD-10-CM

## 2022-03-04 DIAGNOSIS — E78.00 PURE HYPERCHOLESTEROLEMIA: ICD-10-CM

## 2022-03-08 RX ORDER — VALSARTAN AND HYDROCHLOROTHIAZIDE 160; 25 MG/1; MG/1
TABLET ORAL
Qty: 90 TABLET | Refills: 3 | Status: SHIPPED | OUTPATIENT
Start: 2022-03-08

## 2022-03-08 RX ORDER — ATORVASTATIN CALCIUM 10 MG/1
TABLET, FILM COATED ORAL
Qty: 90 TABLET | Refills: 3 | Status: SHIPPED | OUTPATIENT
Start: 2022-03-08

## 2022-04-13 SDOH — HEALTH STABILITY: PHYSICAL HEALTH: ON AVERAGE, HOW MANY DAYS PER WEEK DO YOU ENGAGE IN MODERATE TO STRENUOUS EXERCISE (LIKE A BRISK WALK)?: 0 DAYS

## 2022-04-13 SDOH — HEALTH STABILITY: PHYSICAL HEALTH: ON AVERAGE, HOW MANY MINUTES DO YOU ENGAGE IN EXERCISE AT THIS LEVEL?: 0 MIN

## 2022-04-13 ASSESSMENT — LIFESTYLE VARIABLES
HOW MANY STANDARD DRINKS CONTAINING ALCOHOL DO YOU HAVE ON A TYPICAL DAY: 1 OR 2
HOW OFTEN DO YOU HAVE A DRINK CONTAINING ALCOHOL: 2
HOW OFTEN DO YOU HAVE SIX OR MORE DRINKS ON ONE OCCASION: 1
HOW OFTEN DO YOU HAVE A DRINK CONTAINING ALCOHOL: MONTHLY OR LESS
HOW MANY STANDARD DRINKS CONTAINING ALCOHOL DO YOU HAVE ON A TYPICAL DAY: 1

## 2022-04-13 ASSESSMENT — PATIENT HEALTH QUESTIONNAIRE - PHQ9
SUM OF ALL RESPONSES TO PHQ QUESTIONS 1-9: 0
SUM OF ALL RESPONSES TO PHQ9 QUESTIONS 1 & 2: 0
SUM OF ALL RESPONSES TO PHQ QUESTIONS 1-9: 0
SUM OF ALL RESPONSES TO PHQ QUESTIONS 1-9: 0
2. FEELING DOWN, DEPRESSED OR HOPELESS: 0
1. LITTLE INTEREST OR PLEASURE IN DOING THINGS: 0
SUM OF ALL RESPONSES TO PHQ QUESTIONS 1-9: 0

## 2022-04-14 ENCOUNTER — OFFICE VISIT (OUTPATIENT)
Dept: FAMILY MEDICINE CLINIC | Age: 72
End: 2022-04-14
Payer: COMMERCIAL

## 2022-04-14 VITALS
HEIGHT: 64 IN | BODY MASS INDEX: 32.61 KG/M2 | DIASTOLIC BLOOD PRESSURE: 82 MMHG | TEMPERATURE: 98.2 F | OXYGEN SATURATION: 99 % | HEART RATE: 79 BPM | SYSTOLIC BLOOD PRESSURE: 128 MMHG | WEIGHT: 191 LBS

## 2022-04-14 DIAGNOSIS — Z78.0 POSTMENOPAUSAL: ICD-10-CM

## 2022-04-14 DIAGNOSIS — I10 PRIMARY HYPERTENSION: Chronic | ICD-10-CM

## 2022-04-14 DIAGNOSIS — M85.80 OSTEOPENIA, UNSPECIFIED LOCATION: ICD-10-CM

## 2022-04-14 DIAGNOSIS — M34.1 CREST SYNDROME (HCC): Chronic | ICD-10-CM

## 2022-04-14 DIAGNOSIS — I73.00 RAYNAUD'S DISEASE WITHOUT GANGRENE: ICD-10-CM

## 2022-04-14 DIAGNOSIS — Z00.00 MEDICARE ANNUAL WELLNESS VISIT, SUBSEQUENT: Primary | ICD-10-CM

## 2022-04-14 DIAGNOSIS — I27.21 PAH (PULMONARY ARTERY HYPERTENSION) (HCC): Chronic | ICD-10-CM

## 2022-04-14 PROCEDURE — G0439 PPPS, SUBSEQ VISIT: HCPCS | Performed by: NURSE PRACTITIONER

## 2022-04-14 SDOH — ECONOMIC STABILITY: TRANSPORTATION INSECURITY
IN THE PAST 12 MONTHS, HAS THE LACK OF TRANSPORTATION KEPT YOU FROM MEDICAL APPOINTMENTS OR FROM GETTING MEDICATIONS?: NO

## 2022-04-14 SDOH — ECONOMIC STABILITY: FOOD INSECURITY: WITHIN THE PAST 12 MONTHS, YOU WORRIED THAT YOUR FOOD WOULD RUN OUT BEFORE YOU GOT MONEY TO BUY MORE.: NEVER TRUE

## 2022-04-14 SDOH — ECONOMIC STABILITY: FOOD INSECURITY: WITHIN THE PAST 12 MONTHS, THE FOOD YOU BOUGHT JUST DIDN'T LAST AND YOU DIDN'T HAVE MONEY TO GET MORE.: NEVER TRUE

## 2022-04-14 ASSESSMENT — SOCIAL DETERMINANTS OF HEALTH (SDOH): HOW HARD IS IT FOR YOU TO PAY FOR THE VERY BASICS LIKE FOOD, HOUSING, MEDICAL CARE, AND HEATING?: NOT HARD AT ALL

## 2022-04-14 NOTE — PATIENT INSTRUCTIONS
Personalized Preventive Plan for [place] Many - 4/14/2022  Medicare offers a range of preventive health benefits. Some of the tests and screenings are paid in full while other may be subject to a deductible, co-insurance, and/or copay. Some of these benefits include a comprehensive review of your medical history including lifestyle, illnesses that may run in your family, and various assessments and screenings as appropriate. After reviewing your medical record and screening and assessments performed today your provider may have ordered immunizations, labs, imaging, and/or referrals for you. A list of these orders (if applicable) as well as your Preventive Care list are included within your After Visit Summary for your review. Other Preventive Recommendations:    · A preventive eye exam performed by an eye specialist is recommended every 1-2 years to screen for glaucoma; cataracts, macular degeneration, and other eye disorders. · A preventive dental visit is recommended every 6 months. · Try to get at least 150 minutes of exercise per week or 10,000 steps per day on a pedometer . · Order or download the FREE \"Exercise & Physical Activity: Your Everyday Guide\" from [employer]. Call 4-525.144.4839 or search [employer] online. · You need 0584-9889 mg of calcium and 2427-9813 IU of vitamin D per day. It is possible to meet your calcium requirement with diet alone, but a vitamin D supplement is usually necessary to meet this goal.  · When exposed to the sun, use a sunscreen that protects against both UVA and UVB radiation with an SPF of 30 or greater. Reapply every 2 to 3 hours or after sweating, drying off with a towel, or swimming. · Always wear a seat belt when traveling in a car. Always wear a helmet when riding a bicycle or motorcycle.

## 2022-04-14 NOTE — PROGRESS NOTES
Medicare Annual Wellness Visit    Matheus Antonio is here for Medicare AWV (MEDICARE AWV, LAST FASTING BW IN 01/22)    Assessment & Plan   Medicare annual wellness visit, subsequent  -Health care topics addressed as below  -Normal exam today except for toe. Continue with conservative therapy. Follow-up as needed for this. -Recent lab work performed by cardiology was reviewed. No changes needed to medication regimen.  -Care gaps addressed. Will be getting the shingles vaccine at the pharmacy. Planning to schedule a mammogram and DEXA scan.  -Follow-up at annual wellness visit in 1 year. CREST syndrome (HCC)  -No changes to plan. Continue following with cardiology and rheumatology. PAH (pulmonary artery hypertension) (HCC)  -No changes to plan. Continue to follow with cardiology. Raynaud's disease without gangrene  -Managed well over the winter with gloves. Continue following with rheumatology. Primary hypertension  -Blood pressure controlled on current medication regimen. Denies any dizziness, headaches, chest pain, shortness of breath, or leg swelling. No changes to plan today. Osteopenia, unspecified location  -Due for follow-up DEXA scan. Has been taking calcium/vitamin D supplement. Previous DEXA showed osteopenia. Recommended 3-year follow-up. Placing order. No intervention pending result. Postmenopausal  -Due for follow-up DEXA scan. Has been taking calcium/vitamin D supplement. Previous DEXA showed osteopenia. Recommended 3-year follow-up. Placing order. No intervention pending result. -     DEXA BONE DENSITY AXIAL SKELETON; Future      Recommendations for Preventive Services Due: see orders and patient instructions/AVS.  Recommended screening schedule for the next 5-10 years is provided to the patient in written form: see Patient Instructions/AVS.     Return for Medicare Annual Wellness Visit in 1 year.      Subjective   The following acute and/or chronic problems were also addressed today:    HPI: Henry Hurley presents for her AWV. She reports no changes to the plans from Cardiology and Rheumatology. She is managing her chronic diseases well. She has a toe on her left foot that is rotated and is forming a callus. She is using toe socks and a supportive wrap to help the stoe stay aligned. It is not very painful and does not required ibuprofen or tylenol. She states she usually goes to bed late and gets up around 9am. She sometimes wakes up throughout the night, does not have to go to the bathroom, and is able to go back to sleep without issue. She has some mild fatigue daily but does not believe it is a problem that needs to be addressed. She states that she believes she can be more active. She has a [employer], but does not often go. She would like to be walking outside more once the weather improves. She is planning on going to her grandson's baseball team trip to [place] [id number] this summer as well as has going to [employer] in May. Patient's complete Health Risk Assessment and screening values have been reviewed and are found in Flowsheets. The following problems were reviewed today and where indicated follow up appointments were made and/or referrals ordered. Positive Risk Factor Screenings with Interventions:               General Health and ACP:  General  In general, how would you say your health is?: Good  In the past 7 days, have you experienced any of the following: New or Increased Pain, New or Increased Fatigue, Loneliness, Social Isolation, Stress or Anger?: No  Do you get the social and emotional support that you need?: Yes  Do you have a Living Will?: (!) No    Advance Directives     Power of  Living Will ACP-Advance Directive ACP-Power of     Not on File Not on File Filed Not on File      General Health Risk Interventions:  · No Living Will: Declines. Already has plans to see someone.     Health Habits/Nutrition:     Physical Activity: Inactive    Days of Exercise per Week: 0 days    Minutes of Exercise per Session: 0 min     Have you lost any weight without trying in the past 3 months?: No  Body mass index: (!) 32.78  Have you seen the dentist within the past year?: Yes    Health Habits/Nutrition Interventions:  · Inadequate physical activity:  patient agrees to exercise for at least 150 minutes/week     Safety:  Do you have working smoke detectors?: Yes  Do you have any tripping hazards - loose or unsecured carpets or rugs?: (!) Yes  Do you have any tripping hazards - clutter in doorways, halls, or stairs?: No  Do you have either shower bars, grab bars, non-slip mats or non-slip surfaces in your shower or bathtub?: (!) No  Do all of your stairways have a railing or banister?: Yes  Do you always fasten your seatbelt when you are in a car?: Yes    Safety Interventions:  · Home safety tips provided           Objective   Vitals:    04/14/22 0834   BP: 128/82   Site: Left Upper Arm   Position: Sitting   Cuff Size: Large Adult   Pulse: 79   Temp: 98.2 °F (36.8 °C)   TempSrc: Temporal   SpO2: 99%   Weight: 191 lb (86.6 kg)   Height: 5' 4\" (1.626 m)      Body mass index is 32.79 kg/m².         General Appearance: alert and oriented to person, place and time, well developed and well- nourished, in no acute distress  Skin: warm and dry, no rash or erythema  Head: normocephalic and atraumatic  Eyes: pupils equal, round, and reactive to light, extraocular eye movements intact, conjunctivae normal  ENT: tympanic membrane, external ear and ear canal normal bilaterally, nose without deformity, nasal mucosa and turbinates normal without polyps  Neck: supple and non-tender without mass, no thyromegaly or thyroid nodules, no cervical lymphadenopathy  Pulmonary/Chest: clear to auscultation bilaterally- no wheezes, rales or rhonchi, normal air movement, no respiratory distress  Cardiovascular: normal rate, regular rhythm, normal S1 and S2, no murmurs, rubs, clicks, or gallops, distal pulses intact, no carotid bruits  Abdomen: soft, non-tender, non-distended, normal bowel sounds, no masses or organomegaly  Extremities: no cyanosis, clubbing or edema  Musculoskeletal: normal range of motion, no joint swelling, or tenderness; left 4th toe medially rotated, callus forming to lateral aspect  Neurologic: reflexes normal and symmetric, no cranial nerve deficit, gait, coordination and speech normal       No Known Allergies  Prior to Visit Medications    Medication Sig Taking? Authorizing Provider   atorvastatin (LIPITOR) 10 MG tablet TAKE 1 TABLET DAILY Yes Shona Jaramillo MD   valsartan-hydroCHLOROthiazide (DIOVAN-HCT) 160-25 MG per tablet TAKE 1 TABLET DAILY Yes Shona Jaramillo MD   furosemide (LASIX) 20 MG tablet Take 1 tablet by mouth daily as needed (swelling) Yes Shona Jaramillo MD   amLODIPine (NORVASC) 5 MG tablet TAKE 1 TABLET DAILY Yes Shona Jaramillo MD   aspirin 81 MG EC tablet Take 1 tablet by mouth daily  Patient taking differently: Take 40.5 mg by mouth daily  Yes EM Zepeda - CNP   vitamin E 400 UNIT capsule Take 400 Units by mouth daily Yes Historical Provider, MD   Cholecalciferol (VITAMIN D3) 1000 UNITS CAPS Take 1 capsule by mouth daily  Yes Ronal John MD   Garlic 0072 MG TABS Take 1 tablet by mouth daily  Yes Historical Provider, MD   Coenzyme Q10 (CO Q-10) 100 MG CAPS Take 1 capsule by mouth daily  Yes Historical Provider, MD   Cinnamon 500 MG TABS Take 1 tablet by mouth daily  Yes Historical Provider, MD   calcium carbonate 600 MG TABS tablet Take 1 tablet by mouth daily. Yes Historical Provider, MD   Ginkgo Biloba 40 MG TABS Take 40 mg by mouth daily.  Yes Historical Provider, MD   Multiple Vitamin (MULTIVITAMIN PO) Take 1 tablet by mouth daily  Yes Historical Provider, MD   Pyridoxine HCl (VITAMIN B-6 PO) Take 1 tablet by mouth daily  Yes Historical Provider, MD   fish oil-omega-3 fatty acids 1000 MG capsule Take 1 g by mouth daily  Yes

## 2022-06-02 ENCOUNTER — HOSPITAL ENCOUNTER (OUTPATIENT)
Dept: GENERAL RADIOLOGY | Age: 72
Discharge: HOME | End: 2022-06-02
Payer: COMMERCIAL

## 2022-06-02 DIAGNOSIS — Z78.0 POSTMENOPAUSAL: ICD-10-CM

## 2022-06-02 PROCEDURE — 77080 DXA BONE DENSITY AXIAL: CPT

## 2022-06-15 ENCOUNTER — OFFICE VISIT (OUTPATIENT)
Dept: RHEUMATOLOGY | Age: 72
End: 2022-06-15
Payer: COMMERCIAL

## 2022-06-15 VITALS
BODY MASS INDEX: 31.92 KG/M2 | DIASTOLIC BLOOD PRESSURE: 80 MMHG | SYSTOLIC BLOOD PRESSURE: 128 MMHG | HEART RATE: 72 BPM | WEIGHT: 187 LBS | HEIGHT: 64 IN

## 2022-06-15 DIAGNOSIS — M34.1 CREST SYNDROME (HCC): Primary | ICD-10-CM

## 2022-06-15 DIAGNOSIS — M34.1 CREST SYNDROME (HCC): ICD-10-CM

## 2022-06-15 LAB
CREAT SERPL-MCNC: 0.8 MG/DL (ref 0.6–1.2)
GFR AFRICAN AMERICAN: >60
GFR NON-AFRICAN AMERICAN: >60

## 2022-06-15 PROCEDURE — 1123F ACP DISCUSS/DSCN MKR DOCD: CPT | Performed by: INTERNAL MEDICINE

## 2022-06-15 PROCEDURE — 99213 OFFICE O/P EST LOW 20 MIN: CPT | Performed by: INTERNAL MEDICINE

## 2022-07-01 ENCOUNTER — TELEPHONE (OUTPATIENT)
Dept: FAMILY MEDICINE CLINIC | Age: 72
End: 2022-07-01

## 2022-07-01 RX ORDER — ALENDRONATE SODIUM 70 MG/1
70 TABLET ORAL
Qty: 4 TABLET | Refills: 11 | Status: SHIPPED | OUTPATIENT
Start: 2022-07-01 | End: 2022-08-31 | Stop reason: SDUPTHER

## 2022-07-01 NOTE — TELEPHONE ENCOUNTER
Pt wants to know if there is a generic Fosamax that she can take. Sharyn told her yes. The Fosamax name brand if high in price. If this is ok can you send in a generic fosamax for her??      Sharyn

## 2022-07-12 NOTE — PROGRESS NOTES
[facility]   Advanced Heart Failure/Pulmonary Hypertension  Cardiac Evaluation      Kareem Willingham  YOB: 1950    Date of Visit:  7/18/22  Chief Complaint   Patient presents with    Shortness of Breath      History of Present Illness:   Kareem Willingham has a history of PAH related to CREST syndrome. She was started on Letairis 5mg in Dec after a [address] on 11/29/12. She stopped Letaris due to swelling and does not want to take another medication in it's place. She does suffer from Raynaud's and has cold fingers but no wounds. Sees Dr. Jaycob Berumen regularly. Her echo 12/2021 showed normal right ventricular size and function and EF 55%. She continues to follow with Dr. Jaycob Berumen. She is fully vaccinated and boosted. Today, she is here for regular follow up. Overall, she feels well. She reports that sometimes at night (maybe twice a week), she awakens feeling sweaty, and her Fitbit shows her HR is elevated low 100's; it comes back down after a few minutes. This does not occur during the day, denies any heart racing. She denies exertional chest pain, POOLE/PND, light-headedness, edema. Her  is with her for the visit.     NYHA:  II    No Known Allergies    Current Outpatient Medications:     alendronate (FOSAMAX) 70 MG tablet, Take 1 tablet by mouth every 7 days, Disp: 4 tablet, Rfl: 11    atorvastatin (LIPITOR) 10 MG tablet, TAKE 1 TABLET DAILY, Disp: 90 tablet, Rfl: 3    valsartan-hydroCHLOROthiazide (DIOVAN-HCT) 160-25 MG per tablet, TAKE 1 TABLET DAILY, Disp: 90 tablet, Rfl: 3    amLODIPine (NORVASC) 5 MG tablet, TAKE 1 TABLET DAILY, Disp: 90 tablet, Rfl: 3    aspirin 81 MG EC tablet, Take 1 tablet by mouth daily (Patient taking differently: Take 40.5 mg by mouth in the morning.), Disp: 90 tablet, Rfl: 3    vitamin E 400 UNIT capsule, Take 400 Units by mouth daily, Disp: , Rfl:     Cholecalciferol (VITAMIN D3) 1000 UNITS CAPS, Take 1 capsule by mouth daily , Disp: 30 capsule, Rfl:     Garlic 5455 MG TABS, Take 1 tablet by mouth daily , Disp: , Rfl:     Coenzyme Q10 (CO Q-10) 100 MG CAPS, Take 1 capsule by mouth daily , Disp: , Rfl:     Cinnamon 500 MG TABS, Take 1 tablet by mouth daily , Disp: , Rfl:     calcium carbonate 600 MG TABS tablet, Take 1 tablet by mouth daily. , Disp: , Rfl:     Ginkgo Biloba 40 MG TABS, Take 40 mg by mouth daily. , Disp: , Rfl:     Multiple Vitamin (MULTIVITAMIN PO), Take 1 tablet by mouth daily , Disp: , Rfl:     Pyridoxine HCl (VITAMIN B-6 PO), Take 1 tablet by mouth daily , Disp: , Rfl:     fish oil-omega-3 fatty acids 1000 MG capsule, Take 1 g by mouth daily , Disp: , Rfl:     vitamin B-12 (CYANOCOBALAMIN) 100 MCG tablet, Take 100 mcg by mouth daily. , Disp: , Rfl:     Flaxseed, Linseed, (FLAX SEED OIL) 1000 MG CAPS, Take 1,000 mg by mouth daily. , Disp: , Rfl:     Ascorbic Acid (VITAMIN C) 500 MG tablet, Take 500 mg by mouth daily.   , Disp: , Rfl:     zoster recombinant adjuvanted vaccine (SHINGRIX) 50 MCG/0.5ML SUSR injection, Inject 0.5 mLs into the muscle See Admin Instructions 1 dose now and repeat in 2-6 months, Disp: 0.5 mL, Rfl: 1    furosemide (LASIX) 20 MG tablet, Take 1 tablet by mouth daily as needed (swelling) (Patient not taking: Reported on 7/18/2022), Disp: 45 tablet, Rfl: 2      Immunization History   Administered Date(s) Administered    COVID-19, MODERNA BLUE border, Primary or Immunocompromised, (age 12y+), IM, 100 mcg/0.5mL 02/23/2021, 03/23/2021, 11/15/2021    COVID-19, MODERNA Booster BLUE border, (age 18y+), IM, 50mcg/0.25mL 11/15/2021    Influenza 11/30/2015    Influenza Virus Vaccine 10/15/2014    Influenza Whole 09/23/2009    Influenza, High Dose (Fluzone 65 yrs and older) 11/30/2015, 12/12/2016, 10/30/2018, 11/05/2019    Influenza, High-dose, Aleks Staple, 65 yrs +, IM (Fluzone) 10/29/2020    Influenza, Quadv, adjuvanted, 65 yrs +, IM, PF (Fluad) 11/15/2021    Pneumococcal Conjugate 13-valent (Mary Mclaughlni) 08/13/2015    Other Topics Concern    Not on file   Social History Narrative    Not on file     Social Determinants of Health     Financial Resource Strain: Low Risk     Difficulty of Paying Living Expenses: Not hard at all   Food Insecurity: No Food Insecurity    Worried About [address] in the Last Year: Never true    [facility] in the Last Year: Never true   Transportation Needs: No Transportation Needs    Lack of Transportation (Medical): No    Lack of Transportation (Non-Medical): No   Physical Activity: Inactive    Days of Exercise per Week: 0 days    Minutes of Exercise per Session: 0 min   Stress: Not on file   Social Connections: Not on file   Intimate Partner Violence: Not on file   Housing Stability: Not on file     Review of Systems:   Constitutional: there has been no unanticipated weight loss. There's been no change in energy level, sleep pattern, or activity level. Eyes: No visual changes or diplopia. No scleral icterus. ENT: No Headaches, hearing loss or vertigo. No mouth sores or sore throat. Cardiovascular: Reviewed in HPI  Respiratory: No cough or wheezing, no sputum production. No hematemesis. Gastrointestinal: No abdominal pain, appetite loss, blood in stools. No change in bowel or bladder habits. Genitourinary: No dysuria, trouble voiding, or hematuria. Musculoskeletal:  No gait disturbance, weakness or joint complaints. Integumentary: No rash or pruritis. Neurological: No headache, diplopia, change in muscle strength, numbness or tingling. No change in gait, balance, coordination, mood, affect, memory, mentation, behavior. Psychiatric: No anxiety, no depression. Endocrine: No malaise, fatigue or temperature intolerance. No excessive thirst, fluid intake, or urination. No tremor. Hematologic/Lymphatic: No abnormal bruising or bleeding, blood clots or swollen lymph nodes. Allergic/Immunologic: No nasal congestion or hives.     Physical Examination:    Vitals: 07/18/22 1355   BP: 138/78   Site: Left Upper Arm   Position: Sitting   Pulse: 75   SpO2: 99%   Weight: 185 lb (83.9 kg)   Height: 5' 4\" (1.626 m)   Stable. Wt Readings from Last 3 Encounters:   07/18/22 185 lb (83.9 kg)   06/15/22 187 lb (84.8 kg)   04/14/22 191 lb (86.6 kg)     BP Readings from Last 3 Encounters:   07/18/22 138/78   06/15/22 128/80   04/14/22 128/82       Constitutional and General Appearance: Warm and dry, no apparent distress, normal coloration. HEENT:  Normocephalic, atraumatic  Respiratory:  Normal excursion and expansion without use of accessory muscles  Resp Auscultation: Normal breath sounds without dullness  Cardiovascular: The apical impulses not displaced  Heart tones are crisp and normal  JVP ~ 8 cm H2O. Regular rate and rhythm, normal M6Q8, 2/6 systolic murmur at USB, no g/r/c  Peripheral pulses are symmetrical and full  There is no clubbing, cyanosis of the extremities. No edema in ankles.   Pedal Pulses: 2+ and equal   Abdomen:  No masses or tenderness  Liver/Spleen: No Abnormalities Noted  Neurological/Psychiatric:  Alert and oriented in all spheres  Moves all extremities well  Exhibits normal gait balance and coordination  No abnormalities of mood, affect, memory, mentation, or behavior are noted    Lab Data:  CBC:   Lab Results   Component Value Date/Time    WBC 4.5 01/11/2022 10:21 AM    WBC 5.7 06/11/2021 11:50 AM    WBC 4.8 12/15/2020 11:58 AM    RBC 4.24 01/11/2022 10:21 AM    RBC 4.31 06/11/2021 11:50 AM    RBC 4.32 12/15/2020 11:58 AM    HGB 12.4 01/11/2022 10:21 AM    HGB 13.0 06/11/2021 11:50 AM    HGB 12.6 12/15/2020 11:58 AM    HCT 36.7 01/11/2022 10:21 AM    HCT 37.8 06/11/2021 11:50 AM    HCT 38.0 12/15/2020 11:58 AM    MCV 86.4 01/11/2022 10:21 AM    MCV 87.6 06/11/2021 11:50 AM    MCV 87.9 12/15/2020 11:58 AM    RDW 14.0 01/11/2022 10:21 AM    RDW 13.9 06/11/2021 11:50 AM    RDW 14.3 12/15/2020 11:58 AM     01/11/2022 10:21 AM     06/11/2021 11:50 AM     12/15/2020 11:58 AM     BMP:   Lab Results   Component Value Date/Time     01/11/2022 10:21 AM     06/11/2021 11:50 AM     12/15/2020 11:58 AM    K 4.0 01/11/2022 10:21 AM    K 4.5 06/11/2021 11:50 AM    K 4.4 12/15/2020 11:58 AM     01/11/2022 10:21 AM     06/11/2021 11:50 AM     12/15/2020 11:58 AM    CO2 26 01/11/2022 10:21 AM    CO2 27 06/11/2021 11:50 AM    CO2 29 12/15/2020 11:58 AM    PHOS 3.9 11/29/2012 10:40 AM    BUN 12 01/11/2022 10:21 AM    BUN 14 06/11/2021 11:50 AM    BUN 16 12/15/2020 11:58 AM    CREATININE 0.8 06/15/2022 11:20 AM    CREATININE 0.6 01/11/2022 10:21 AM    CREATININE 0.8 06/11/2021 11:50 AM     BNP:   Lab Results   Component Value Date/Time    BNP 40 03/03/2014 10:20 AM    BNP 28 11/29/2012 10:40 AM    BNP 52 08/10/2012 12:53 PM     FASTING LIPID PANEL:  Lab Results   Component Value Date/Time    CHOL 151 01/11/2022 10:21 AM    HDL 64 01/11/2022 10:21 AM    HDL 65 01/23/2012 10:38 AM    TRIG 101 01/11/2022 10:21 AM     Testing:   Echo 12/6/21   Normal left ventricle size, wall thickness and systolic function with an estimated ejection fraction of 55%. No regional wall motion abnormalities are seen. E/e\"= 7.2 . Grade I diastolic dysfunction. Normal right ventricular size and function. TAPSE 2.5 cm. RV S velocity 11.1 cm/s. Mild to moderate tricuspid regurgitation. IVC size is normal (<2.1cm) and collapses > 50% with respiration consistent with normal RA pressure (3mmHg). 6 Minute [address]  6/15/21  Pre Walk:  124/62,  HR 68,  Saturation 98%  Immediate walk:  158/78 HR 76, Saturation 100%  5 Minute post Walk: 138/80 HR 77, Saturation 100%    Walked 6 minutes, no rests. 402.6 meters walked. 474 meters on [address] 6/20/2017    Echo 12/4/2020  -Overall left ventricular function is normal.   -Ejection fraction is visually estimated to be 55-60%. E/e'= 8.4   -No regional wall motion abnormalities are noted.    -Grade I diastolic dysfunction with normal LV filling pressures. -Mild tricuspid regurgitation. Estimated pulmonary artery systolic pressure is mildly to moderately elevated but unable to measure exact PASP due to incomplete TR envelope. Echo  12/2/19  Summary   *Left ventricle - normal size, thickness and function with EF of 60%   *Mitral valve - mild regurgitation   *Tricuspid valve - mild regurgitation with PASP of 31mmHg    Echo 12/10/2018  Normal left ventricle size, wall thickness and systolic function with an  estimated ejection fraction of 60%. No regional wall motion abnormalities  are seen. E/e\"= 76.1  Normal diastolic function. Mild mitral regurgitation is present. There is mild-moderate tricuspid regurgitation with RVSP estimated at 30  mmHg    Echo 12/21/2017    Normal left ventricle size, wall thickness and systolic function with an   estimated ejection fraction of 60%. No regional wall motion abnormalities   are seen. E/e\"= 10. Normal diastolic function. Mild mitral regurgitation. Mild-moderate tricuspid regurgitation with RVSP estimated at 34 mmHg. Trivial pulmonic regurgitation. Echo 12/2/2016  RVSP 34 mm Hg essentially unchanged from 8/2015. Echo 8/2015  Normal left ventricle size, wall thickness and systolic function with an estimated ejection fraction of 55%. -No regional wall motion abnormalities  are seen. Mild mitral regurgitation is present. There is mild-moderate tricuspid regurgitation with RVSP estimated at 35 mmHG    [facility] 11/29/12 - RA: 7  V: 43/8  PA: 42/14, mean 25  PCWP: 10  Thermodilution CO: 6.47 Thermodilution CI: 3.42   Jaki CO: 5.51  Jaki CI: 2.92  PA Sat: 72.5%  PVR: 161 dynes      Labs were reviewed including labs from other hospital systems through [facility]. Cardiac testing was reviewed including echos, nuclear scans, cardiac catheterization, including from other hospital systems through [facility]. Assessment:  1.  PAH (pulmonary artery hypertension) ([facility] [address]    2. Pure hypercholesterolemia    3. Essential hypertension    4. CREST syndrome    5. Raynaud's disease without gangrene    6. SOB (shortness of breath)    7. Palpitations      1. Pulmonary artery hypertension: Stable. ProBNP 12/15/20> 147> 121> 168 (1/11/22)  ProBNP 6/3/20> 186    ECHO 12/6/21 EF> 55%  ECHO 8/28/2015> EF 55%, RVSP 35 mmHg. ECHO 12/16> EF 55%, RVSP 34mmHg. ECHO 12/2/19> EF 60% and RVSP 31mmHg. ECHO 12/4/2020> EF 65%     2. Essential hypertension: Stable. HR elevated low 100's at night while sleeping at times> awakens in a sweat. - /78 (Site: Left Upper Arm, Position: Sitting)   Pulse 75   Ht 5' 4\" (1.626 m)   Wt 185 lb (83.9 kg)   SpO2 99%   BMI 31.76 kg/m²      EKG today 7/18/22 (read & interpreted by me)> NSR 68     3. CREST syndrome: Per Dr. Arabella Kauffman -[facility] 11/26/2012> recommended starting Endothelin antagonist     4.  Raynaud's disease without gangrene:  She experiences both Red and white colored hands. These episodes are more frequent in the winter months. 5. Vitamin D deficiency:   Stable. Vit D 6/11/21> 42.2  Vit D 12/15/20> 40.8  Vit D 6/3/20> 40. 4  Vit D 12/5/19> 28.0       Plan:    1. No med changes  2. Lab work today >CMP, BNP, CBC, lipids  3. Monitor x 1 week  4. ECHO and OV same day in 6 months    Time Based Itemization  A total of 40 minutes was spent on today's patient encounter. If applicable, non-patient-facing activities:  ( x)Preparing to see the patient and reviewing records  (x ) Individual interpretation of results  ( ) Discussion or coordination of care with other health care professionals  ( x) Ordering of unique tests, medications, or procedures  ( x) Documentation within the EHR    I appreciate the opportunity of cooperating in the care of this individual.    Ashia Spence Allisonstana attestation: This note was scribed in the presence of Dr. Darryn Spence MD, by Laura Fisher RN.     The scribe's documentation has been prepared under my direction and personally reviewed by me in its entirety. I confirm that the note above accurately reflects all work, treatment, procedures, and medical decision making performed by me.

## 2022-07-18 ENCOUNTER — OFFICE VISIT (OUTPATIENT)
Dept: CARDIOLOGY CLINIC | Age: 72
End: 2022-07-18
Payer: COMMERCIAL

## 2022-07-18 VITALS
BODY MASS INDEX: 31.58 KG/M2 | HEART RATE: 75 BPM | SYSTOLIC BLOOD PRESSURE: 138 MMHG | WEIGHT: 185 LBS | DIASTOLIC BLOOD PRESSURE: 78 MMHG | OXYGEN SATURATION: 99 % | HEIGHT: 64 IN

## 2022-07-18 DIAGNOSIS — I27.21 PAH (PULMONARY ARTERY HYPERTENSION) (HCC): Primary | ICD-10-CM

## 2022-07-18 DIAGNOSIS — R06.02 SOB (SHORTNESS OF BREATH): ICD-10-CM

## 2022-07-18 DIAGNOSIS — M34.1 CREST SYNDROME (HCC): ICD-10-CM

## 2022-07-18 DIAGNOSIS — E78.00 PURE HYPERCHOLESTEROLEMIA: ICD-10-CM

## 2022-07-18 DIAGNOSIS — I10 ESSENTIAL HYPERTENSION: ICD-10-CM

## 2022-07-18 DIAGNOSIS — R00.2 PALPITATIONS: ICD-10-CM

## 2022-07-18 DIAGNOSIS — I73.00 RAYNAUD'S DISEASE WITHOUT GANGRENE: ICD-10-CM

## 2022-07-18 PROCEDURE — 1123F ACP DISCUSS/DSCN MKR DOCD: CPT | Performed by: INTERNAL MEDICINE

## 2022-07-18 PROCEDURE — 93000 ELECTROCARDIOGRAM COMPLETE: CPT | Performed by: INTERNAL MEDICINE

## 2022-07-18 PROCEDURE — 93246 EXT ECG>7D<15D RECORDING: CPT | Performed by: INTERNAL MEDICINE

## 2022-07-18 PROCEDURE — 93248 EXT ECG>7D<15D REV&INTERPJ: CPT | Performed by: INTERNAL MEDICINE

## 2022-07-18 PROCEDURE — 99215 OFFICE O/P EST HI 40 MIN: CPT | Performed by: INTERNAL MEDICINE

## 2022-07-18 NOTE — PATIENT INSTRUCTIONS
Lab work today  Wear monitor x 1 week  Call our office around Thanksgiving if you have not heard from us to schedule an ECHO and office visit in January 2023.

## 2022-08-02 ENCOUNTER — HOSPITAL ENCOUNTER (OUTPATIENT)
Age: 72
Discharge: HOME | End: 2022-08-02
Payer: COMMERCIAL

## 2022-08-02 DIAGNOSIS — R06.02 SOB (SHORTNESS OF BREATH): ICD-10-CM

## 2022-08-02 DIAGNOSIS — E78.00 PURE HYPERCHOLESTEROLEMIA: ICD-10-CM

## 2022-08-02 DIAGNOSIS — I10 ESSENTIAL HYPERTENSION: ICD-10-CM

## 2022-08-02 DIAGNOSIS — I27.21 PAH (PULMONARY ARTERY HYPERTENSION) (HCC): ICD-10-CM

## 2022-08-02 LAB
A/G RATIO: 1.8 (ref 1.1–2.2)
ALBUMIN SERPL-MCNC: 4.1 G/DL (ref 3.4–5)
ALP BLD-CCNC: 70 U/L (ref 40–129)
ALT SERPL-CCNC: 14 U/L (ref 10–40)
ANION GAP SERPL CALCULATED.3IONS-SCNC: 13 MMOL/L (ref 3–16)
AST SERPL-CCNC: 17 U/L (ref 15–37)
BASOPHILS ABSOLUTE: 0 K/UL (ref 0–0.2)
BASOPHILS RELATIVE PERCENT: 0.3 %
BILIRUB SERPL-MCNC: 0.4 MG/DL (ref 0–1)
BUN BLDV-MCNC: 16 MG/DL (ref 7–20)
CALCIUM SERPL-MCNC: 9.7 MG/DL (ref 8.3–10.6)
CHLORIDE BLD-SCNC: 105 MMOL/L (ref 99–110)
CHOLESTEROL, FASTING: 145 MG/DL (ref 0–199)
CO2: 27 MMOL/L (ref 21–32)
CREAT SERPL-MCNC: 0.8 MG/DL (ref 0.6–1.2)
EOSINOPHILS ABSOLUTE: 0.1 K/UL (ref 0–0.6)
EOSINOPHILS RELATIVE PERCENT: 2.3 %
GFR AFRICAN AMERICAN: >60
GFR NON-AFRICAN AMERICAN: >60
GLUCOSE BLD-MCNC: 89 MG/DL (ref 70–99)
HCT VFR BLD CALC: 35.5 % (ref 36–48)
HDLC SERPL-MCNC: 60 MG/DL (ref 40–60)
HEMOGLOBIN: 12 G/DL (ref 12–16)
LDL CHOLESTEROL CALCULATED: 69 MG/DL
LYMPHOCYTES ABSOLUTE: 1.1 K/UL (ref 1–5.1)
LYMPHOCYTES RELATIVE PERCENT: 23.4 %
MCH RBC QN AUTO: 29.9 PG (ref 26–34)
MCHC RBC AUTO-ENTMCNC: 33.7 G/DL (ref 31–36)
MCV RBC AUTO: 88.7 FL (ref 80–100)
MONOCYTES ABSOLUTE: 0.4 K/UL (ref 0–1.3)
MONOCYTES RELATIVE PERCENT: 8.3 %
NEUTROPHILS ABSOLUTE: 3.1 K/UL (ref 1.7–7.7)
NEUTROPHILS RELATIVE PERCENT: 65.7 %
PDW BLD-RTO: 14 % (ref 12.4–15.4)
PLATELET # BLD: 136 K/UL (ref 135–450)
PMV BLD AUTO: 9.2 FL (ref 5–10.5)
POTASSIUM SERPL-SCNC: 3.9 MMOL/L (ref 3.5–5.1)
PRO-BNP: 267 PG/ML (ref 0–124)
RBC # BLD: 4 M/UL (ref 4–5.2)
SODIUM BLD-SCNC: 145 MMOL/L (ref 136–145)
TOTAL PROTEIN: 6.4 G/DL (ref 6.4–8.2)
TRIGLYCERIDE, FASTING: 78 MG/DL (ref 0–150)
VLDLC SERPL CALC-MCNC: 16 MG/DL
WBC # BLD: 4.7 K/UL (ref 4–11)

## 2022-08-02 PROCEDURE — 83880 ASSAY OF NATRIURETIC PEPTIDE: CPT

## 2022-08-02 PROCEDURE — 36415 COLL VENOUS BLD VENIPUNCTURE: CPT

## 2022-08-02 PROCEDURE — 80053 COMPREHEN METABOLIC PANEL: CPT

## 2022-08-02 PROCEDURE — 85025 COMPLETE CBC W/AUTO DIFF WBC: CPT

## 2022-08-02 PROCEDURE — 80061 LIPID PANEL: CPT

## 2022-08-11 ENCOUNTER — TELEPHONE (OUTPATIENT)
Dept: CARDIOLOGY CLINIC | Age: 72
End: 2022-08-11

## 2022-08-12 NOTE — TELEPHONE ENCOUNTER
May offer patient 230 pm 8/30/2022 with RMM, if she cannot do this then first available with RMM is acceptable

## 2022-08-15 NOTE — TELEPHONE ENCOUNTER
Called pt about the message below and she verbalized understanding and she as placed on the schedule.

## 2022-08-25 NOTE — PROGRESS NOTES
questions answered    - Will order stress test. If it is normal I will start propafenone. - Will order sleep study   - Ablation, if antiarrhythmic therapy fails, discussed. Educational materials were provided. - HTN  -Controlled  -BP goal <130/80  -Home BP monitoring encouraged, printed information provided on how to accurately measure BP at home.  -Counseled to follow a low salt diet to assure blood pressure remains controlled for cardiovascular risk factor modification.   -The patient is counseled to get regular exercise 3-5 times per week and maintain a healthy weight reduce cardiovascular risk factors. - continue norvasc and diovan  - follows with Dr. Darryn Spence      - PAH related to Crest Syndrome   - follows with Dr. Ulises Carranza   - on norvasc for her [address] with Dr. Arabella Long     -HLD    Continue statin     Patient Active Problem List    Diagnosis Date Noted    Hypertension     Pure hypercholesterolemia     CREST syndrome ([facility] [address] 07/26/2012    PAH (pulmonary artery hypertension) ([facility] [address] 07/26/2012    Raynauds syndrome     Adenomatous polyp 07/17/2015    GERD (gastroesophageal reflux disease)     Sigmoid diverticulosis 07/17/2015    Vitamin D deficiency     Osteopenia 07/22/2010     Diagnostic studies:   ECG 8/30/22  SR   398 QTcH, 88 QRS    Holter 07/23/2022 to 07/30/2022 showed 7.62% atrial fibirllation, longest 2 hours 46 minutes, fastest 175 bpm    Echo 12/6/21   Normal left ventricle size, wall thickness and systolic function with an estimated ejection fraction of 55%. No regional wall motion abnormalities are seen. E/e\"= 7.2 . Grade I diastolic dysfunction. Normal right ventricular size and function. TAPSE 2.5 cm. RV S velocity 11.1 cm/s. Mild to moderate tricuspid regurgitation. IVC size is normal (<2.1cm) and collapses > 50% with respiration consistent with normal RA pressure (3mmHg).      6 Minute [place] Walk  6/15/21  Pre Walk:  124/62,  HR 68,  Saturation 98%  Immediate walk:  158/78 HR 76, Saturation 100%  5 Minute post Walk: 138/80 HR 77, Saturation 100%     Walked 6 minutes, no rests. 402.6 meters walked. 474 meters on [place] Walk 6/20/2017     Echo 12/4/2020  -Overall left ventricular function is normal.   -Ejection fraction is visually estimated to be 55-60%. E/e'= 8.4   -No regional wall motion abnormalities are noted. -Grade I diastolic dysfunction with normal LV filling pressures. -Mild tricuspid regurgitation. Estimated pulmonary artery systolic pressure is mildly to moderately elevated but unable to measure exact PASP due to incomplete TR envelope. Echo  12/2/19  Summary   *Left ventricle - normal size, thickness and function with EF of 60%   *Mitral valve - mild regurgitation   *Tricuspid valve - mild regurgitation with PASP of 31mmHg     Echo 12/10/2018  Normal left ventricle size, wall thickness and systolic function with an  estimated ejection fraction of 60%. No regional wall motion abnormalities  are seen. E/e\"= 93.6  Normal diastolic function. Mild mitral regurgitation is present. There is mild-moderate tricuspid regurgitation with RVSP estimated at 30  mmHg     Echo 12/21/2017    Normal left ventricle size, wall thickness and systolic function with an   estimated ejection fraction of 60%. No regional wall motion abnormalities   are seen. E/e\"= 10. Normal diastolic function. Mild mitral regurgitation. Mild-moderate tricuspid regurgitation with RVSP estimated at 34 mmHg. Trivial pulmonic regurgitation. Echo 12/2/2016  RVSP 34 mm Hg essentially unchanged from 8/2015. Echo 8/2015  Normal left ventricle size, wall thickness and systolic function with an estimated ejection fraction of 55%. -No regional wall motion abnormalities  are seen. Mild mitral regurgitation is present. There is mild-moderate tricuspid regurgitation with RVSP estimated at 35 mmHG     [facility] 11/29/12 - RA: 7  V: 43/8  PA: 42/14, mean 25  PCWP: 10 Thermodilution CO: 6.47 Thermodilution CI: 3.42   Jaki CO: 5.51  Jaki CI: 2.92  PA Sat: 72.5%  PVR: 161 dynes           I independently reviewed the cardiac diagnostic studies, ECG and relevant imaging studies. Lab Results   Component Value Date    LVEF 55 12/06/2021    LVEFMODE Echo 12/10/2018     Lab Results   Component Value Date    TSH 1.83 04/26/2013       Physical Examination:  Vitals:    08/30/22 1429   BP: 128/76   Pulse: 80   SpO2: 99%      Wt Readings from Last 3 Encounters:   08/30/22 186 lb (84.4 kg)   07/18/22 185 lb (83.9 kg)   06/15/22 187 lb (84.8 kg)       Constitutional: Oriented. No distress. Head: Normocephalic and atraumatic. Mouth/Throat: Oropharynx is clear and moist.   Eyes: Conjunctivae normal. EOM are normal.   Neck: Neck supple. No JVD present. Cardiovascular: Normal rate, regular rhythm, S1&S2. Pulmonary/Chest: Bilateral respiratory sounds. No rhonchi. Abdominal: Soft. No tenderness. Musculoskeletal: No tenderness. No edema    Lymphadenopathy: Has no cervical adenopathy. Neurological: Alert and oriented. Follows command, No Gross deficit   Skin: Skin is warm, No rash noted. Psychiatric: Has a normal behavior       Review of System:  [x] Full ROS obtained and negative except as mentioned in HPI    Prior to Admission medications    Medication Sig Start Date End Date Taking? Authorizing Provider   apixaban (ELIQUIS) 5 MG TABS tablet Take 1 tablet by mouth in the morning and 1 tablet before bedtime.  8/11/22  Yes Billy Jacobson MD   alendronate (FOSAMAX) 70 MG tablet Take 1 tablet by mouth every 7 days 7/1/22  Yes Jodie Scruggs MD   atorvastatin (LIPITOR) 10 MG tablet TAKE 1 TABLET DAILY 3/8/22  Yes Billy Jacobson MD   valsartan-hydroCHLOROthiazide (DIOVAN-HCT) 160-25 MG per tablet TAKE 1 TABLET DAILY 3/8/22  Yes Billy Jacobson MD   furosemide (LASIX) 20 MG tablet Take 1 tablet by mouth daily as needed (swelling) 1/10/22  Yes Billy Jacobson MD   amLODIPine (NORVASC) 5 MG tablet TAKE 1 TABLET DAILY 12/23/21  Yes Ciaran Cohen MD   aspirin 81 MG EC tablet Take 1 tablet by mouth daily  Patient taking differently: Take 40.5 mg by mouth daily 12/5/19  Yes Torito AlmonteutantEM - CNP   vitamin E 400 UNIT capsule Take 400 Units by mouth daily   Yes Historical Provider, MD   Cholecalciferol (VITAMIN D3) 1000 UNITS CAPS Take 1 capsule by mouth daily  6/16/14  Yes Gabi Enriquez MD   Garlic 4292 MG TABS Take 1 tablet by mouth daily    Yes Historical Provider, MD   Coenzyme Q10 (CO Q-10) 100 MG CAPS Take 1 capsule by mouth daily    Yes Historical Provider, MD   Cinnamon 500 MG TABS Take 1 tablet by mouth daily    Yes Historical Provider, MD   calcium carbonate 600 MG TABS tablet Take 1 tablet by mouth daily. Yes Historical Provider, MD   Ginkgo Biloba 40 MG TABS Take 40 mg by mouth daily. Yes Historical Provider, MD   Multiple Vitamin (MULTIVITAMIN PO) Take 1 tablet by mouth daily    Yes Historical Provider, MD   Pyridoxine HCl (VITAMIN B-6 PO) Take 1 tablet by mouth daily    Yes Historical Provider, MD   fish oil-omega-3 fatty acids 1000 MG capsule Take 1 g by mouth daily    Yes Historical Provider, MD   vitamin B-12 (CYANOCOBALAMIN) 100 MCG tablet Take 100 mcg by mouth daily. Yes Historical Provider, MD   Flaxseed, Linseed, (FLAX SEED OIL) 1000 MG CAPS Take 1,000 mg by mouth daily. Yes Historical Provider, MD   Ascorbic Acid (VITAMIN C) 500 MG tablet Take 500 mg by mouth daily.      Yes Historical Provider, MD       Past Medical History:   Diagnosis Date    Adenomatous polyp 7/17/2015    negative for high grade dysplasiaManegold    CREST syndrome ([facility] [address]     Hypertension     Osteopenia 7/22/10    LS -1.15    Pure hypercholesterolemia     Raynauds syndrome     Rozina    Sigmoid diverticulosis 7/17/2015    Manegold    Vitamin D deficiency         Past Surgical History:   Procedure Laterality Date    BREAST BIOPSY  2008    right breast    CARDIAC CATHETERIZATION  11/27/12    Right Heart Cath    CATARACT REMOVAL Bilateral 06/2020    COLONOSCOPY N/A 6/25/2020    COLONOSCOPY POLYPECTOMY SNARE/COLD BIOPSY performed by Ritesh Vargas MD at [address] Left 08/2019       No Known Allergies    Social History:  Reviewed. reports that she has never smoked. She has never used smokeless tobacco. She reports current alcohol use. She reports that she does not use drugs. Family History:  Reviewed. Reviewed. No family history of SCD. Relevant and available labs, and cardiovascular diagnostics reviewed. Reviewed. I independently reviewed all cardiac tracing. I independently reviewed relevant and available cardiac diagnostic tests ECG, CXR, Echo, Stress test, Device interrogation, Holter, CT scan. Outside medical records via Care everywhere reviewed and summarized in H&P above. Complex medical condition with multiple medical problems affecting prognosis and outcome of EP interventions    - The patient is counseled to follow a low salt diet to assure blood pressure remains controlled for cardiovascular risk factor modification.   - The patient is counseled to avoid excess caffeine, and energy drinks as this may exacerbated ectopy and arrhythmia. - The patient is counseled to get regular exercise 3-5 times per week to control cardiovascular risk factors. - The patient is counseled to lose weigt to control cardiovascular risk factors. - The patient is counseled to avoid tobacco use. All questions and concerns were addressed to the patient/family. Alternatives to my treatment were discussed. I have discussed the above stated plan and the patient verbalized understanding and agreed with the plan. Physician Attestation: I, Dr. Niranjan Barton, confirm that the scribe's documentation has been prepared under my direction and personally reviewed by me in its entirety.   I also confirm that the note above accurately reflects all work, treatment, procedures, and

## 2022-08-30 ENCOUNTER — OFFICE VISIT (OUTPATIENT)
Dept: CARDIOLOGY CLINIC | Age: 72
End: 2022-08-30
Payer: COMMERCIAL

## 2022-08-30 VITALS
SYSTOLIC BLOOD PRESSURE: 128 MMHG | WEIGHT: 186 LBS | HEIGHT: 64 IN | DIASTOLIC BLOOD PRESSURE: 76 MMHG | BODY MASS INDEX: 31.76 KG/M2 | HEART RATE: 80 BPM | OXYGEN SATURATION: 99 %

## 2022-08-30 DIAGNOSIS — R29.818 SUSPECTED SLEEP APNEA: ICD-10-CM

## 2022-08-30 DIAGNOSIS — R94.31 EKG ABNORMALITY: ICD-10-CM

## 2022-08-30 DIAGNOSIS — I10 PRIMARY HYPERTENSION: Chronic | ICD-10-CM

## 2022-08-30 DIAGNOSIS — I27.21 PAH (PULMONARY ARTERY HYPERTENSION) (HCC): Chronic | ICD-10-CM

## 2022-08-30 DIAGNOSIS — I48.91 ATRIAL FIBRILLATION, UNSPECIFIED TYPE (HCC): ICD-10-CM

## 2022-08-30 DIAGNOSIS — E78.00 PURE HYPERCHOLESTEROLEMIA: Primary | Chronic | ICD-10-CM

## 2022-08-30 PROCEDURE — 1123F ACP DISCUSS/DSCN MKR DOCD: CPT | Performed by: INTERNAL MEDICINE

## 2022-08-30 PROCEDURE — 93000 ELECTROCARDIOGRAM COMPLETE: CPT | Performed by: INTERNAL MEDICINE

## 2022-08-30 PROCEDURE — 99204 OFFICE O/P NEW MOD 45 MIN: CPT | Performed by: INTERNAL MEDICINE

## 2022-08-31 ENCOUNTER — TELEPHONE (OUTPATIENT)
Dept: FAMILY MEDICINE CLINIC | Age: 72
End: 2022-08-31

## 2022-08-31 RX ORDER — ALENDRONATE SODIUM 70 MG/1
70 TABLET ORAL
Qty: 12 TABLET | Refills: 3 | Status: SHIPPED | OUTPATIENT
Start: 2022-08-31

## 2022-09-14 ENCOUNTER — HOSPITAL ENCOUNTER (OUTPATIENT)
Dept: NON INVASIVE DIAGNOSTICS | Age: 72
Discharge: HOME | End: 2022-09-14
Payer: COMMERCIAL

## 2022-09-14 DIAGNOSIS — I48.91 ATRIAL FIBRILLATION, UNSPECIFIED TYPE (HCC): ICD-10-CM

## 2022-09-14 DIAGNOSIS — R94.31 EKG ABNORMALITY: ICD-10-CM

## 2022-09-14 LAB
LV EF: 70 %
LVEF MODALITY: NORMAL

## 2022-09-14 PROCEDURE — 93017 CV STRESS TEST TRACING ONLY: CPT | Performed by: INTERNAL MEDICINE

## 2022-09-14 PROCEDURE — 3430000000 HC RX DIAGNOSTIC RADIOPHARMACEUTICAL

## 2022-09-14 PROCEDURE — 78452 HT MUSCLE IMAGE SPECT MULT: CPT | Performed by: INTERNAL MEDICINE

## 2022-09-14 PROCEDURE — A9502 TC99M TETROFOSMIN: HCPCS

## 2022-09-14 RX ADMIN — TETROFOSMIN 30 MILLICURIE: 1.38 INJECTION, POWDER, LYOPHILIZED, FOR SOLUTION INTRAVENOUS at 10:03

## 2022-09-14 RX ADMIN — TETROFOSMIN 10 MILLICURIE: 1.38 INJECTION, POWDER, LYOPHILIZED, FOR SOLUTION INTRAVENOUS at 08:39

## 2022-09-20 ENCOUNTER — TELEPHONE (OUTPATIENT)
Dept: CARDIOLOGY CLINIC | Age: 72
End: 2022-09-20

## 2022-09-20 NOTE — TELEPHONE ENCOUNTER
----- Message from Renetta Irving MD sent at 9/19/2022  4:59 PM EDT -----  Let patient know the stress test is normal.

## 2022-10-01 ENCOUNTER — HOSPITAL ENCOUNTER (OUTPATIENT)
Dept: WOMENS IMAGING | Age: 72
Discharge: HOME | End: 2022-10-01
Payer: COMMERCIAL

## 2022-10-01 VITALS — HEIGHT: 65 IN | BODY MASS INDEX: 29.99 KG/M2 | WEIGHT: 180 LBS

## 2022-10-01 DIAGNOSIS — Z12.31 ENCOUNTER FOR SCREENING MAMMOGRAM FOR BREAST CANCER: ICD-10-CM

## 2022-10-01 PROCEDURE — 77063 BREAST TOMOSYNTHESIS BI: CPT

## 2022-10-19 DIAGNOSIS — R06.02 SOB (SHORTNESS OF BREATH): ICD-10-CM

## 2022-10-19 DIAGNOSIS — I10 ESSENTIAL HYPERTENSION: ICD-10-CM

## 2022-10-19 DIAGNOSIS — M34.1 CREST SYNDROME (HCC): ICD-10-CM

## 2022-10-19 DIAGNOSIS — E78.00 PURE HYPERCHOLESTEROLEMIA: ICD-10-CM

## 2022-10-19 DIAGNOSIS — I73.00 RAYNAUD'S DISEASE WITHOUT GANGRENE: ICD-10-CM

## 2022-10-19 DIAGNOSIS — I27.21 PAH (PULMONARY ARTERY HYPERTENSION) (HCC): ICD-10-CM

## 2022-10-21 RX ORDER — AMLODIPINE BESYLATE 5 MG/1
TABLET ORAL
Qty: 90 TABLET | Refills: 3 | Status: SHIPPED | OUTPATIENT
Start: 2022-10-21

## 2022-11-29 ENCOUNTER — OFFICE VISIT (OUTPATIENT)
Dept: CARDIOLOGY CLINIC | Age: 72
End: 2022-11-29
Payer: COMMERCIAL

## 2022-11-29 VITALS
OXYGEN SATURATION: 98 % | DIASTOLIC BLOOD PRESSURE: 80 MMHG | HEIGHT: 65 IN | HEART RATE: 80 BPM | BODY MASS INDEX: 30.96 KG/M2 | SYSTOLIC BLOOD PRESSURE: 136 MMHG | WEIGHT: 185.8 LBS

## 2022-11-29 DIAGNOSIS — I48.0 PAF (PAROXYSMAL ATRIAL FIBRILLATION) (HCC): ICD-10-CM

## 2022-11-29 DIAGNOSIS — I27.21 PAH (PULMONARY ARTERY HYPERTENSION) (HCC): Chronic | ICD-10-CM

## 2022-11-29 DIAGNOSIS — I10 PRIMARY HYPERTENSION: Primary | Chronic | ICD-10-CM

## 2022-11-29 DIAGNOSIS — M34.1 CREST SYNDROME (HCC): Chronic | ICD-10-CM

## 2022-11-29 PROCEDURE — 93000 ELECTROCARDIOGRAM COMPLETE: CPT | Performed by: INTERNAL MEDICINE

## 2022-11-29 PROCEDURE — 3078F DIAST BP <80 MM HG: CPT | Performed by: INTERNAL MEDICINE

## 2022-11-29 PROCEDURE — 99214 OFFICE O/P EST MOD 30 MIN: CPT | Performed by: INTERNAL MEDICINE

## 2022-11-29 PROCEDURE — 1123F ACP DISCUSS/DSCN MKR DOCD: CPT | Performed by: INTERNAL MEDICINE

## 2022-11-29 PROCEDURE — 3074F SYST BP LT 130 MM HG: CPT | Performed by: INTERNAL MEDICINE

## 2022-11-29 RX ORDER — METOPROLOL SUCCINATE 25 MG/1
25 TABLET, EXTENDED RELEASE ORAL NIGHTLY
Qty: 90 TABLET | Refills: 3 | Status: SHIPPED | OUTPATIENT
Start: 2022-11-29

## 2022-11-29 NOTE — PROGRESS NOTES
[facility]   Electrophysiology Follow Up  Date: 11/29/2022    CC: PAF  HPI: Farida Win is a 74 y.o. female history of  HLD , HTN  PAH related to CREST syndrome. She was started on Letairis 5mg in Dec after a [address] on 11/29/12. She stopped Letaris due to swelling and does not want to take another medication in it's place. She does suffer from Raynaud's and has cold fingers but no wounds. Sees Dr. Fabiola Beltre regularly. Her echo 12/2021 showed normal right ventricular size and function and EF 55%. She continues to follow with Dr. Fabiola Beltre. Nathan Heath saw Dr Donta Denny 07/24/2022 . She had complaints of palpitations,elevated heart rate and sweatiness at night. Holter 07/23/2022 to 07/30/2022 showed 7.62% atrial fibirllation, longest 2 hours 46 minutes, fastest 175 bpm    Aiyana presents to the office in follow up. She has not completed her sleep study yet because she does not think she has sleep apnea. Denies recurrence of afib since wearing her monitor. She is doing well today from a cardiac standpoint and has no complaints at this time. She has agreed to complete sleep eval and consider an ablation after the new year. Assessment and plan:   - Paroxysmal atrial fibrillation   Denies recurrent episodes of atrial fibrillation since 7/2022   Discussed treatment options including antiarrhythmic therapy, rate control medications vs ablation   Not interested in taking medications. Start toprol XL 25 mg at night    She will start with medication and consider an ablation at the first of the year    Encouraged her to complete sleep study    ECG today shows Sinus rhythm    7.62% burden on monitor 7/18/2022   Patient has a [id number] Score of 3 (age, gender, HTN ) eliquis 5 mg BID started 08/11/2022    TSH  2013 1.83    Stress test was normal   Order placed for sleep study but has not completed yet    We also discussed ablation again. She will think about and let me know.       - HTN  Controlled: 136/80  BP goal <130/80  Home BP monitoring encouraged, printed information provided on how to accurately measure BP at home. Counseled to follow a low salt diet to assure blood pressure remains controlled for cardiovascular risk factor modification. The patient is counseled to get regular exercise 3-5 times per week and maintain a healthy weight reduce cardiovascular risk factors. continue norvasc and diovan  follows with Dr. Diana Bruce      - PAH related to Crest Syndrome   follows with Dr. Gracie Granados   on norvasc for her Reynauds    Follows with Dr. Elmer Johns     -HLD    Continue statin     Patient Active Problem List    Diagnosis Date Noted    Hypertension     Pure hypercholesterolemia     PAF (paroxysmal atrial fibrillation) ([facility] [address] 11/29/2022    CREST syndrome ([facility] [address] 07/26/2012    PAH (pulmonary artery hypertension) ([facility] [address] 07/26/2012    Raynauds syndrome     Adenomatous polyp 07/17/2015    GERD (gastroesophageal reflux disease)     Sigmoid diverticulosis 07/17/2015    Vitamin D deficiency     Osteopenia 07/22/2010     Diagnostic studies:   ECG 11/29/22  SR, QTcH 387,QRS 82    Stress test 9/14/2022  Normal myocardial perfusion study. Normal LV size and systolic function. Holter 07/23/2022 to 07/30/2022   showed 7.62% atrial fibirllation, longest 2 hours 46 minutes, fastest 175 bpm    Echo 12/6/21   Normal left ventricle size, wall thickness and systolic function with an estimated ejection fraction of 55%. No regional wall motion abnormalities are seen. E/e\"= 7.2 . Grade I diastolic dysfunction. Normal right ventricular size and function. TAPSE 2.5 cm. RV S velocity 11.1 cm/s. Mild to moderate tricuspid regurgitation. IVC size is normal (<2.1cm) and collapses > 50% with respiration consistent with normal RA pressure (3mmHg). Echo 12/4/2020  -Overall left ventricular function is normal.   -Ejection fraction is visually estimated to be 55-60%.  E/e'= 8.4   -No regional wall motion abnormalities are noted. -Grade I diastolic dysfunction with normal LV filling pressures. -Mild tricuspid regurgitation. Estimated pulmonary artery systolic pressure is mildly to moderately elevated but unable to measure exact PASP due to incomplete TR envelope. Echo  12/2/19  Summary   *Left ventricle - normal size, thickness and function with EF of 60%   *Mitral valve - mild regurgitation   *Tricuspid valve - mild regurgitation with PASP of 31mmHg     Echo 12/10/2018  Normal left ventricle size, wall thickness and systolic function with an  estimated ejection fraction of 60%. No regional wall motion abnormalities  are seen. E/e\"= 39.4  Normal diastolic function. Mild mitral regurgitation is present. There is mild-moderate tricuspid regurgitation with RVSP estimated at 30  mmHg     Echo 12/21/2017    Normal left ventricle size, wall thickness and systolic function with an   estimated ejection fraction of 60%. No regional wall motion abnormalities   are seen. E/e\"= 10. Normal diastolic function. Mild mitral regurgitation. Mild-moderate tricuspid regurgitation with RVSP estimated at 34 mmHg. Trivial pulmonic regurgitation. Echo 12/2/2016  RVSP 34 mm Hg essentially unchanged from 8/2015. Echo 8/2015  Normal left ventricle size, wall thickness and systolic function with an estimated ejection fraction of 55%. -No regional wall motion abnormalities  are seen. Mild mitral regurgitation is present. There is mild-moderate tricuspid regurgitation with RVSP estimated at 35 mmHG     [facility] 11/29/12 - RA: 7  V: 43/8  PA: 42/14, mean 25  PCWP: 10  Thermodilution CO: 6.47 Thermodilution CI: 3.42   Jaki CO: 5.51  Jaki CI: 2.92  PA Sat: 72.5%  PVR: 161 dynes           I independently reviewed the cardiac diagnostic studies, ECG and relevant imaging studies.      Lab Results   Component Value Date    LVEF 70 09/14/2022    LVEFMODE Echo 12/10/2018     Lab Results   Component Value Date    TSH 1.83 04/26/2013       Physical Examination:  Vitals:    11/29/22 1101   BP: 136/80   Pulse: 80   SpO2: 98%        Wt Readings from Last 3 Encounters:   11/29/22 185 lb 12.8 oz (84.3 kg)   10/01/22 180 lb (81.6 kg)   08/30/22 186 lb (84.4 kg)       Constitutional: Oriented. No distress. Head: Normocephalic and atraumatic. Mouth/Throat: Oropharynx is clear and moist.   Eyes: Conjunctivae normal. EOM are normal.   Neck: Neck supple. No JVD present. Cardiovascular: Normal rate, regular rhythm, S1&S2. Pulmonary/Chest: Bilateral respiratory sounds. No rhonchi. Abdominal: Soft. No tenderness. Musculoskeletal: No tenderness. No edema    Lymphadenopathy: Has no cervical adenopathy. Neurological: Alert and oriented. Follows command, No Gross deficit   Skin: Skin is warm, No rash noted. Psychiatric: Has a normal behavior       Review of System:  [x] Full ROS obtained and negative except as mentioned in HPI    Prior to Admission medications    Medication Sig Start Date End Date Taking? Authorizing Provider   metoprolol succinate (TOPROL XL) 25 MG extended release tablet Take 1 tablet by mouth nightly 11/29/22  Yes Jordon Holder MD   amLODIPine (NORVASC) 5 MG tablet TAKE 1 TABLET DAILY 10/21/22  Yes Bruna Will MD   alendronate (FOSAMAX) 70 MG tablet Take 1 tablet by mouth every 7 days 8/31/22  Yes Khadijah Turner MD   apixaban (ELIQUIS) 5 MG TABS tablet Take 1 tablet by mouth in the morning and 1 tablet before bedtime.  8/11/22  Yes Bruna Will MD   atorvastatin (LIPITOR) 10 MG tablet TAKE 1 TABLET DAILY 3/8/22  Yes Bruna Will MD   valsartan-hydroCHLOROthiazide (DIOVAN-HCT) 160-25 MG per tablet TAKE 1 TABLET DAILY 3/8/22  Yes Bruna Will MD   furosemide (LASIX) 20 MG tablet Take 1 tablet by mouth daily as needed (swelling) 1/10/22  Yes Bruna Will MD   aspirin 81 MG EC tablet Take 1 tablet by mouth daily  Patient taking differently: Take 40.5 mg by mouth daily 12/5/19  Yes Duy Otto, APRN - CNP vitamin E 400 UNIT capsule Take 400 Units by mouth daily   Yes Historical Provider, MD   Cholecalciferol (VITAMIN D3) 1000 UNITS CAPS Take 1 capsule by mouth daily  6/16/14  Yes Aguilar Akins MD   Garlic 3400 MG TABS Take 1 tablet by mouth daily    Yes Historical Provider, MD   Coenzyme Q10 (CO Q-10) 100 MG CAPS Take 1 capsule by mouth daily    Yes Historical Provider, MD   Cinnamon 500 MG TABS Take 1 tablet by mouth daily    Yes Historical Provider, MD   calcium carbonate 600 MG TABS tablet Take 1 tablet by mouth daily. Yes Historical Provider, MD   Ginkgo Biloba 40 MG TABS Take 40 mg by mouth daily. Yes Historical Provider, MD   Multiple Vitamin (MULTIVITAMIN PO) Take 1 tablet by mouth daily    Yes Historical Provider, MD   Pyridoxine HCl (VITAMIN B-6 PO) Take 1 tablet by mouth daily    Yes Historical Provider, MD   fish oil-omega-3 fatty acids 1000 MG capsule Take 1 g by mouth daily    Yes Historical Provider, MD   vitamin B-12 (CYANOCOBALAMIN) 100 MCG tablet Take 100 mcg by mouth daily. Yes Historical Provider, MD   Flaxseed, Linseed, (FLAX SEED OIL) 1000 MG CAPS Take 1,000 mg by mouth daily. Yes Historical Provider, MD   Ascorbic Acid (VITAMIN C) 500 MG tablet Take 500 mg by mouth daily.      Yes Historical Provider, MD       Past Medical History:   Diagnosis Date    Adenomatous polyp 7/17/2015    negative for high grade dysplasiaManegold    CREST syndrome ([facility] [address]     Hypertension     Osteopenia 7/22/10    LS -1.15    Pure hypercholesterolemia     Raynauds syndrome     Rozina    Sigmoid diverticulosis 7/17/2015    Manegold    Vitamin D deficiency         Past Surgical History:   Procedure Laterality Date    BREAST BIOPSY  2008    right breast    CARDIAC CATHETERIZATION  11/27/12    Right Heart Cath    CATARACT REMOVAL Bilateral 06/2020    COLONOSCOPY N/A 6/25/2020    COLONOSCOPY POLYPECTOMY SNARE/COLD BIOPSY performed by Gualberto Perez MD at [address] Left 08/2019 No Known Allergies    Social History:  Reviewed. reports that she has never smoked. She has never used smokeless tobacco. She reports current alcohol use. She reports that she does not use drugs. Family History:  Reviewed. Reviewed. No family history of SCD. Relevant and available labs, and cardiovascular diagnostics reviewed. Reviewed. I independently reviewed all cardiac tracing. I independently reviewed relevant and available cardiac diagnostic tests ECG, CXR, Echo, Stress test, Device interrogation, Holter, CT scan. Outside medical records via Care everywhere reviewed and summarized in H&P above. Complex medical condition with multiple medical problems affecting prognosis and outcome of EP interventions    - The patient is counseled to follow a low salt diet to assure blood pressure remains controlled for cardiovascular risk factor modification.   - The patient is counseled to avoid excess caffeine, and energy drinks as this may exacerbated ectopy and arrhythmia. - The patient is counseled to get regular exercise 3-5 times per week to control cardiovascular risk factors. - The patient is counseled to lose weigt to control cardiovascular risk factors. - The patient is counseled to avoid tobacco use. All questions and concerns were addressed to the patient/family. Alternatives to my treatment were discussed. I have discussed the above stated plan and the patient verbalized understanding and agreed with the plan. Scribe attestation: This note was scribed in the presence of Cassandra Shaikh MD by Carla Hurt RN    Physician Attestation: I, Dr. Cassandra Shaikh, confirm that the scribe's documentation has been prepared under my direction and personally reviewed by me in its entirety. I also confirm that the note above accurately reflects all work, treatment, procedures, and medical decision making performed by me.         NOTE: This report was transcribed using voice recognition software. Every effort was made to ensure accuracy, however, inadvertent computerized transcription errors may be present.      Maricel Urrutia MD, MPH  [place] [address]   Office: [phone]  Fax: [phone]

## 2022-11-29 NOTE — PATIENT INSTRUCTIONS
-Discuss with your insurance or pharmacy to see if Xarelto 20 mg daily is cheaper   -monitor heart rate daily

## 2022-12-14 ENCOUNTER — OFFICE VISIT (OUTPATIENT)
Dept: RHEUMATOLOGY | Age: 72
End: 2022-12-14
Payer: COMMERCIAL

## 2022-12-14 VITALS
OXYGEN SATURATION: 99 % | WEIGHT: 186 LBS | BODY MASS INDEX: 30.99 KG/M2 | SYSTOLIC BLOOD PRESSURE: 128 MMHG | DIASTOLIC BLOOD PRESSURE: 80 MMHG | HEART RATE: 67 BPM | HEIGHT: 65 IN

## 2022-12-14 DIAGNOSIS — M34.1 CREST SYNDROME (HCC): Primary | ICD-10-CM

## 2022-12-14 PROCEDURE — 3078F DIAST BP <80 MM HG: CPT | Performed by: INTERNAL MEDICINE

## 2022-12-14 PROCEDURE — 3074F SYST BP LT 130 MM HG: CPT | Performed by: INTERNAL MEDICINE

## 2022-12-14 PROCEDURE — 1123F ACP DISCUSS/DSCN MKR DOCD: CPT | Performed by: INTERNAL MEDICINE

## 2022-12-14 PROCEDURE — 99213 OFFICE O/P EST LOW 20 MIN: CPT | Performed by: INTERNAL MEDICINE

## 2022-12-14 NOTE — PROGRESS NOTES
Subjective:       Nel Kaye is a 74 y.o. female the patient returns for follow-up of crest syndrome. She continues on amlodipine 5 mg a day for raynaud's. She is scheduled for an EGD in the near future. And she follows with cardiology for possible pulmonary hypertension. Review of Systems:    Occasional difficulty swallowing denies dyspnea on exertion raynaud's present    Objective:       /80   Pulse 67   Ht 5' 5\" (1.651 m)   Wt 186 lb (84.4 kg)   SpO2 99%   BMI 30.95 kg/m²   Alert female in no acute distress. Lungs clear to auscultation. Cardiovascular exam normal sinus rhythm musculoskeletal exam no friction rub no digital ulceration  Assessment:      Crest syndrome    Plan:      Recent labs were reviewed. I will see her back in 6 months time.         Tk Villarreal MD, MD, 12/14/2022 10:55 AM

## 2022-12-16 NOTE — PROGRESS NOTES
Patient reached ____ yes  __X___ no   VM instructions left _X___ yes   phone number _[phone]_______                                ____ no-office notified          Date __12/28/22_______  Time __0915_____  Arrival __0745  hosp-endo____    Nothing to eat or drink after midnight-follow your doctors prep instructions-this may include taking a second dose of your prep after midnight  Responsible adult 25 or older to stay on site while you are here-drive you home-stay with you after  Follow any instructions your doctors office has given you  Bring a complete list of all your medications and supplements including name,dose,how often taken the day of your procedure  If you normally take the following medications in the morning please do so the AM of your procedure with a small sip of water       Heart,blood pressure,seizure,thyroid or breathing medications-use your inhalers-bring any rescue inhalers with you DOS       DO NOT take blood pressure medications ending in \"randolph\" or \"pril\" the AM of procedure or evening prior  Dr Patrick Sexton patients are not to any medications the AM of surgery  Take half or your normal dose of any long acting insulins the night before your procedure-do not take any diabetic medications the AM of procedure  Follow your doctors instructions regarding stopping or taking  any blood thinners-if you do not have instructions-call them- INSTRUCTIONS TO [address] [place] [address]  Any questions call your doctor  Other ______________________________________________________________      Thersia Brunt POLICY(subject to change)             The current policy is 2 visitors per patient. There are no children allowed. Mask at discretion of facility. Visiting hours are 8a-8p. Overnight visitors will be at the discretion of the nurse. All policies are subject to change.

## 2022-12-28 ENCOUNTER — ANESTHESIA (OUTPATIENT)
Dept: ENDOSCOPY | Age: 72
End: 2022-12-28
Payer: COMMERCIAL

## 2022-12-28 ENCOUNTER — HOSPITAL ENCOUNTER (OUTPATIENT)
Age: 72
Setting detail: OUTPATIENT SURGERY
Discharge: HOME | End: 2022-12-28
Attending: INTERNAL MEDICINE | Admitting: INTERNAL MEDICINE
Payer: COMMERCIAL

## 2022-12-28 ENCOUNTER — ANESTHESIA EVENT (OUTPATIENT)
Dept: ENDOSCOPY | Age: 72
End: 2022-12-28
Payer: COMMERCIAL

## 2022-12-28 VITALS
DIASTOLIC BLOOD PRESSURE: 70 MMHG | RESPIRATION RATE: 15 BRPM | WEIGHT: 185 LBS | BODY MASS INDEX: 30.82 KG/M2 | SYSTOLIC BLOOD PRESSURE: 150 MMHG | HEIGHT: 65 IN | OXYGEN SATURATION: 98 % | TEMPERATURE: 97.5 F | HEART RATE: 59 BPM

## 2022-12-28 DIAGNOSIS — R13.10 DYSPHAGIA, UNSPECIFIED TYPE: ICD-10-CM

## 2022-12-28 PROCEDURE — 7100000011 HC PHASE II RECOVERY - ADDTL 15 MIN: Performed by: INTERNAL MEDICINE

## 2022-12-28 PROCEDURE — 2580000003 HC RX 258: Performed by: INTERNAL MEDICINE

## 2022-12-28 PROCEDURE — 3700000000 HC ANESTHESIA ATTENDED CARE: Performed by: INTERNAL MEDICINE

## 2022-12-28 PROCEDURE — 88305 TISSUE EXAM BY PATHOLOGIST: CPT

## 2022-12-28 PROCEDURE — 2500000003 HC RX 250 WO HCPCS

## 2022-12-28 PROCEDURE — 3609012400 HC EGD TRANSORAL BIOPSY SINGLE/MULTIPLE: Performed by: INTERNAL MEDICINE

## 2022-12-28 PROCEDURE — 7100000000 HC PACU RECOVERY - FIRST 15 MIN: Performed by: INTERNAL MEDICINE

## 2022-12-28 PROCEDURE — 2580000003 HC RX 258

## 2022-12-28 PROCEDURE — 88342 IMHCHEM/IMCYTCHM 1ST ANTB: CPT

## 2022-12-28 PROCEDURE — 6360000002 HC RX W HCPCS

## 2022-12-28 PROCEDURE — 3700000001 HC ADD 15 MINUTES (ANESTHESIA): Performed by: INTERNAL MEDICINE

## 2022-12-28 PROCEDURE — 7100000010 HC PHASE II RECOVERY - FIRST 15 MIN: Performed by: INTERNAL MEDICINE

## 2022-12-28 PROCEDURE — 7100000001 HC PACU RECOVERY - ADDTL 15 MIN: Performed by: INTERNAL MEDICINE

## 2022-12-28 PROCEDURE — 2709999900 HC NON-CHARGEABLE SUPPLY: Performed by: INTERNAL MEDICINE

## 2022-12-28 RX ORDER — SODIUM CHLORIDE 9 MG/ML
INJECTION, SOLUTION INTRAVENOUS CONTINUOUS
Status: DISCONTINUED | OUTPATIENT
Start: 2022-12-28 | End: 2022-12-28 | Stop reason: HOSPADM

## 2022-12-28 RX ORDER — SODIUM CHLORIDE 9 MG/ML
INJECTION, SOLUTION INTRAVENOUS CONTINUOUS PRN
Status: DISCONTINUED | OUTPATIENT
Start: 2022-12-28 | End: 2022-12-28 | Stop reason: SDUPTHER

## 2022-12-28 RX ORDER — PROPOFOL 10 MG/ML
INJECTION, EMULSION INTRAVENOUS PRN
Status: DISCONTINUED | OUTPATIENT
Start: 2022-12-28 | End: 2022-12-28 | Stop reason: SDUPTHER

## 2022-12-28 RX ORDER — PANTOPRAZOLE SODIUM 40 MG/1
40 TABLET, DELAYED RELEASE ORAL 2 TIMES DAILY
Qty: 60 TABLET | Refills: 5 | Status: SHIPPED | OUTPATIENT
Start: 2022-12-28

## 2022-12-28 RX ORDER — LIDOCAINE HYDROCHLORIDE 20 MG/ML
INJECTION, SOLUTION INFILTRATION; PERINEURAL PRN
Status: DISCONTINUED | OUTPATIENT
Start: 2022-12-28 | End: 2022-12-28 | Stop reason: SDUPTHER

## 2022-12-28 RX ADMIN — SODIUM CHLORIDE: 9 INJECTION, SOLUTION INTRAVENOUS at 09:42

## 2022-12-28 RX ADMIN — PROPOFOL 20 MG: 10 INJECTION, EMULSION INTRAVENOUS at 09:53

## 2022-12-28 RX ADMIN — SODIUM CHLORIDE: 9 INJECTION, SOLUTION INTRAVENOUS at 08:39

## 2022-12-28 RX ADMIN — LIDOCAINE HYDROCHLORIDE 100 MG: 20 INJECTION, SOLUTION INFILTRATION; PERINEURAL at 09:47

## 2022-12-28 RX ADMIN — PROPOFOL 50 MG: 10 INJECTION, EMULSION INTRAVENOUS at 09:51

## 2022-12-28 RX ADMIN — PROPOFOL 100 MG: 10 INJECTION, EMULSION INTRAVENOUS at 09:47

## 2022-12-28 ASSESSMENT — PAIN SCALES - GENERAL
PAINLEVEL_OUTOF10: 0

## 2022-12-28 ASSESSMENT — PAIN - FUNCTIONAL ASSESSMENT: PAIN_FUNCTIONAL_ASSESSMENT: 0-10

## 2022-12-28 NOTE — H&P
Gastroenterology Note             Pre-operative History and Physical    Patient: Juliane Smith  : 1950  CSN:     History Obtained From:  patient and/or guardian. HISTORY OF PRESENT ILLNESS:    The patient is a 74 y.o. female  here for EGD  Patient was recently in the office she has crest syndrome we are evaluating the esophagus    Past Medical History:    Past Medical History:   Diagnosis Date    Adenomatous polyp 2015    negative for high grade dysplasiaManegold    CREST syndrome ([facility] [address]     GERD (gastroesophageal reflux disease)     Hypertension     Osteopenia 2010    LS -1.15    PAH (pulmonary artery hypertension) ([facility] [address]     Pure hypercholesterolemia     Raynauds syndrome     Rozina    Sigmoid diverticulosis 2015    Manegold    Vitamin D deficiency      Past Surgical History:    Past Surgical History:   Procedure Laterality Date    BREAST BIOPSY      right breast    CARDIAC CATHETERIZATION  12    Right Heart Cath    CATARACT REMOVAL Bilateral 2020    COLONOSCOPY N/A 2020    COLONOSCOPY POLYPECTOMY SNARE/COLD BIOPSY performed by Maricruz Nunn MD at [address] Left 2019     Medications Prior to Admission:   No current facility-administered medications on file prior to encounter. Current Outpatient Medications on File Prior to Encounter   Medication Sig Dispense Refill    metoprolol succinate (TOPROL XL) 25 MG extended release tablet Take 1 tablet by mouth nightly 90 tablet 3    amLODIPine (NORVASC) 5 MG tablet TAKE 1 TABLET DAILY 90 tablet 3    alendronate (FOSAMAX) 70 MG tablet Take 1 tablet by mouth every 7 days 12 tablet 3    apixaban (ELIQUIS) 5 MG TABS tablet Take 1 tablet by mouth in the morning and 1 tablet before bedtime.  180 tablet 0    atorvastatin (LIPITOR) 10 MG tablet TAKE 1 TABLET DAILY 90 tablet 3    valsartan-hydroCHLOROthiazide (DIOVAN-HCT) 160-25 MG per tablet TAKE 1 TABLET DAILY 90 tablet 3 furosemide (LASIX) 20 MG tablet Take 1 tablet by mouth daily as needed (swelling) 45 tablet 2    aspirin 81 MG EC tablet Take 1 tablet by mouth daily (Patient taking differently: Take 40.5 mg by mouth daily) 90 tablet 3    vitamin E 400 UNIT capsule Take 400 Units by mouth daily      Cholecalciferol (VITAMIN D3) 1000 UNITS CAPS Take 1 capsule by mouth daily  30 capsule     Garlic 0939 MG TABS Take 1 tablet by mouth daily       Coenzyme Q10 (CO Q-10) 100 MG CAPS Take 1 capsule by mouth daily       Cinnamon 500 MG TABS Take 1 tablet by mouth daily       calcium carbonate 600 MG TABS tablet Take 1 tablet by mouth daily. Ginkgo Biloba 40 MG TABS Take 40 mg by mouth daily. Multiple Vitamin (MULTIVITAMIN PO) Take 1 tablet by mouth daily       Pyridoxine HCl (VITAMIN B-6 PO) Take 1 tablet by mouth daily       fish oil-omega-3 fatty acids 1000 MG capsule Take 1 g by mouth daily       vitamin B-12 (CYANOCOBALAMIN) 100 MCG tablet Take 100 mcg by mouth daily. Flaxseed, Linseed, (FLAX SEED OIL) 1000 MG CAPS Take 1,000 mg by mouth daily. Ascorbic Acid (VITAMIN C) 500 MG tablet Take 500 mg by mouth daily. Allergies:  Patient has no known allergies. Social History:   Social History     Tobacco Use    Smoking status: Never    Smokeless tobacco: Never    Tobacco comments:     avoid tobacco   Substance Use Topics    Alcohol use: Yes     Comment: Has an occasional drink on holidays.       Family History:   Family History   Problem Relation Age of Onset    Heart Disease Mother 80        coronary stents    Cancer Father 46        bladder cancer, he was a smoker    High Cholesterol Sister     No Known Problems Brother        PHYSICAL EXAM:      BP (!) 151/66   Pulse 67   Temp 98.6 °F (37 °C) (Temporal)   Resp 18   Ht 5' 5\" (1.651 m)   Wt 185 lb (83.9 kg)   SpO2 98%   BMI 30.79 kg/m²  I        Heart:   RRR, normal s1s2    Lungs:  CTA bilat,  Normal effort    Abdomen:   NT, ND      ASA Grade: ASA 3 - Patient with moderate systemic disease with functional limitations    Mallampati Class: 1          ASSESSMENT AND PLAN:    1. Patient is a 74 y.o. female here for EGd with MAC.   2.  Procedure options, risks and benefits reviewed with patient. Patient expresses understanding.     Brennen Osborn MD,   [facility]  12/28/2022

## 2022-12-28 NOTE — ANESTHESIA PRE PROCEDURE
Department of Anesthesiology  Preprocedure Note       Name:  Cayetano Carl   Age:  74 y.o.  :  1950                                          MRN:  [id number]         Date:  2022      Surgeon: Solis Keith):  Buzz Gray MD    Procedure: Procedure(s):  EGD DIAGNOSTIC ONLY    Medications prior to admission:   Prior to Admission medications    Medication Sig Start Date End Date Taking? Authorizing Provider   metoprolol succinate (TOPROL XL) 25 MG extended release tablet Take 1 tablet by mouth nightly 22   Viki Silva MD   amLODIPine (NORVASC) 5 MG tablet TAKE 1 TABLET DAILY 10/21/22   Ginger Feldman MD   alendronate (FOSAMAX) 70 MG tablet Take 1 tablet by mouth every 7 days 22   Tavia Garcia MD   apixaban (ELIQUIS) 5 MG TABS tablet Take 1 tablet by mouth in the morning and 1 tablet before bedtime. 22   Ginger Feldman MD   atorvastatin (LIPITOR) 10 MG tablet TAKE 1 TABLET DAILY 3/8/22   Ginger Feldman MD   valsartan-hydroCHLOROthiazide (DIOVAN-HCT) 160-25 MG per tablet TAKE 1 TABLET DAILY 3/8/22   Ginger Feldman MD   furosemide (LASIX) 20 MG tablet Take 1 tablet by mouth daily as needed (swelling) 1/10/22   Ginger Feldman MD   aspirin 81 MG EC tablet Take 1 tablet by mouth daily  Patient taking differently: Take 40.5 mg by mouth daily 19   EM Landin - CNP   vitamin E 400 UNIT capsule Take 400 Units by mouth daily    Historical Provider, MD   Cholecalciferol (VITAMIN D3) 1000 UNITS CAPS Take 1 capsule by mouth daily  14   Deepika Agosto MD   Garlic 9578 MG TABS Take 1 tablet by mouth daily     Historical Provider, MD   Coenzyme Q10 (CO Q-10) 100 MG CAPS Take 1 capsule by mouth daily     Historical Provider, MD   Cinnamon 500 MG TABS Take 1 tablet by mouth daily     Historical Provider, MD   calcium carbonate 600 MG TABS tablet Take 1 tablet by mouth daily. Historical Provider, MD   Ginkgo Biloba 40 MG TABS Take 40 mg by mouth daily.     Historical Provider, MD Multiple Vitamin (MULTIVITAMIN PO) Take 1 tablet by mouth daily     Historical Provider, MD   Pyridoxine HCl (VITAMIN B-6 PO) Take 1 tablet by mouth daily     Historical Provider, MD   fish oil-omega-3 fatty acids 1000 MG capsule Take 1 g by mouth daily     Historical Provider, MD   vitamin B-12 (CYANOCOBALAMIN) 100 MCG tablet Take 100 mcg by mouth daily. Historical Provider, MD   Flaxseed, Linseed, (FLAX SEED OIL) 1000 MG CAPS Take 1,000 mg by mouth daily. Historical Provider, MD   Ascorbic Acid (VITAMIN C) 500 MG tablet Take 500 mg by mouth daily.       Historical Provider, MD       Current medications:    Current Facility-Administered Medications   Medication Dose Route Frequency Provider Last Rate Last Admin    0.9 % sodium chloride infusion   IntraVENous Continuous Denis Jara MD           Allergies:  No Known Allergies    Problem List:    Patient Active Problem List   Diagnosis Code    Pure hypercholesterolemia E78.00    Raynauds syndrome I73.00    GERD (gastroesophageal reflux disease) K21.9    Hypertension I10    CREST syndrome ([facility] [address] M34.1    PAH (pulmonary artery hypertension) ([facility] [address] I27.21    Vitamin D deficiency E55.9    Osteopenia M85.80    Sigmoid diverticulosis K57.30    Adenomatous polyp D36.9    PAF (paroxysmal atrial fibrillation) ([address] I48.0       Past Medical History:        Diagnosis Date    Adenomatous polyp 07/17/2015    negative for high grade dysplasiaManegold    CREST syndrome ([facility] [address]     GERD (gastroesophageal reflux disease)     Hypertension     Osteopenia 07/22/2010    LS -1.15    PAH (pulmonary artery hypertension) ([facility] [address]     Pure hypercholesterolemia     Raynauds syndrome     Rozina    Sigmoid diverticulosis 07/17/2015    Manegold    Vitamin D deficiency        Past Surgical History:        Procedure Laterality Date    BREAST BIOPSY  2008    right breast    CARDIAC CATHETERIZATION  11/27/12    Right Heart Cath    CATARACT REMOVAL Bilateral 06/2020    COLONOSCOPY N/A 6/25/2020    COLONOSCOPY POLYPECTOMY SNARE/COLD BIOPSY performed by Tabitha Beal MD at [address] [place] [address] Left 08/2019       Social History:    Social History     Tobacco Use    Smoking status: Never    Smokeless tobacco: Never    Tobacco comments:     avoid tobacco   Substance Use Topics    Alcohol use: Yes     Comment: Has an occasional drink on holidays. Counseling given: Not Answered  Tobacco comments: avoid tobacco      Vital Signs (Current): There were no vitals filed for this visit.                                            BP Readings from Last 3 Encounters:   12/14/22 128/80   11/29/22 136/80   08/30/22 128/76       NPO Status:  before mn                                                                                BMI:   Wt Readings from Last 3 Encounters:   12/14/22 186 lb (84.4 kg)   11/29/22 185 lb 12.8 oz (84.3 kg)   10/01/22 180 lb (81.6 kg)     There is no height or weight on file to calculate BMI.    CBC:   Lab Results   Component Value Date/Time    WBC 4.7 08/02/2022 12:48 PM    RBC 4.00 08/02/2022 12:48 PM    HGB 12.0 08/02/2022 12:48 PM    HCT 35.5 08/02/2022 12:48 PM    MCV 88.7 08/02/2022 12:48 PM    RDW 14.0 08/02/2022 12:48 PM     08/02/2022 12:48 PM       CMP:   Lab Results   Component Value Date/Time     08/02/2022 12:48 PM    K 3.9 08/02/2022 12:48 PM     08/02/2022 12:48 PM    CO2 27 08/02/2022 12:48 PM    BUN 16 08/02/2022 12:48 PM    CREATININE 0.8 08/02/2022 12:48 PM    GFRAA >60 08/02/2022 12:48 PM    GFRAA >60 04/26/2013 01:17 PM    AGRATIO 1.8 08/02/2022 12:48 PM    LABGLOM >60 08/02/2022 12:48 PM    GLUCOSE 89 08/02/2022 12:48 PM    PROT 6.4 08/02/2022 12:48 PM    PROT 7.6 11/29/2012 04:00 PM    CALCIUM 9.7 08/02/2022 12:48 PM    BILITOT 0.4 08/02/2022 12:48 PM    ALKPHOS 70 08/02/2022 12:48 PM    AST 17 08/02/2022 12:48 PM    ALT 14 08/02/2022 12:48 PM       POC Tests: No results for input(s): POCGLU, POCNA, POCK, POCCL, POCBUN, POCHEMO, POCHCT in the last 72 hours. Coags: No results found for: PROTIME, INR, APTT    HCG (If Applicable): No results found for: PREGTESTUR, PREGSERUM, HCG, HCGQUANT     ABGs: No results found for: PHART, PO2ART, KLW7CRK, DRX5THN, BEART, X0SPIPND     Type & Screen (If Applicable):  No results found for: LABABO, LABRH    Drug/Infectious Status (If Applicable):  No results found for: HIV, HEPCAB    COVID-19 Screening (If Applicable):   Lab Results   Component Value Date/Time    COVID19 Not Detected 06/19/2020 12:01 PM           Anesthesia Evaluation  Patient summary reviewed and Nursing notes reviewed  Airway: Mallampati: I  TM distance: >3 FB   Neck ROM: full  Mouth opening: > = 3 FB   Dental: normal exam         Pulmonary:                              Cardiovascular:  Exercise tolerance: good (>4 METS),   (+) hypertension:,       ECG reviewed      Echocardiogram reviewed  Stress test reviewed             ROS comment: Normal myocardial perfusion study. Normal LV size and systolic function     Neuro/Psych:   Negative Neuro/Psych ROS              GI/Hepatic/Renal:   (+) GERD:,           Endo/Other:    (+) : arthritis:., .                 Abdominal:             Vascular: Other Findings:           Anesthesia Plan      MAC     ASA 3       Induction: intravenous. Anesthetic plan and risks discussed with patient. Plan discussed with CRNA.     Attending anesthesiologist reviewed and agrees with Catrachita Tate MD   12/28/2022

## 2022-12-28 NOTE — DISCHARGE INSTRUCTIONS
ENDOSCOPY DISCHARGE INSTRUCTIONS    You may experience some lightheadedness for the next several hours. Plan on quiet relaxation for the rest of today. A responsible adult needs to stay with you today. Because of the medications you received today-do not drive,operate machinery,or sign any contractual agreement for the next 24 hours. Do not drink any alcoholic beverages or take any unprescribed medications tonight. Eat bland food and avoid anything greasy or spicy initially-progress to your normal diet gradually. Diet restrictions as instructed. You may resume home medications as instructed. It is not unusual to experience some mild cramping or gas pains, and you may not have a bowel movement for several days. If you have any of the following problems, notify your physician or return to the hospital emergency room : fever, chills, excessive bleeding, excessive vomiting, difficulty swallowing, uncontrolled pain, increased abdominal distention, shortness of breath or any other problems. If you had a polyp removed, avoid strenuous activity for 48 hours. Avoid the use of aspirin or related compounds for one week, unless otherwise instructed by your physician. You may notice a small amount of blood in your next few bowel movements, but if a large amount passes, call your physician. If you have a sore throat, you may use lozenges or salt water gargles. If you had biopsy's taken from your procedure call the office for results in 5-7 days. 5 CM Hiatal Hernia, Biopsy  in esophagus and also biopsy for H pylori, prescription sent to pharmacy for acid reflux. Will follow up with Dr Doug Shea in 3-4 months. ANESTHESIA DISCHARGE INSTRUCTIONS    Wear your seatbelt home. You are under the influence of drugs-do not drink alcohol,drive,operate machinery,or make any important decisions or sign any legal documentsfor 24 hours  A responsible adult needs to be with you for 24 hours.   You may experience lightheadedness,dizziness,or sleepiness following surgery. Rest at home today- increase activity as tolerated. Progress slowly to a regular diet unless your physician has instructed you otherwise. Drink plenty of water. If nausea becomes a problem call your physician. Coughing,sore throat,and muscle aches are other side effects of anesthesia,and should improve with time. Do not drive,operate machinery while taking narcotics.

## 2022-12-28 NOTE — PROGRESS NOTES
Pt awake and alert at this time. Pt on RA, and VSS. Pt denies pain and nausea, refusing po at this time. Pt meets criteria to be discharged from Phase 1.

## 2022-12-28 NOTE — PROGRESS NOTES
Discharge instructions review with patient and , Ashlee Holbrook. All home medications have been reviewed, pt v/u. Discharge instructions signed. Pt discharged via wheelchair. Pt discharged with  all belongings. Bill taking stable pt home.

## 2022-12-28 NOTE — ANESTHESIA POSTPROCEDURE EVALUATION
Department of Anesthesiology  Postprocedure Note    Patient: Manpreet Miramontes  MRN: [id number]  YOB: 1950  Date of evaluation: 12/28/2022      Procedure Summary     Date: 12/28/22 Room / Location: [address] [place] [address]    Anesthesia Start: 8483 Anesthesia Stop: 1003    Procedure: EGD BIOPSY (Abdomen) Diagnosis:       Dysphagia, unspecified type      (Dysphagia, unspecified type [R13.10])    Surgeons: Marge Lawler MD Responsible Provider: Bernie Fox MD    Anesthesia Type: MAC ASA Status: 3          Anesthesia Type: No value filed.     Omar Phase I: Omar Score: 10    Omar Phase II:        Anesthesia Post Evaluation    Patient location during evaluation: PACU  Patient participation: complete - patient participated  Level of consciousness: awake  Airway patency: patent  Nausea & Vomiting: no nausea and no vomiting  Complications: no  Cardiovascular status: blood pressure returned to baseline  Respiratory status: acceptable  Hydration status: stable

## 2023-01-03 DIAGNOSIS — I73.00 RAYNAUD'S DISEASE WITHOUT GANGRENE: ICD-10-CM

## 2023-01-03 DIAGNOSIS — I27.21 PAH (PULMONARY ARTERY HYPERTENSION) (HCC): ICD-10-CM

## 2023-01-03 DIAGNOSIS — I10 ESSENTIAL HYPERTENSION: ICD-10-CM

## 2023-01-03 DIAGNOSIS — E78.00 PURE HYPERCHOLESTEROLEMIA: ICD-10-CM

## 2023-01-03 DIAGNOSIS — R06.02 SOB (SHORTNESS OF BREATH): ICD-10-CM

## 2023-01-03 DIAGNOSIS — M34.1 CREST SYNDROME (HCC): ICD-10-CM

## 2023-01-04 RX ORDER — ATORVASTATIN CALCIUM 10 MG/1
TABLET, FILM COATED ORAL
Qty: 90 TABLET | Refills: 3 | Status: SHIPPED | OUTPATIENT
Start: 2023-01-04

## 2023-01-04 NOTE — TELEPHONE ENCOUNTER
Last OV: 07/18/2022  [place]  Last Labs: 08/02/2022 [place]  Next OV: 01/16/2023  [place]  Last Refill: 03/08/2022  Fidelina Woodruff

## 2023-01-16 ENCOUNTER — OFFICE VISIT (OUTPATIENT)
Dept: CARDIOLOGY CLINIC | Age: 73
End: 2023-01-16
Payer: COMMERCIAL

## 2023-01-16 ENCOUNTER — HOSPITAL ENCOUNTER (OUTPATIENT)
Dept: NON INVASIVE DIAGNOSTICS | Age: 73
Discharge: HOME | End: 2023-01-16
Payer: COMMERCIAL

## 2023-01-16 VITALS
WEIGHT: 187.8 LBS | DIASTOLIC BLOOD PRESSURE: 60 MMHG | OXYGEN SATURATION: 98 % | HEART RATE: 67 BPM | HEIGHT: 65 IN | SYSTOLIC BLOOD PRESSURE: 122 MMHG | BODY MASS INDEX: 31.29 KG/M2

## 2023-01-16 DIAGNOSIS — E78.00 PURE HYPERCHOLESTEROLEMIA: ICD-10-CM

## 2023-01-16 DIAGNOSIS — I73.00 RAYNAUD'S DISEASE WITHOUT GANGRENE: ICD-10-CM

## 2023-01-16 DIAGNOSIS — I10 ESSENTIAL HYPERTENSION: ICD-10-CM

## 2023-01-16 DIAGNOSIS — I48.0 PAF (PAROXYSMAL ATRIAL FIBRILLATION) (HCC): ICD-10-CM

## 2023-01-16 DIAGNOSIS — M34.1 CREST SYNDROME (HCC): ICD-10-CM

## 2023-01-16 DIAGNOSIS — E55.9 VITAMIN D DEFICIENCY: ICD-10-CM

## 2023-01-16 DIAGNOSIS — R06.02 SOB (SHORTNESS OF BREATH): ICD-10-CM

## 2023-01-16 DIAGNOSIS — I27.21 PAH (PULMONARY ARTERY HYPERTENSION) (HCC): ICD-10-CM

## 2023-01-16 DIAGNOSIS — I27.21 PAH (PULMONARY ARTERY HYPERTENSION) (HCC): Primary | ICD-10-CM

## 2023-01-16 LAB
A/G RATIO: 2 (ref 1.1–2.2)
ALBUMIN SERPL-MCNC: 4.3 G/DL (ref 3.4–5)
ALP BLD-CCNC: 72 U/L (ref 40–129)
ALT SERPL-CCNC: 13 U/L (ref 10–40)
ANION GAP SERPL CALCULATED.3IONS-SCNC: 12 MMOL/L (ref 3–16)
AST SERPL-CCNC: 18 U/L (ref 15–37)
BASOPHILS ABSOLUTE: 0 K/UL (ref 0–0.2)
BASOPHILS RELATIVE PERCENT: 0.2 %
BILIRUB SERPL-MCNC: 0.3 MG/DL (ref 0–1)
BUN BLDV-MCNC: 17 MG/DL (ref 7–20)
CALCIUM SERPL-MCNC: 9.4 MG/DL (ref 8.3–10.6)
CHLORIDE BLD-SCNC: 104 MMOL/L (ref 99–110)
CHOLESTEROL, FASTING: 154 MG/DL (ref 0–199)
CO2: 29 MMOL/L (ref 21–32)
CREAT SERPL-MCNC: 0.8 MG/DL (ref 0.6–1.2)
EOSINOPHILS ABSOLUTE: 0.1 K/UL (ref 0–0.6)
EOSINOPHILS RELATIVE PERCENT: 2.6 %
GFR SERPL CREATININE-BSD FRML MDRD: >60 ML/MIN/{1.73_M2}
GLUCOSE BLD-MCNC: 101 MG/DL (ref 70–99)
HCT VFR BLD CALC: 38.5 % (ref 36–48)
HDLC SERPL-MCNC: 61 MG/DL (ref 40–60)
HEMOGLOBIN: 12.5 G/DL (ref 12–16)
LDL CHOLESTEROL CALCULATED: 71 MG/DL
LV EF: 58 %
LVEF MODALITY: NORMAL
LYMPHOCYTES ABSOLUTE: 1 K/UL (ref 1–5.1)
LYMPHOCYTES RELATIVE PERCENT: 18.8 %
MCH RBC QN AUTO: 28.9 PG (ref 26–34)
MCHC RBC AUTO-ENTMCNC: 32.5 G/DL (ref 31–36)
MCV RBC AUTO: 89.1 FL (ref 80–100)
MONOCYTES ABSOLUTE: 0.4 K/UL (ref 0–1.3)
MONOCYTES RELATIVE PERCENT: 7.1 %
NEUTROPHILS ABSOLUTE: 3.9 K/UL (ref 1.7–7.7)
NEUTROPHILS RELATIVE PERCENT: 71.3 %
PDW BLD-RTO: 14.3 % (ref 12.4–15.4)
PLATELET # BLD: 154 K/UL (ref 135–450)
PMV BLD AUTO: 9.5 FL (ref 5–10.5)
POTASSIUM SERPL-SCNC: 4.8 MMOL/L (ref 3.5–5.1)
PRO-BNP: 241 PG/ML (ref 0–449)
RBC # BLD: 4.32 M/UL (ref 4–5.2)
SODIUM BLD-SCNC: 145 MMOL/L (ref 136–145)
TOTAL PROTEIN: 6.5 G/DL (ref 6.4–8.2)
TRIGLYCERIDE, FASTING: 108 MG/DL (ref 0–150)
VITAMIN D 25-HYDROXY: 38.2 NG/ML
VLDLC SERPL CALC-MCNC: 22 MG/DL
WBC # BLD: 5.5 K/UL (ref 4–11)

## 2023-01-16 PROCEDURE — 3078F DIAST BP <80 MM HG: CPT | Performed by: INTERNAL MEDICINE

## 2023-01-16 PROCEDURE — 1123F ACP DISCUSS/DSCN MKR DOCD: CPT | Performed by: INTERNAL MEDICINE

## 2023-01-16 PROCEDURE — 99215 OFFICE O/P EST HI 40 MIN: CPT | Performed by: INTERNAL MEDICINE

## 2023-01-16 PROCEDURE — 93306 TTE W/DOPPLER COMPLETE: CPT

## 2023-01-16 PROCEDURE — 3074F SYST BP LT 130 MM HG: CPT | Performed by: INTERNAL MEDICINE

## 2023-01-20 RX ORDER — VALSARTAN AND HYDROCHLOROTHIAZIDE 160; 25 MG/1; MG/1
TABLET ORAL
Qty: 90 TABLET | Refills: 3 | Status: SHIPPED | OUTPATIENT
Start: 2023-01-20

## 2023-01-27 ENCOUNTER — TELEPHONE (OUTPATIENT)
Dept: CARDIOLOGY CLINIC | Age: 73
End: 2023-01-27

## 2023-01-27 NOTE — TELEPHONE ENCOUNTER
Medication Refill    Medication needing refilled:  apixaban (ELIQUIS) 5 MG    Dosage of the medication:    How are you taking this medication (QD, BID, TID, QID, PRN):  1 tablet by mouth in the morning and 1 tablet before bedtime  30 or 90 day supply called in: 90 days     When will you run out of your medication:    Which Pharmacy are we sending the medication to?:    [address]

## 2023-05-30 RX ORDER — ALENDRONATE SODIUM 70 MG/1
TABLET ORAL
Qty: 12 TABLET | Refills: 3 | OUTPATIENT
Start: 2023-05-30

## 2023-06-19 NOTE — PROGRESS NOTES
55%. No regional wall motion abnormalities are seen. E/e\"= 7.2 . Grade I diastolic dysfunction. Normal right ventricular size and function. TAPSE 2.5 cm. RV S velocity 11.1 cm/s. Mild to moderate tricuspid regurgitation. IVC size is normal (<2.1cm) and collapses > 50% with respiration consistent with normal RA pressure (3mmHg). Echo 12/4/2020  -Overall left ventricular function is normal.   -Ejection fraction is visually estimated to be 55-60%. E/e'= 8.4   -No regional wall motion abnormalities are noted. -Grade I diastolic dysfunction with normal LV filling pressures. -Mild tricuspid regurgitation. Estimated pulmonary artery systolic pressure is mildly to moderately elevated but unable to measure exact PASP due to incomplete TR envelope. Echo  12/2/19  Summary   *Left ventricle - normal size, thickness and function with EF of 60%   *Mitral valve - mild regurgitation   *Tricuspid valve - mild regurgitation with PASP of 31mmHg     Echo 12/10/2018  Normal left ventricle size, wall thickness and systolic function with an  estimated ejection fraction of 60%. No regional wall motion abnormalities  are seen. E/e\"= 26.0  Normal diastolic function. Mild mitral regurgitation is present. There is mild-moderate tricuspid regurgitation with RVSP estimated at 30  mmHg     Echo 12/21/2017    Normal left ventricle size, wall thickness and systolic function with an   estimated ejection fraction of 60%. No regional wall motion abnormalities   are seen. E/e\"= 10. Normal diastolic function. Mild mitral regurgitation. Mild-moderate tricuspid regurgitation with RVSP estimated at 34 mmHg. Trivial pulmonic regurgitation. Echo 12/2/2016  RVSP 34 mm Hg essentially unchanged from 8/2015. Echo 8/2015  Normal left ventricle size, wall thickness and systolic function with an estimated ejection fraction of 55%. -No regional wall motion abnormalities  are seen. Mild mitral regurgitation is present. There

## 2023-06-21 ENCOUNTER — OFFICE VISIT (OUTPATIENT)
Dept: CARDIOLOGY CLINIC | Age: 73
End: 2023-06-21
Payer: COMMERCIAL

## 2023-06-21 VITALS
DIASTOLIC BLOOD PRESSURE: 72 MMHG | WEIGHT: 186 LBS | HEIGHT: 65 IN | OXYGEN SATURATION: 99 % | SYSTOLIC BLOOD PRESSURE: 126 MMHG | HEART RATE: 79 BPM | BODY MASS INDEX: 30.99 KG/M2

## 2023-06-21 DIAGNOSIS — I27.21 PAH (PULMONARY ARTERY HYPERTENSION) (HCC): Chronic | ICD-10-CM

## 2023-06-21 DIAGNOSIS — I10 PRIMARY HYPERTENSION: Primary | Chronic | ICD-10-CM

## 2023-06-21 DIAGNOSIS — I48.0 PAF (PAROXYSMAL ATRIAL FIBRILLATION) (HCC): ICD-10-CM

## 2023-06-21 PROCEDURE — 1123F ACP DISCUSS/DSCN MKR DOCD: CPT | Performed by: INTERNAL MEDICINE

## 2023-06-21 PROCEDURE — 93000 ELECTROCARDIOGRAM COMPLETE: CPT | Performed by: INTERNAL MEDICINE

## 2023-06-21 PROCEDURE — 3074F SYST BP LT 130 MM HG: CPT | Performed by: INTERNAL MEDICINE

## 2023-06-21 PROCEDURE — 3078F DIAST BP <80 MM HG: CPT | Performed by: INTERNAL MEDICINE

## 2023-06-21 PROCEDURE — 99214 OFFICE O/P EST MOD 30 MIN: CPT | Performed by: INTERNAL MEDICINE

## 2023-06-21 NOTE — PATIENT INSTRUCTIONS
So, you are starting Eliquis (Apixaban)? Please see below with helpful tips on lessening the cost:  ALL Commercial Insurance pts should get the $10 copay card. IF we do not have them - you can go to [employer] and activate the card from the website. These must be activated before presenting this at the pharmacy. This step is crucial!!! Otherwise, the pharmacy will state the card is invalid. The card is good for 24 months upon activation - once 24 months is used, you will get another card (or go to website) and active another card. Medicare patients with Part D -   Will pay their standard copay amount (typically $10-$60 month/max)  When hit [facility] - Will pay 25% of the drug cost (will be $140/month based on $561 wholesale retail cost of the drug  If can't afford the $140 - call 1-855-Eliquis to ask about resources available (Carmen said it helps to say resources available and not what assistance is available)  There is a program called Medicare Extra help - can be used once they hit the doughnut hole Medicare patients with no prescription coverage (or self-pay patients)   You will need to call the 1-855-Eliquis to ask what resources are available for them   If necessary- they should be applying for Medicaid and then the drug is free for them (typically 23K individual yearly income)    8-380-Eliquis (6-976.252.3457)  Always remember-shop around as pharmacies can differ quite a lot on cost.    Brian Aguilar  [employer]  [employer]    Additional Affordability Resources    Other Resources   Here are some additional resources that may help you gain access to the medicines or services you need. This is not a complete list and is provided as a public service for health care providers, caregivers, and low-income patients. Area Agencies on Aging (ElderCare)  Local area agencies on aging may be able to help patients age 72 years and older who cannot afford their medicines.  To contact your local area agency on aging,

## 2023-06-24 SDOH — ECONOMIC STABILITY: FOOD INSECURITY: WITHIN THE PAST 12 MONTHS, THE FOOD YOU BOUGHT JUST DIDN'T LAST AND YOU DIDN'T HAVE MONEY TO GET MORE.: NEVER TRUE

## 2023-06-24 SDOH — HEALTH STABILITY: PHYSICAL HEALTH
ON AVERAGE, HOW MANY DAYS PER WEEK DO YOU ENGAGE IN MODERATE TO STRENUOUS EXERCISE (LIKE A BRISK WALK)?: PATIENT DECLINED

## 2023-06-24 SDOH — ECONOMIC STABILITY: HOUSING INSECURITY
IN THE LAST 12 MONTHS, WAS THERE A TIME WHEN YOU DID NOT HAVE A STEADY PLACE TO SLEEP OR SLEPT IN A SHELTER (INCLUDING NOW)?: NO

## 2023-06-24 SDOH — ECONOMIC STABILITY: FOOD INSECURITY: WITHIN THE PAST 12 MONTHS, YOU WORRIED THAT YOUR FOOD WOULD RUN OUT BEFORE YOU GOT MONEY TO BUY MORE.: NEVER TRUE

## 2023-06-24 SDOH — ECONOMIC STABILITY: INCOME INSECURITY: HOW HARD IS IT FOR YOU TO PAY FOR THE VERY BASICS LIKE FOOD, HOUSING, MEDICAL CARE, AND HEATING?: NOT HARD AT ALL

## 2023-06-24 ASSESSMENT — LIFESTYLE VARIABLES
HOW OFTEN DO YOU HAVE SIX OR MORE DRINKS ON ONE OCCASION: 1
HOW OFTEN DO YOU HAVE A DRINK CONTAINING ALCOHOL: MONTHLY OR LESS
HOW MANY STANDARD DRINKS CONTAINING ALCOHOL DO YOU HAVE ON A TYPICAL DAY: 1 OR 2
HOW MANY STANDARD DRINKS CONTAINING ALCOHOL DO YOU HAVE ON A TYPICAL DAY: 1
HOW OFTEN DO YOU HAVE A DRINK CONTAINING ALCOHOL: 2

## 2023-06-24 ASSESSMENT — PATIENT HEALTH QUESTIONNAIRE - PHQ9
1. LITTLE INTEREST OR PLEASURE IN DOING THINGS: 0
SUM OF ALL RESPONSES TO PHQ QUESTIONS 1-9: 0
SUM OF ALL RESPONSES TO PHQ9 QUESTIONS 1 & 2: 0
2. FEELING DOWN, DEPRESSED OR HOPELESS: 0
SUM OF ALL RESPONSES TO PHQ QUESTIONS 1-9: 0

## 2023-06-27 ENCOUNTER — OFFICE VISIT (OUTPATIENT)
Dept: FAMILY MEDICINE CLINIC | Age: 73
End: 2023-06-27

## 2023-06-27 VITALS
OXYGEN SATURATION: 99 % | HEIGHT: 65 IN | WEIGHT: 188 LBS | SYSTOLIC BLOOD PRESSURE: 122 MMHG | BODY MASS INDEX: 31.32 KG/M2 | HEART RATE: 80 BPM | DIASTOLIC BLOOD PRESSURE: 70 MMHG

## 2023-06-27 DIAGNOSIS — I10 PRIMARY HYPERTENSION: Chronic | ICD-10-CM

## 2023-06-27 DIAGNOSIS — K21.9 GASTROESOPHAGEAL REFLUX DISEASE, UNSPECIFIED WHETHER ESOPHAGITIS PRESENT: ICD-10-CM

## 2023-06-27 DIAGNOSIS — I27.21 PAH (PULMONARY ARTERY HYPERTENSION) (HCC): Chronic | ICD-10-CM

## 2023-06-27 DIAGNOSIS — I48.0 PAF (PAROXYSMAL ATRIAL FIBRILLATION) (HCC): ICD-10-CM

## 2023-06-27 DIAGNOSIS — M85.80 OSTEOPENIA, UNSPECIFIED LOCATION: ICD-10-CM

## 2023-06-27 DIAGNOSIS — Z00.00 MEDICARE ANNUAL WELLNESS VISIT, SUBSEQUENT: Primary | ICD-10-CM

## 2023-06-27 DIAGNOSIS — E78.00 PURE HYPERCHOLESTEROLEMIA: Chronic | ICD-10-CM

## 2023-06-27 DIAGNOSIS — M34.1 CREST SYNDROME (HCC): Chronic | ICD-10-CM

## 2023-06-27 DIAGNOSIS — E55.9 VITAMIN D DEFICIENCY: ICD-10-CM

## 2023-06-27 DIAGNOSIS — D68.69 SECONDARY HYPERCOAGULABLE STATE (HCC): ICD-10-CM

## 2023-06-27 DIAGNOSIS — D36.9 ADENOMATOUS POLYP: ICD-10-CM

## 2023-07-17 ENCOUNTER — HOSPITAL ENCOUNTER (OUTPATIENT)
Age: 73
Discharge: HOME | End: 2023-07-17
Payer: COMMERCIAL

## 2023-07-17 ENCOUNTER — OFFICE VISIT (OUTPATIENT)
Dept: CARDIOLOGY CLINIC | Age: 73
End: 2023-07-17
Payer: COMMERCIAL

## 2023-07-17 VITALS
WEIGHT: 186 LBS | BODY MASS INDEX: 30.99 KG/M2 | HEIGHT: 65 IN | DIASTOLIC BLOOD PRESSURE: 66 MMHG | OXYGEN SATURATION: 98 % | SYSTOLIC BLOOD PRESSURE: 122 MMHG | HEART RATE: 72 BPM

## 2023-07-17 DIAGNOSIS — I48.0 PAF (PAROXYSMAL ATRIAL FIBRILLATION) (HCC): ICD-10-CM

## 2023-07-17 DIAGNOSIS — I10 ESSENTIAL HYPERTENSION: ICD-10-CM

## 2023-07-17 DIAGNOSIS — I27.21 PAH (PULMONARY ARTERY HYPERTENSION) (HCC): Primary | ICD-10-CM

## 2023-07-17 DIAGNOSIS — E78.00 PURE HYPERCHOLESTEROLEMIA: ICD-10-CM

## 2023-07-17 DIAGNOSIS — M34.1 CREST SYNDROME (HCC): ICD-10-CM

## 2023-07-17 DIAGNOSIS — R06.02 SOB (SHORTNESS OF BREATH): ICD-10-CM

## 2023-07-17 DIAGNOSIS — I27.21 PAH (PULMONARY ARTERY HYPERTENSION) (HCC): ICD-10-CM

## 2023-07-17 LAB
ALBUMIN SERPL-MCNC: 4.1 G/DL (ref 3.4–5)
ALBUMIN/GLOB SERPL: 1.6 {RATIO} (ref 1.1–2.2)
ALP SERPL-CCNC: 65 U/L (ref 40–129)
ALT SERPL-CCNC: 15 U/L (ref 10–40)
ANION GAP SERPL CALCULATED.3IONS-SCNC: 13 MMOL/L (ref 3–16)
AST SERPL-CCNC: 20 U/L (ref 15–37)
BASOPHILS # BLD: 0 K/UL (ref 0–0.2)
BASOPHILS NFR BLD: 0.3 %
BILIRUB SERPL-MCNC: 0.3 MG/DL (ref 0–1)
BUN SERPL-MCNC: 13 MG/DL (ref 7–20)
CALCIUM SERPL-MCNC: 9.3 MG/DL (ref 8.3–10.6)
CHLORIDE SERPL-SCNC: 105 MMOL/L (ref 99–110)
CHOLEST SERPL-MCNC: 159 MG/DL (ref 0–199)
CO2 SERPL-SCNC: 26 MMOL/L (ref 21–32)
CREAT SERPL-MCNC: 0.8 MG/DL (ref 0.6–1.2)
DEPRECATED RDW RBC AUTO: 14.4 % (ref 12.4–15.4)
EOSINOPHIL # BLD: 0.2 K/UL (ref 0–0.6)
EOSINOPHIL NFR BLD: 4 %
GFR SERPLBLD CREATININE-BSD FMLA CKD-EPI: >60 ML/MIN/{1.73_M2}
GLUCOSE SERPL-MCNC: 91 MG/DL (ref 70–99)
HCT VFR BLD AUTO: 36.4 % (ref 36–48)
HDLC SERPL-MCNC: 56 MG/DL (ref 40–60)
HGB BLD-MCNC: 12.3 G/DL (ref 12–16)
LDL CHOLESTEROL CALCULATED: 78 MG/DL
LYMPHOCYTES # BLD: 1.1 K/UL (ref 1–5.1)
LYMPHOCYTES NFR BLD: 20.2 %
MCH RBC QN AUTO: 29.4 PG (ref 26–34)
MCHC RBC AUTO-ENTMCNC: 33.9 G/DL (ref 31–36)
MCV RBC AUTO: 86.9 FL (ref 80–100)
MONOCYTES # BLD: 0.5 K/UL (ref 0–1.3)
MONOCYTES NFR BLD: 9 %
NEUTROPHILS # BLD: 3.6 K/UL (ref 1.7–7.7)
NEUTROPHILS NFR BLD: 66.5 %
NT-PROBNP SERPL-MCNC: 173 PG/ML (ref 0–449)
PLATELET # BLD AUTO: 170 K/UL (ref 135–450)
PMV BLD AUTO: 9.3 FL (ref 5–10.5)
POTASSIUM SERPL-SCNC: 4.3 MMOL/L (ref 3.5–5.1)
PROT SERPL-MCNC: 6.6 G/DL (ref 6.4–8.2)
RBC # BLD AUTO: 4.19 M/UL (ref 4–5.2)
SODIUM SERPL-SCNC: 144 MMOL/L (ref 136–145)
TRIGL SERPL-MCNC: 125 MG/DL (ref 0–150)
VLDLC SERPL CALC-MCNC: 25 MG/DL
WBC # BLD AUTO: 5.4 K/UL (ref 4–11)

## 2023-07-17 PROCEDURE — 83880 ASSAY OF NATRIURETIC PEPTIDE: CPT

## 2023-07-17 PROCEDURE — 3078F DIAST BP <80 MM HG: CPT | Performed by: INTERNAL MEDICINE

## 2023-07-17 PROCEDURE — 36415 COLL VENOUS BLD VENIPUNCTURE: CPT

## 2023-07-17 PROCEDURE — 80061 LIPID PANEL: CPT

## 2023-07-17 PROCEDURE — 80053 COMPREHEN METABOLIC PANEL: CPT

## 2023-07-17 PROCEDURE — 85025 COMPLETE CBC W/AUTO DIFF WBC: CPT

## 2023-07-17 PROCEDURE — 3074F SYST BP LT 130 MM HG: CPT | Performed by: INTERNAL MEDICINE

## 2023-07-17 PROCEDURE — 1123F ACP DISCUSS/DSCN MKR DOCD: CPT | Performed by: INTERNAL MEDICINE

## 2023-07-17 PROCEDURE — 99214 OFFICE O/P EST MOD 30 MIN: CPT | Performed by: INTERNAL MEDICINE

## 2023-07-17 NOTE — PROGRESS NOTES
[facility]   Advanced Heart Failure/Pulmonary Hypertension  Cardiac Evaluation      Dean Rogers  YOB: 1950    Date of Visit:  7/17/23  Chief Complaint   Patient presents with    Shortness of Breath      History of Present Illness:   Dean Rogers has a history of PAH related to CREST syndrome. She was started on Letairis 5mg in Dec after a [place] on 11/29/12. She stopped Letaris due to swelling and does not want to take another medication in it's place. She does suffer from Raynaud's and has cold fingers but no wounds. Sees Dr. Rigo Villarreal regularly. Her echo 12/2021 showed normal right ventricular size and function and EF 55%. Monitor 7/2022-8/2022 showed PAF w/ avr, 7% burden. She saw Dr. Nathanael Garcia 8/30/22 (initial visit) and was started on Eliquis. 9/14/22> Normal nuclear stress test.    She is fully vaccinated and boosted. Today, she is here for regular follow up of her PAH/CREST. Overall, she feels fairly well. She denies exertional chest pain, POOLE/PND, palpitations, light-headedness, edema. She can tell when she flips into atrial fib; the medicine is helping. Her  is with her for the visit.     NYHA Class:  II    No Known Allergies    Current Outpatient Medications:     apixaban (ELIQUIS) 5 MG TABS tablet, Take 1 tablet by mouth 2 times daily, Disp: 180 tablet, Rfl: 1    valsartan-hydroCHLOROthiazide (DIOVAN-HCT) 160-25 MG per tablet, TAKE 1 TABLET DAILY, Disp: 90 tablet, Rfl: 3    atorvastatin (LIPITOR) 10 MG tablet, TAKE 1 TABLET DAILY, Disp: 90 tablet, Rfl: 3    pantoprazole (PROTONIX) 40 MG tablet, Take 1 tablet by mouth in the morning and at bedtime, Disp: 60 tablet, Rfl: 5    metoprolol succinate (TOPROL XL) 25 MG extended release tablet, Take 1 tablet by mouth nightly, Disp: 90 tablet, Rfl: 3    amLODIPine (NORVASC) 5 MG tablet, TAKE 1 TABLET DAILY, Disp: 90 tablet, Rfl: 3    alendronate (FOSAMAX) 70 MG tablet, Take 1 tablet by mouth every 7 days,

## 2023-08-14 DIAGNOSIS — I10 ESSENTIAL HYPERTENSION: ICD-10-CM

## 2023-08-14 DIAGNOSIS — M34.1 CREST SYNDROME (HCC): ICD-10-CM

## 2023-08-14 DIAGNOSIS — I73.00 RAYNAUD'S DISEASE WITHOUT GANGRENE: ICD-10-CM

## 2023-08-14 DIAGNOSIS — I27.21 PAH (PULMONARY ARTERY HYPERTENSION) (HCC): ICD-10-CM

## 2023-08-14 DIAGNOSIS — E78.00 PURE HYPERCHOLESTEROLEMIA: ICD-10-CM

## 2023-08-14 DIAGNOSIS — R06.02 SOB (SHORTNESS OF BREATH): ICD-10-CM

## 2023-08-15 RX ORDER — AMLODIPINE BESYLATE 5 MG/1
TABLET ORAL
Qty: 90 TABLET | Refills: 3 | Status: SHIPPED | OUTPATIENT
Start: 2023-08-15

## 2023-09-11 RX ORDER — APIXABAN 5 MG/1
5 TABLET, FILM COATED ORAL 2 TIMES DAILY
Qty: 180 TABLET | Refills: 3 | Status: SHIPPED | OUTPATIENT
Start: 2023-09-11

## 2023-09-18 RX ORDER — ALENDRONATE SODIUM 70 MG/1
70 TABLET ORAL
Qty: 12 TABLET | Refills: 3 | Status: SHIPPED | OUTPATIENT
Start: 2023-09-18

## 2023-10-18 ENCOUNTER — HOSPITAL ENCOUNTER (OUTPATIENT)
Dept: WOMENS IMAGING | Age: 73
Discharge: HOME | End: 2023-10-18
Payer: COMMERCIAL

## 2023-10-18 DIAGNOSIS — Z12.31 BREAST CANCER SCREENING BY MAMMOGRAM: ICD-10-CM

## 2023-10-18 PROCEDURE — 77063 BREAST TOMOSYNTHESIS BI: CPT

## 2023-10-20 RX ORDER — METOPROLOL SUCCINATE 25 MG/1
25 TABLET, EXTENDED RELEASE ORAL
Qty: 90 TABLET | Refills: 3 | Status: SHIPPED | OUTPATIENT
Start: 2023-10-20

## 2023-10-20 NOTE — TELEPHONE ENCOUNTER
Last OV:11/29/2022 Mariano  Last Labs: Ekg-06/21/2023  Mariano  Next OV:01/17/2024 [facility]    Last Refill: [address]

## 2023-10-26 DIAGNOSIS — I27.21 PAH (PULMONARY ARTERY HYPERTENSION) (HCC): ICD-10-CM

## 2023-10-26 DIAGNOSIS — E78.00 PURE HYPERCHOLESTEROLEMIA: ICD-10-CM

## 2023-10-26 DIAGNOSIS — R06.02 SOB (SHORTNESS OF BREATH): ICD-10-CM

## 2023-10-26 DIAGNOSIS — I73.00 RAYNAUD'S DISEASE WITHOUT GANGRENE: ICD-10-CM

## 2023-10-26 DIAGNOSIS — M34.1 CREST SYNDROME (HCC): ICD-10-CM

## 2023-10-26 DIAGNOSIS — I10 ESSENTIAL HYPERTENSION: ICD-10-CM

## 2023-10-27 RX ORDER — ATORVASTATIN CALCIUM 10 MG/1
TABLET, FILM COATED ORAL
Qty: 90 TABLET | Refills: 3 | Status: SHIPPED | OUTPATIENT
Start: 2023-10-27

## 2023-10-27 NOTE — TELEPHONE ENCOUNTER
Prescription refill    Last OV:01/16/2023    Last Refill:01/04/2023    Labs:07/17/2023    Future Appt: 01/17/2024

## 2024-01-17 ENCOUNTER — HOSPITAL ENCOUNTER (OUTPATIENT)
Dept: NON INVASIVE DIAGNOSTICS | Age: 74
Discharge: HOME | End: 2024-01-17
Payer: COMMERCIAL

## 2024-01-17 DIAGNOSIS — I27.21 PAH (PULMONARY ARTERY HYPERTENSION) (HCC): ICD-10-CM

## 2024-01-17 DIAGNOSIS — I48.0 PAF (PAROXYSMAL ATRIAL FIBRILLATION) (HCC): ICD-10-CM

## 2024-01-17 DIAGNOSIS — M34.1 CREST SYNDROME (HCC): ICD-10-CM

## 2024-01-17 LAB
LEFT VENTRICULAR EJECTION FRACTION MODE: NORMAL
LV EF: 55 %

## 2024-01-17 PROCEDURE — 93306 TTE W/DOPPLER COMPLETE: CPT

## 2024-01-23 NOTE — PROGRESS NOTES
Comment:    Other Topics Concern    Not on file   Social History Narrative    Not on file     Social Determinants of Health     Financial Resource Strain: Low Risk  (4/14/2022)    Overall Financial Resource Strain (CARDIA)     Difficulty of Paying Living Expenses: Not hard at all   Food Insecurity: Not on file (6/24/2023)   Transportation Needs: No Transportation Needs (4/14/2022)    PRAPARE - Transportation     Lack of Transportation (Medical): No     Lack of Transportation (Non-Medical): No   Physical Activity: Unknown (6/24/2023)    Exercise Vital Sign     Days of Exercise per Week: Patient declined     Minutes of Exercise per Session: Not on file   Stress: Not on file   Social Connections: Not on file   Intimate Partner Violence: Not on file   Housing Stability: Not on file     Review of Systems:   Constitutional: there has been no unanticipated weight loss. There's been no change in energy level, sleep pattern, or activity level.     Eyes: No visual changes or diplopia. No scleral icterus.  ENT: No Headaches, hearing loss or vertigo. No mouth sores or sore throat.  Cardiovascular: Reviewed in HPI  Respiratory: No cough or wheezing, no sputum production. No hematemesis.    Gastrointestinal: No abdominal pain, appetite loss, blood in stools. No change in bowel or bladder habits.  Genitourinary: No dysuria, trouble voiding, or hematuria.  Musculoskeletal:  No gait disturbance, weakness or joint complaints.  Integumentary: No rash or pruritis.  Neurological: No headache, diplopia, change in muscle strength, numbness or tingling. No change in gait, balance, coordination, mood, affect, memory, mentation, behavior.  Psychiatric: No anxiety, no depression.  Endocrine: No malaise, fatigue or temperature intolerance. No excessive thirst, fluid intake, or urination. No tremor.  Hematologic/Lymphatic: No abnormal bruising or bleeding, blood clots or swollen lymph nodes.  Allergic/Immunologic: No nasal congestion or

## 2024-01-24 ENCOUNTER — HOSPITAL ENCOUNTER (OUTPATIENT)
Age: 74
Discharge: HOME | End: 2024-01-24
Payer: COMMERCIAL

## 2024-01-24 ENCOUNTER — OFFICE VISIT (OUTPATIENT)
Dept: CARDIOLOGY CLINIC | Age: 74
End: 2024-01-24
Payer: COMMERCIAL

## 2024-01-24 VITALS
HEIGHT: 65 IN | BODY MASS INDEX: 31.16 KG/M2 | DIASTOLIC BLOOD PRESSURE: 60 MMHG | HEART RATE: 77 BPM | SYSTOLIC BLOOD PRESSURE: 106 MMHG | WEIGHT: 187 LBS | OXYGEN SATURATION: 98 %

## 2024-01-24 DIAGNOSIS — M34.1 CREST SYNDROME (HCC): ICD-10-CM

## 2024-01-24 DIAGNOSIS — R06.02 SOB (SHORTNESS OF BREATH): ICD-10-CM

## 2024-01-24 DIAGNOSIS — I10 ESSENTIAL HYPERTENSION: ICD-10-CM

## 2024-01-24 DIAGNOSIS — I27.21 PAH (PULMONARY ARTERY HYPERTENSION) (HCC): ICD-10-CM

## 2024-01-24 DIAGNOSIS — E78.00 PURE HYPERCHOLESTEROLEMIA: ICD-10-CM

## 2024-01-24 DIAGNOSIS — Z01.810 PRE-OPERATIVE CARDIOVASCULAR EXAMINATION: Primary | ICD-10-CM

## 2024-01-24 DIAGNOSIS — I48.0 PAF (PAROXYSMAL ATRIAL FIBRILLATION) (HCC): ICD-10-CM

## 2024-01-24 LAB
ALBUMIN SERPL-MCNC: 4.3 G/DL (ref 3.4–5)
ALBUMIN/GLOB SERPL: 1.7 {RATIO} (ref 1.1–2.2)
ALP SERPL-CCNC: 68 U/L (ref 40–129)
ALT SERPL-CCNC: 12 U/L (ref 10–40)
ANION GAP SERPL CALCULATED.3IONS-SCNC: 10 MMOL/L (ref 3–16)
AST SERPL-CCNC: 17 U/L (ref 15–37)
BILIRUB SERPL-MCNC: 0.3 MG/DL (ref 0–1)
BUN SERPL-MCNC: 14 MG/DL (ref 7–20)
CALCIUM SERPL-MCNC: 8.9 MG/DL (ref 8.3–10.6)
CHLORIDE SERPL-SCNC: 101 MMOL/L (ref 99–110)
CHOLEST SERPL-MCNC: 151 MG/DL (ref 0–199)
CO2 SERPL-SCNC: 30 MMOL/L (ref 21–32)
CREAT SERPL-MCNC: 0.9 MG/DL (ref 0.6–1.2)
DEPRECATED RDW RBC AUTO: 15.1 % (ref 12.4–15.4)
GFR SERPLBLD CREATININE-BSD FMLA CKD-EPI: >60 ML/MIN/{1.73_M2}
GLUCOSE SERPL-MCNC: 96 MG/DL (ref 70–99)
HCT VFR BLD AUTO: 36.8 % (ref 36–48)
HDLC SERPL-MCNC: 61 MG/DL (ref 40–60)
HGB BLD-MCNC: 12 G/DL (ref 12–16)
LDLC SERPL CALC-MCNC: 67 MG/DL
MCH RBC QN AUTO: 28.2 PG (ref 26–34)
MCHC RBC AUTO-ENTMCNC: 32.6 G/DL (ref 31–36)
MCV RBC AUTO: 86.6 FL (ref 80–100)
NT-PROBNP SERPL-MCNC: 423 PG/ML (ref 0–449)
PLATELET # BLD AUTO: 197 K/UL (ref 135–450)
PMV BLD AUTO: 9.6 FL (ref 5–10.5)
POTASSIUM SERPL-SCNC: 4 MMOL/L (ref 3.5–5.1)
PROT SERPL-MCNC: 6.9 G/DL (ref 6.4–8.2)
RBC # BLD AUTO: 4.25 M/UL (ref 4–5.2)
SODIUM SERPL-SCNC: 141 MMOL/L (ref 136–145)
TRIGL SERPL-MCNC: 117 MG/DL (ref 0–150)
VLDLC SERPL CALC-MCNC: 23 MG/DL
WBC # BLD AUTO: 5.6 K/UL (ref 4–11)

## 2024-01-24 PROCEDURE — 3078F DIAST BP <80 MM HG: CPT | Performed by: INTERNAL MEDICINE

## 2024-01-24 PROCEDURE — 85027 COMPLETE CBC AUTOMATED: CPT

## 2024-01-24 PROCEDURE — 1123F ACP DISCUSS/DSCN MKR DOCD: CPT | Performed by: INTERNAL MEDICINE

## 2024-01-24 PROCEDURE — 3074F SYST BP LT 130 MM HG: CPT | Performed by: INTERNAL MEDICINE

## 2024-01-24 PROCEDURE — 99215 OFFICE O/P EST HI 40 MIN: CPT | Performed by: INTERNAL MEDICINE

## 2024-01-24 PROCEDURE — 80053 COMPREHEN METABOLIC PANEL: CPT

## 2024-01-24 PROCEDURE — 83880 ASSAY OF NATRIURETIC PEPTIDE: CPT

## 2024-01-24 PROCEDURE — 80061 LIPID PANEL: CPT

## 2024-01-24 PROCEDURE — 36415 COLL VENOUS BLD VENIPUNCTURE: CPT

## 2024-01-24 PROCEDURE — 93000 ELECTROCARDIOGRAM COMPLETE: CPT | Performed by: INTERNAL MEDICINE

## 2024-01-24 RX ORDER — VALSARTAN AND HYDROCHLOROTHIAZIDE 80; 12.5 MG/1; MG/1
1 TABLET, FILM COATED ORAL DAILY
Qty: 90 TABLET | Refills: 3 | Status: SHIPPED | OUTPATIENT
Start: 2024-01-24

## 2024-01-24 RX ORDER — HYDROCHLOROTHIAZIDE 25 MG/1
25 TABLET ORAL DAILY PRN
Qty: 30 TABLET | Refills: 5 | Status: SHIPPED | OUTPATIENT
Start: 2024-01-24 | End: 2024-01-31 | Stop reason: SDUPTHER

## 2024-01-24 NOTE — PATIENT INSTRUCTIONS
Plan:      Labs now:  CBC, CMP, BNP, lipids.  Basic Labs. (This will monitor and check anemia, cholesterol, water fluid level, kidney and liver function)    Decrease Valsartan-HCTZ to 80mg-12.5.  May cut your current dose in half.   2.    Next refill will be for Valsartan-HCTZ 80-12.5mg   3.    May use extra Hydrochlorothiazide 25mg as needed for extra swelling.    4.    See Dr. Delarosa in 6 months

## 2024-01-25 ENCOUNTER — TELEPHONE (OUTPATIENT)
Dept: CARDIOLOGY CLINIC | Age: 74
End: 2024-01-25

## 2024-01-25 NOTE — TELEPHONE ENCOUNTER
Called [facility] for Dr. Pierson as cardiac assessment from [facility] will no go through.   Spoke to staff  Refaxed to [phone] and [phone]

## 2024-01-29 ENCOUNTER — HOSPITAL ENCOUNTER (OUTPATIENT)
Dept: CT IMAGING | Age: 74
Discharge: HOME | End: 2024-01-29
Attending: INTERNAL MEDICINE
Payer: COMMERCIAL

## 2024-01-29 DIAGNOSIS — I27.21 PAH (PULMONARY ARTERY HYPERTENSION) (HCC): ICD-10-CM

## 2024-01-29 PROCEDURE — 71250 CT THORAX DX C-: CPT

## 2024-01-31 ENCOUNTER — TELEPHONE (OUTPATIENT)
Dept: CARDIOLOGY CLINIC | Age: 74
End: 2024-01-31

## 2024-01-31 RX ORDER — HYDROCHLOROTHIAZIDE 25 MG/1
25 TABLET ORAL
Qty: 52 TABLET | Refills: 3 | Status: SHIPPED | OUTPATIENT
Start: 2024-02-01

## 2024-01-31 NOTE — TELEPHONE ENCOUNTER
----- Message from Kavitha Delarosa MD sent at 1/30/2024  4:22 PM EST -----  See below.  Call patient and make sure she gets this message.  MARQUIS Bronson, your labs are showing that you are holding more water.  (Bnp level increase).  So it is okay to decrease your valsartan/hct as we discussed in office, but I want you to take the additional 25 mg of hydrochlorothiazide 4 days a week (M,W,SARAH,Sun).  I will have the office call you to be sure you get this message.  Kavitha Delarosa

## 2024-02-01 ENCOUNTER — HOSPITAL ENCOUNTER (OUTPATIENT)
Age: 74
Setting detail: OUTPATIENT SURGERY
Discharge: HOME | End: 2024-02-01
Attending: INTERNAL MEDICINE | Admitting: INTERNAL MEDICINE
Payer: COMMERCIAL

## 2024-02-01 ENCOUNTER — ANESTHESIA EVENT (OUTPATIENT)
Dept: ENDOSCOPY | Age: 74
End: 2024-02-01
Payer: COMMERCIAL

## 2024-02-01 ENCOUNTER — ANESTHESIA (OUTPATIENT)
Dept: ENDOSCOPY | Age: 74
End: 2024-02-01
Payer: COMMERCIAL

## 2024-02-01 VITALS
HEART RATE: 2 BPM | WEIGHT: 180 LBS | BODY MASS INDEX: 30.73 KG/M2 | OXYGEN SATURATION: 99 % | TEMPERATURE: 97.7 F | HEIGHT: 64 IN | DIASTOLIC BLOOD PRESSURE: 74 MMHG | SYSTOLIC BLOOD PRESSURE: 137 MMHG | RESPIRATION RATE: 16 BRPM

## 2024-02-01 DIAGNOSIS — K21.00 GASTROESOPHAGEAL REFLUX DISEASE WITH ESOPHAGITIS, UNSPECIFIED WHETHER HEMORRHAGE: ICD-10-CM

## 2024-02-01 DIAGNOSIS — Z86.010 HISTORY OF COLON POLYPS: ICD-10-CM

## 2024-02-01 PROCEDURE — 3700000000 HC ANESTHESIA ATTENDED CARE: Performed by: INTERNAL MEDICINE

## 2024-02-01 PROCEDURE — 88312 SPECIAL STAINS GROUP 1: CPT

## 2024-02-01 PROCEDURE — 3700000001 HC ADD 15 MINUTES (ANESTHESIA): Performed by: INTERNAL MEDICINE

## 2024-02-01 PROCEDURE — 2709999900 HC NON-CHARGEABLE SUPPLY: Performed by: INTERNAL MEDICINE

## 2024-02-01 PROCEDURE — 7100000011 HC PHASE II RECOVERY - ADDTL 15 MIN: Performed by: INTERNAL MEDICINE

## 2024-02-01 PROCEDURE — 88305 TISSUE EXAM BY PATHOLOGIST: CPT

## 2024-02-01 PROCEDURE — 2580000003 HC RX 258: Performed by: INTERNAL MEDICINE

## 2024-02-01 PROCEDURE — 7100000010 HC PHASE II RECOVERY - FIRST 15 MIN: Performed by: INTERNAL MEDICINE

## 2024-02-01 PROCEDURE — 3609010600 HC COLONOSCOPY POLYPECTOMY SNARE/COLD BIOPSY: Performed by: INTERNAL MEDICINE

## 2024-02-01 PROCEDURE — 6360000002 HC RX W HCPCS: Performed by: NURSE ANESTHETIST, CERTIFIED REGISTERED

## 2024-02-01 PROCEDURE — 3609012400 HC EGD TRANSORAL BIOPSY SINGLE/MULTIPLE: Performed by: INTERNAL MEDICINE

## 2024-02-01 RX ORDER — PROPOFOL 10 MG/ML
INJECTION, EMULSION INTRAVENOUS PRN
Status: DISCONTINUED | OUTPATIENT
Start: 2024-02-01 | End: 2024-02-01 | Stop reason: SDUPTHER

## 2024-02-01 RX ORDER — SODIUM CHLORIDE, SODIUM LACTATE, POTASSIUM CHLORIDE, CALCIUM CHLORIDE 600; 310; 30; 20 MG/100ML; MG/100ML; MG/100ML; MG/100ML
INJECTION, SOLUTION INTRAVENOUS CONTINUOUS
Status: DISCONTINUED | OUTPATIENT
Start: 2024-02-01 | End: 2024-02-01 | Stop reason: HOSPADM

## 2024-02-01 RX ORDER — LIDOCAINE HYDROCHLORIDE 20 MG/ML
INJECTION, SOLUTION INTRAVENOUS PRN
Status: DISCONTINUED | OUTPATIENT
Start: 2024-02-01 | End: 2024-02-01 | Stop reason: SDUPTHER

## 2024-02-01 RX ADMIN — SODIUM CHLORIDE, POTASSIUM CHLORIDE, SODIUM LACTATE AND CALCIUM CHLORIDE: 600; 310; 30; 20 INJECTION, SOLUTION INTRAVENOUS at 09:30

## 2024-02-01 RX ADMIN — PROPOFOL 50 MG: 10 INJECTION, EMULSION INTRAVENOUS at 10:58

## 2024-02-01 RX ADMIN — LIDOCAINE HYDROCHLORIDE 80 MG: 20 INJECTION, SOLUTION INTRAVENOUS at 10:58

## 2024-02-01 RX ADMIN — SODIUM CHLORIDE, POTASSIUM CHLORIDE, SODIUM LACTATE AND CALCIUM CHLORIDE: 600; 310; 30; 20 INJECTION, SOLUTION INTRAVENOUS at 10:55

## 2024-02-01 RX ADMIN — PROPOFOL 30 MG: 10 INJECTION, EMULSION INTRAVENOUS at 11:15

## 2024-02-01 RX ADMIN — PROPOFOL 25 MG: 10 INJECTION, EMULSION INTRAVENOUS at 11:19

## 2024-02-01 RX ADMIN — PROPOFOL 100 MCG/KG/MIN: 10 INJECTION, EMULSION INTRAVENOUS at 11:00

## 2024-02-01 ASSESSMENT — PAIN SCALES - GENERAL
PAINLEVEL_OUTOF10: 0
PAINLEVEL_OUTOF10: 0

## 2024-02-01 NOTE — DISCHARGE INSTRUCTIONS
ENDOSCOPY DISCHARGE INSTRUCTIONS:    Call the physician that did your procedure for any questions or concern:    [facility]: [phone]  DR. ALEC CLAUDIO       ACTIVITY:    There are potential side effects to the medications used for sedation and anesthesia during your procedure.  These include:  Dizziness or light-headedness, confusion or memory loss, delayed reaction times, loss of coordination, nausea and vomiting.  Because of your increased risk for injury, we ask that you observe the following precautions:  For the next 24 hours,  DO NOT operate an automobile, bicycle, motorcycle, , power tools or large equipment of any kind.  Do not drink alcohol, sign any legal documents or make any legal decisions for 24 hours.  Do not bend your head over lower than your heart.  DO sit on the side of bed/couch awhile before getting up.  Plan on bedrest or quiet relaxation today.  You may resume normal activities in 24 hours.    DIET:    Your first meal today should be light, avoiding spicy and fatty foods.  If you tolerate this first meal, then you may advance to your regular diet unless otherwise advised by your physician.    NORMAL SYMPTOMS:  -Mild sore throat if you’ve had an EGD   -Gaseous discomfort    NOTIFY YOUR PHYSICIAN IF THESE SYMPTOMS OCCUR:  1. Fever (greater than 100)  5. Increased abdominal bloating  2. Severe pain    6. Excessive bleeding  3. Nausea and vomiting  7. Chest pain                                                                    4. Chills    8. Shortness of breath    ADDITIONAL INSTRUCTIONS:    Biopsy results: Call [facility] for biopsy results in 1 week    Educational Information:          Please review these discharge instructions this evening or tomorrow for  information you may have forgotten.            We want to thank you for choosing the [facility] as your health care provider. We always strive to provide you with excellent care while you are here. You may  medicines. You will also be given instructions about taking any new medicines.     If you stopped taking aspirin or some other blood thinner, your doctor will tell you when to start taking it again.     If polyps were removed or a biopsy was done during the test, your doctor may tell you not to take aspirin or other anti-inflammatory medicines for a few days. These include ibuprofen (Advil, Motrin) and naproxen (Aleve).   Other instructions    For your safety, do not drive or operate machinery until the medicine wears off and you can think clearly. Your doctor may tell you not to drive or operate machinery until the day after your test.     Do not sign legal documents or make major decisions until the medicine wears off and you can think clearly. The anesthesia can make it hard for you to fully understand what you are agreeing to.   Follow-up care is a key part of your treatment and safety. Be sure to make and go to all appointments, and call your doctor if you are having problems. It's also a good idea to know your test results and keep a list of the medicines you take.  When should you call for help?   Call 911 anytime you think you may need emergency care. For example, call if:    You passed out (lost consciousness).     You pass maroon or bloody stools.     You have trouble breathing.   Call your doctor now or seek immediate medical care if:    You have pain that does not get better after you take pain medicine.     You are sick to your stomach or cannot drink fluids.     You have new or worse belly pain.     You have blood in your stools.     You have a fever.     You cannot pass stools or gas.   Watch closely for changes in your health, and be sure to contact your doctor if you have any problems.  Where can you learn more?  Go to https://www.healthwise.net/patientEd and enter E264 to learn more about \"Colonoscopy: What to Expect at Home.\"  Current as of: February 28, 2023               Content Version: 13.9  ©

## 2024-02-01 NOTE — PROCEDURES
Colonoscopy Procedure  Note          Patient: Denise Blum  : 1950  CRN:  @CRN@    Procedure: Colonoscopy with polypectomy (cold biopsy)    Date:  2024    Surgeon:  Hitesh Pierson MD, MD    Referring Physician:  Tadeo Evans MD    Preoperative Diagnosis:  Gastroesophageal reflux disease with esophagitis, unspecified whether hemorrhage [K21.00]  History of colon polyps [Z86.010]    Postoperative Diagnosis: 3 small colon polyps  Diverticulosis  Large hemorrhoids    Anesthesia:  MAC    EBL: Minimal to none.    Indications: This is a 73 y.o. year old female who comes in today because she is having a colonoscopy for follow-up on colon polyps    Procedure:   An informed consent was obtained from the patient after explanation of indications, benefits, possible risks and complications of the procedure.  The patient was then taken to the endoscopy suite, placed in the left lateral decubitus position, and the above IV anesthesia was administered.      Digital rectal examination was performed.  No masses good rectal tone      Rectum there was a small 3 mm polyp that removed with biopsy forceps also retroflexion showed  large internal hemorrhoid    Sigmoid cyst we also found a 6 mm polyp here that removed with biopsy forcep    Descending normal    Transverse normal    Ascending here there was a 4 mm polyp that removed with biopsy forcep    Cecum normal    TI not entered    Preparation was excellent      The patient tolerated the procedure well and was taken to the PACU in good condition.  There were no immediate complications.      Impression: 3 small colon polyp  Diverticula  Internal hemorrhoid    Recommendations: The patient can start her Eliquis back tomorrow  Will take 3 to 5 days for the biopsy  Given the 3 small colon polyps next colonoscopy can be in 5 years  Thank you for this very kind referral today    Hitesh Pierson MD, MD   [facility]  2024      Please note that some or all of this record

## 2024-02-01 NOTE — H&P
Gastroenterology Note             Pre-operative History and Physical    Patient: Denise Blum  : 1950  CSN:     History Obtained From:  patient and/or guardian.     HISTORY OF PRESENT ILLNESS:    The patient is a 73 y.o. female  here for EGD/colonoscopy.   Patient is for endoscopy she been having from esophagitis she also has history of colon polyp    Past Medical History:    Past Medical History:   Diagnosis Date    Adenomatous polyp 2015    negative for high grade dysplasiaManegold    CREST syndrome (HCC)     GERD (gastroesophageal reflux disease)     Hypertension     Osteopenia 2010    LS -1.15    PAH (pulmonary artery hypertension) (HCC)     Pure hypercholesterolemia     Raynauds syndrome     Rozina    Sigmoid diverticulosis 2015    Manegold    Vitamin D deficiency      Past Surgical History:    Past Surgical History:   Procedure Laterality Date    BREAST BIOPSY      right breast    CARDIAC CATHETERIZATION  12    Right Heart Cath    CATARACT REMOVAL Bilateral 2020    COLONOSCOPY N/A 2020    COLONOSCOPY POLYPECTOMY SNARE/COLD BIOPSY performed by Hitesh John MD at [facility] ENDOSCOPY    EYE SURGERY Left 2019    UPPER GASTROINTESTINAL ENDOSCOPY N/A 2022    EGD BIOPSY performed by Hitesh Pierson MD at [facility] ENDOSCOPY     Medications Prior to Admission:   No current facility-administered medications on file prior to encounter.     Current Outpatient Medications on File Prior to Encounter   Medication Sig Dispense Refill    valsartan-hydroCHLOROthiazide (DIOVAN-HCT) 80-12.5 MG per tablet Take 1 tablet by mouth daily 90 tablet 3    atorvastatin (LIPITOR) 10 MG tablet TAKE 1 TABLET DAILY 90 tablet 3    metoprolol succinate (TOPROL XL) 25 MG extended release tablet TAKE 1 TABLET NIGHTLY 90 tablet 3    alendronate (FOSAMAX) 70 MG tablet TAKE 1 TABLET EVERY 7 DAYS 12 tablet 3    ELIQUIS 5 MG TABS tablet TAKE 1 TABLET TWICE A  tablet 3

## 2024-02-01 NOTE — ANESTHESIA POSTPROCEDURE EVALUATION
Department of Anesthesiology  Postprocedure Note    Patient: Denise Blum  MRN: [id number]  YOB: 1950  Date of evaluation: 2/1/2024    Procedure Summary       Date: 02/01/24 Room / Location: [place] [address] / [facility]    Anesthesia Start: 1055 Anesthesia Stop: 1133    Procedures:       EGD BIOPSY      COLONOSCOPY POLYPECTOMY SNARE/COLD BIOPSY Diagnosis:       Gastroesophageal reflux disease with esophagitis, unspecified whether hemorrhage      History of colon polyps      (Gastroesophageal reflux disease with esophagitis, unspecified whether hemorrhage [K21.00])      (History of colon polyps [Z86.010])    Surgeons: Hitesh Pierson MD Responsible Provider: Xavier Samuel MD    Anesthesia Type: MAC ASA Status: 3            Anesthesia Type: No value filed.    Omar Phase I: Omar Score: 10    Omar Phase II: Omar Score: 10    Anesthesia Post Evaluation    Patient location during evaluation: PACU  Patient participation: complete - patient participated  Level of consciousness: awake  Pain score: 0  Airway patency: patent  Nausea & Vomiting: no nausea  Cardiovascular status: hemodynamically stable  Respiratory status: acceptable  Hydration status: stable  Pain management: satisfactory to patient    No notable events documented.

## 2024-02-01 NOTE — PROGRESS NOTES
Ambulatory Surgery/Procedure Discharge Note    Vitals:    02/01/24 1133   BP: 127/69   Pulse: 73   Resp: 16   Temp: 97.7 °F (36.5 °C)   SpO2: 100%   Phase2 endo post-op    No intake/output data recorded.    Restroom use offered before discharge.  Yes    Pain assessment:  none  Pain Level: 0    1210 reviewed D/c instructions with pt and   both verbalized thier understanding resume eliquis 2/2/24  D/c iv up to dress waiting on   1234pm  Patient discharged to home/self care. Patient discharged via wheel chair by transporter to waiting family/S.O.       2/1/2024

## 2024-02-01 NOTE — ANESTHESIA PRE PROCEDURE
BP Readings from Last 3 Encounters:   02/01/24 (!) 161/73   01/24/24 106/60   07/17/23 122/66       NPO Status:  before mn                                                                                BMI:   Wt Readings from Last 3 Encounters:   02/01/24 81.6 kg (180 lb)   01/24/24 84.8 kg (187 lb)   07/17/23 84.4 kg (186 lb)     Body mass index is 30.9 kg/m².    CBC:   Lab Results   Component Value Date/Time    WBC 5.6 01/24/2024 11:52 AM    RBC 4.25 01/24/2024 11:52 AM    HGB 12.0 01/24/2024 11:52 AM    HCT 36.8 01/24/2024 11:52 AM    MCV 86.6 01/24/2024 11:52 AM    RDW 15.1 01/24/2024 11:52 AM     01/24/2024 11:52 AM       CMP:   Lab Results   Component Value Date/Time     01/24/2024 11:52 AM    K 4.0 01/24/2024 11:52 AM     01/24/2024 11:52 AM    CO2 30 01/24/2024 11:52 AM    BUN 14 01/24/2024 11:52 AM    CREATININE 0.9 01/24/2024 11:52 AM    GFRAA >60 08/02/2022 12:48 PM    GFRAA >60 04/26/2013 01:17 PM    AGRATIO 1.7 01/24/2024 11:52 AM    LABGLOM >60 01/24/2024 11:52 AM    GLUCOSE 96 01/24/2024 11:52 AM    PROT 6.9 01/24/2024 11:52 AM    PROT 7.6 11/29/2012 04:00 PM    CALCIUM 8.9 01/24/2024 11:52 AM    BILITOT 0.3 01/24/2024 11:52 AM    ALKPHOS 68 01/24/2024 11:52 AM    AST 17 01/24/2024 11:52 AM    ALT 12 01/24/2024 11:52 AM       POC Tests: No results for input(s): \"POCGLU\", \"POCNA\", \"POCK\", \"POCCL\", \"POCBUN\", \"POCHEMO\", \"POCHCT\" in the last 72 hours.    Coags: No results found for: \"PROTIME\", \"INR\", \"APTT\"    HCG (If Applicable): No results found for: \"PREGTESTUR\", \"PREGSERUM\", \"HCG\", \"HCGQUANT\"     ABGs: No results found for: \"PHART\", \"PO2ART\", \"JKD0NQV\", \"ZZJ9GDG\", \"BEART\", \"V7KSLITF\"     Type & Screen (If Applicable):  No results found for: \"LABABO\", \"LABRH\"    Drug/Infectious Status (If Applicable):  No results found for: \"HIV\", \"HEPCAB\"    COVID-19 Screening (If Applicable):   Lab Results   Component Value Date/Time    COVID19 Not Detected

## 2024-02-01 NOTE — PROCEDURES
Endoscopy Note    Patient: Denise Blum  : 1950  CSN: [id number]    Procedure: Esophagogastroduodenoscopy with biopsy    Date:  2024     Surgeon:  Hitesh Pierson MD     Referring Physician:  Tadeo Evans MD    Preoperative Diagnosis:  Gastroesophageal reflux disease with esophagitis, unspecified whether hemorrhage [K21.00]  History of colon polyps [Z86.010]    Postoperative Diagnosis: Mild inflammation of the distal esophagus biopsied #2 large hiatal hernia measuring 5 cm #3 inflammation of the antrum    Anesthesia:  MAC    EBL: minimal to none.    Indications: This is a 73 y.o. year old female who I had the pleasure of seeing several months ago she came in and had a grade C reflux esophagitis she is in today for a follow-up she clinically is doing much better    Description of Procedure:  Informed consent was obtained from the patient after explanation of indications, benefits and possible risks and complications of the procedure.  The patient was then taken to the endoscopy suite, placed in the left lateral decubitus position and the above IV sedation was administrered.    The Olympus video endoscope was passed through the hypopharynx    Posterior pharynx was normal  Esophagus the patient had ringing of the esophagus but this is been previously biopsied and it was not EOE the distal esophagus esophagitis was healed but it left somewhat of a different looking mucosa that was not essentially Prescott's but it was just different looking so I did take a biopsy here  Hiatal hernias 5 cm  Stomach there is inflammation of the antrum that I did biopsy  Duodenum was normal  Retroflexion showed a large hiatal hernia    Gastric or Duodenal ulcer present: No    The patient tolerated the procedure well and was taken to the post anesthesia care unit in good condition.  There were no immediate complications.      Impression: #1 inflammation of the antrum #2 inflammation of the distal esophagus but it was not

## 2024-02-20 ENCOUNTER — HOSPITAL ENCOUNTER (OUTPATIENT)
Dept: PHYSICAL THERAPY | Age: 74
Setting detail: THERAPIES SERIES
Discharge: HOME | End: 2024-02-20
Payer: COMMERCIAL

## 2024-02-20 DIAGNOSIS — R29.898 IMPAIRED FLEXIBILITY OF LOWER EXTREMITY: ICD-10-CM

## 2024-02-20 DIAGNOSIS — R29.898 IMPAIRED STRENGTH OF HIP MUSCLES: Primary | ICD-10-CM

## 2024-02-20 PROCEDURE — 97161 PT EVAL LOW COMPLEX 20 MIN: CPT

## 2024-02-20 PROCEDURE — 97530 THERAPEUTIC ACTIVITIES: CPT

## 2024-02-20 PROCEDURE — 97110 THERAPEUTIC EXERCISES: CPT

## 2024-02-20 NOTE — PLAN OF CARE
Traction (99611)     Gait (51568)    Dry Needle 1-2 muscle (47723)     Aquatic Therex (50163)    Dry Needle 3+ muscle (55618)     Iontophoresis (31080)    VASO (51228)     Ultrasound (70726)    Group Therapy (78296)     Estim Attended (46380)    Canalith Repositioning (76845)     Other:    Other:    Total Timed Code Tx Minutes 23 2  1     Total Treatment Minutes 45        Charge Justification:  (66447) HOME EXERCISE PROGRAM - Reviewed/Progressed HEP activities related to strengthening, flexibility, endurance, ROM performed to prevent loss of range of motion, maintain or improve muscular strength or increase flexibility, following either an injury or surgery.  (42800) THERAPEUTIC ACTIVITY - use of dynamic activities to improve functional performance. (Ex include squatting, ascending/descending stairs, walking, bending, lifting, catching, throwing, pushing, pulling, jumping.)  Direct, one on one contact, billed in 15-minute increments.      GOALS     Patient stated goal: To be able to get off the ground easily, improve knee and back pain.   Status:  [] Progressing: [] Met: [] Not Met: [] Adjusted    Therapist goals for Patient:   Short Term Goals: To be achieved in: 3 weeks  Independent in HEP and progression per patient tolerance, in order to progress toward full function and prevent re-injury.    Status: [] Progressing: [] Met: [] Not Met: [] Adjusted  Pt will improve hip extension strength to 4-/5 for improved ability to perform floor transfers, allowing her to be more active with her grandchildren, exercise, and complete housework.    Status: [] Progressing: [] Met: [] Not Met: [] Adjusted    Long Term Goals: To be achieved in:  12  weeks  Pt will improve hip extension strength to 4+/5 for improved ability to perform floor transfers, allowing her to be more active with her grandchildren, exercise, and complete housework.   Status: [] Progressing: [] Met: [] Not Met: [] Adjusted  Patient will complete a floor

## 2024-05-29 DIAGNOSIS — E78.00 PURE HYPERCHOLESTEROLEMIA: ICD-10-CM

## 2024-05-29 DIAGNOSIS — R06.02 SOB (SHORTNESS OF BREATH): ICD-10-CM

## 2024-05-29 DIAGNOSIS — I10 ESSENTIAL HYPERTENSION: ICD-10-CM

## 2024-05-29 DIAGNOSIS — I73.00 RAYNAUD'S DISEASE WITHOUT GANGRENE: ICD-10-CM

## 2024-05-29 DIAGNOSIS — I27.21 PAH (PULMONARY ARTERY HYPERTENSION) (HCC): ICD-10-CM

## 2024-05-29 DIAGNOSIS — M34.1 CREST SYNDROME (HCC): ICD-10-CM

## 2024-05-30 RX ORDER — AMLODIPINE BESYLATE 5 MG/1
TABLET ORAL
Qty: 90 TABLET | Refills: 3 | Status: SHIPPED | OUTPATIENT
Start: 2024-05-30

## 2024-06-11 ENCOUNTER — OFFICE VISIT (OUTPATIENT)
Dept: CARDIOLOGY CLINIC | Age: 74
End: 2024-06-11
Payer: COMMERCIAL

## 2024-06-11 VITALS
OXYGEN SATURATION: 99 % | SYSTOLIC BLOOD PRESSURE: 132 MMHG | BODY MASS INDEX: 32.06 KG/M2 | WEIGHT: 187.8 LBS | HEART RATE: 60 BPM | HEIGHT: 64 IN | DIASTOLIC BLOOD PRESSURE: 70 MMHG

## 2024-06-11 DIAGNOSIS — D68.69 SECONDARY HYPERCOAGULABLE STATE (HCC): ICD-10-CM

## 2024-06-11 DIAGNOSIS — I48.0 PAF (PAROXYSMAL ATRIAL FIBRILLATION) (HCC): Primary | ICD-10-CM

## 2024-06-11 DIAGNOSIS — I10 PRIMARY HYPERTENSION: Chronic | ICD-10-CM

## 2024-06-11 DIAGNOSIS — E78.00 PURE HYPERCHOLESTEROLEMIA: Chronic | ICD-10-CM

## 2024-06-11 PROCEDURE — 3075F SYST BP GE 130 - 139MM HG: CPT | Performed by: NURSE PRACTITIONER

## 2024-06-11 PROCEDURE — 93000 ELECTROCARDIOGRAM COMPLETE: CPT | Performed by: NURSE PRACTITIONER

## 2024-06-11 PROCEDURE — 99214 OFFICE O/P EST MOD 30 MIN: CPT | Performed by: NURSE PRACTITIONER

## 2024-06-11 PROCEDURE — 3078F DIAST BP <80 MM HG: CPT | Performed by: NURSE PRACTITIONER

## 2024-06-11 PROCEDURE — 1123F ACP DISCUSS/DSCN MKR DOCD: CPT | Performed by: NURSE PRACTITIONER

## 2024-07-11 RX ORDER — ALENDRONATE SODIUM 70 MG/1
70 TABLET ORAL
Qty: 12 TABLET | Refills: 3 | OUTPATIENT
Start: 2024-07-11

## 2024-07-11 NOTE — TELEPHONE ENCOUNTER
LV 6/27/24 WITH DR HEATON FOR AWV NV NONE  PLEASE CALL PT TO SCHEDULE APPT THEN WE CAN SEND IN REFILL

## 2024-07-11 NOTE — TELEPHONE ENCOUNTER
From: Denise Blum  To: Office of Dr. Tadeo Evans  Sent: 7/10/2024 6:59 PM EDT  Subject: Medication Renewal Request    Refills have been requested for the following medications:     alendronate (FOSAMAX) 70 MG tablet [Luke A Glischinski, APRN - CNP]    Preferred pharmacy: [facility] [place] - [address] - P [phone] - F [phone]

## 2024-07-16 NOTE — PROGRESS NOTES
[facility]   Advanced Heart Failure/Pulmonary Hypertension  Cardiac Evaluation      Denise Blum  YOB: 1950    Date of Visit:  7/24/24  Chief Complaint   Patient presents with    6 Month Follow-Up    Shortness of Breath     PAH      History of Present Illness:  Denise Blum has a history of PAH related to CREST syndrome. She was started on Letairis 5mg in Dec after a RHC on 11/29/12.  She stopped Letaris due to swelling and does not want to take another medication in it's place. She does suffer from Raynaud's and has cold fingers but no wounds. Sees Dr. Handy regularly.       Her echo 12/2021 showed normal right ventricular size and function and EF 55%.     Monitor 7/2022-8/2022 showed PAF w/ avr, 7% burden. She saw Dr. Youngblood 8/30/22 (initial visit) and was started on Eliquis.    She is fully vaccinated and boosted.     Today, she is here for regular follow up of her PAH/CREST (calcinosis, Raynaud's phenomenon, esophageal dysfunction, sclerodactyly, telangiectasia).   Pt is here with  today. She states things are good with her Crest syndrome. Doesn't take any medications for that. Pt denies any SOB, chest pain or dizziness. Denies SOB, swelling, chest pain. No edema noted. Reviewed latest labs and cardiac testing with pt; including labs and cardiac testing from [facility]. Pt states verbal understanding. Denies any problems with her Raynaud's. Echo in January looks good, discussed with pt and family, they state verbal understanding. She just had a colonoscopy and EGD at the beginning of the year and \"they didn't find anything\", they stretched her esophagus. She is seeing Dr. Perkins yearly.          NYHA Class:  II  No Known Allergies     Current Outpatient Medications on File Prior to Visit   Medication Sig Dispense Refill    amLODIPine (NORVASC) 5 MG tablet TAKE 1 TABLET DAILY 90 tablet 3    hydroCHLOROthiazide (HYDRODIURIL) 25 MG tablet Take 1 tablet by mouth

## 2024-07-24 ENCOUNTER — OFFICE VISIT (OUTPATIENT)
Dept: CARDIOLOGY CLINIC | Age: 74
End: 2024-07-24
Payer: COMMERCIAL

## 2024-07-24 ENCOUNTER — TELEPHONE (OUTPATIENT)
Dept: FAMILY MEDICINE CLINIC | Age: 74
End: 2024-07-24

## 2024-07-24 ENCOUNTER — HOSPITAL ENCOUNTER (OUTPATIENT)
Age: 74
Discharge: HOME | End: 2024-07-24
Payer: COMMERCIAL

## 2024-07-24 VITALS
HEIGHT: 64 IN | DIASTOLIC BLOOD PRESSURE: 60 MMHG | HEART RATE: 70 BPM | OXYGEN SATURATION: 99 % | BODY MASS INDEX: 32.27 KG/M2 | WEIGHT: 189 LBS | SYSTOLIC BLOOD PRESSURE: 112 MMHG

## 2024-07-24 DIAGNOSIS — M34.1 CREST SYNDROME (HCC): ICD-10-CM

## 2024-07-24 DIAGNOSIS — E55.9 VITAMIN D DEFICIENCY: ICD-10-CM

## 2024-07-24 DIAGNOSIS — E78.00 PURE HYPERCHOLESTEROLEMIA: ICD-10-CM

## 2024-07-24 DIAGNOSIS — I27.21 PAH (PULMONARY ARTERY HYPERTENSION) (HCC): Primary | ICD-10-CM

## 2024-07-24 DIAGNOSIS — I48.0 PAF (PAROXYSMAL ATRIAL FIBRILLATION) (HCC): ICD-10-CM

## 2024-07-24 DIAGNOSIS — I27.21 PAH (PULMONARY ARTERY HYPERTENSION) (HCC): ICD-10-CM

## 2024-07-24 DIAGNOSIS — R06.02 SOB (SHORTNESS OF BREATH): ICD-10-CM

## 2024-07-24 DIAGNOSIS — I10 ESSENTIAL HYPERTENSION: ICD-10-CM

## 2024-07-24 DIAGNOSIS — I73.00 RAYNAUD'S DISEASE WITHOUT GANGRENE: ICD-10-CM

## 2024-07-24 LAB
25(OH)D3 SERPL-MCNC: 38.6 NG/ML
ALBUMIN SERPL-MCNC: 4.4 G/DL (ref 3.4–5)
ALBUMIN/GLOB SERPL: 1.5 {RATIO} (ref 1.1–2.2)
ALP SERPL-CCNC: 79 U/L (ref 40–129)
ALT SERPL-CCNC: 15 U/L (ref 10–40)
ANION GAP SERPL CALCULATED.3IONS-SCNC: 15 MMOL/L (ref 3–16)
AST SERPL-CCNC: 18 U/L (ref 15–37)
BILIRUB SERPL-MCNC: 0.3 MG/DL (ref 0–1)
BUN SERPL-MCNC: 15 MG/DL (ref 7–20)
CALCIUM SERPL-MCNC: 9.6 MG/DL (ref 8.3–10.6)
CHLORIDE SERPL-SCNC: 101 MMOL/L (ref 99–110)
CHOLEST SERPL-MCNC: 164 MG/DL (ref 0–199)
CO2 SERPL-SCNC: 24 MMOL/L (ref 21–32)
CREAT SERPL-MCNC: 0.8 MG/DL (ref 0.6–1.2)
DEPRECATED RDW RBC AUTO: 14.8 % (ref 12.4–15.4)
GFR SERPLBLD CREATININE-BSD FMLA CKD-EPI: 76 ML/MIN/{1.73_M2}
GLUCOSE SERPL-MCNC: 87 MG/DL (ref 70–99)
HCT VFR BLD AUTO: 39.1 % (ref 36–48)
HDLC SERPL-MCNC: 58 MG/DL (ref 40–60)
HGB BLD-MCNC: 12.6 G/DL (ref 12–16)
LDL CHOLESTEROL: 81 MG/DL
MCH RBC QN AUTO: 28.7 PG (ref 26–34)
MCHC RBC AUTO-ENTMCNC: 32.3 G/DL (ref 31–36)
MCV RBC AUTO: 88.9 FL (ref 80–100)
NT-PROBNP SERPL-MCNC: 266 PG/ML (ref 0–449)
PLATELET # BLD AUTO: 166 K/UL (ref 135–450)
PMV BLD AUTO: 9.6 FL (ref 5–10.5)
POTASSIUM SERPL-SCNC: 3.6 MMOL/L (ref 3.5–5.1)
PROT SERPL-MCNC: 7.3 G/DL (ref 6.4–8.2)
RBC # BLD AUTO: 4.39 M/UL (ref 4–5.2)
SODIUM SERPL-SCNC: 140 MMOL/L (ref 136–145)
TRIGL SERPL-MCNC: 125 MG/DL (ref 0–150)
VLDLC SERPL CALC-MCNC: 25 MG/DL
WBC # BLD AUTO: 6 K/UL (ref 4–11)

## 2024-07-24 PROCEDURE — 80061 LIPID PANEL: CPT

## 2024-07-24 PROCEDURE — 36415 COLL VENOUS BLD VENIPUNCTURE: CPT

## 2024-07-24 PROCEDURE — 99215 OFFICE O/P EST HI 40 MIN: CPT | Performed by: INTERNAL MEDICINE

## 2024-07-24 PROCEDURE — 83880 ASSAY OF NATRIURETIC PEPTIDE: CPT

## 2024-07-24 PROCEDURE — 80053 COMPREHEN METABOLIC PANEL: CPT

## 2024-07-24 PROCEDURE — 82306 VITAMIN D 25 HYDROXY: CPT

## 2024-07-24 PROCEDURE — 1123F ACP DISCUSS/DSCN MKR DOCD: CPT | Performed by: INTERNAL MEDICINE

## 2024-07-24 PROCEDURE — 3074F SYST BP LT 130 MM HG: CPT | Performed by: INTERNAL MEDICINE

## 2024-07-24 PROCEDURE — 85027 COMPLETE CBC AUTOMATED: CPT

## 2024-07-24 PROCEDURE — 3078F DIAST BP <80 MM HG: CPT | Performed by: INTERNAL MEDICINE

## 2024-07-24 NOTE — PATIENT INSTRUCTIONS
Plan:    Cut amlodipine dose in half, from 5mg to 2.5mg  Routine labs today  Follow up with Dr. Delarosa in 6 months  Echo in 6 months  Call office if any worsening symptoms such as shortness of breath, swelling, or chest pain

## 2024-07-24 NOTE — TELEPHONE ENCOUNTER
----- Message from Dereje Rosa Iqra Maria sent at 7/24/2024 11:45 AM EDT -----  Regarding: [facility] Appointment Request  [facility] Appointment Request    Patient needs appointment for [facility] Appointment Type: Annual Visit. AWV    Patient Requested Dates(s): Week of Aug. 19  Patient Requested Time: morning  Provider Name: Tadeo Evans MD    Reason for Appointment Request: Established Patient - No appointments available during search  --------------------------------------------------------------------------------------------------------------------------    Relationship to Patient: Self     Call Back Information: OK to leave message on voicemail  Preferred Call Back Number: Phone [phone]

## 2024-08-05 NOTE — TELEPHONE ENCOUNTER
Requested Prescriptions     Pending Prescriptions Disp Refills    metoprolol succinate (TOPROL XL) 25 MG extended release tablet [Pharmacy Med Name: METOPROL SUC TAB 25MG ER] 90 tablet 3     Sig: TAKE 1 TABLET NIGHTLY      [facility]     Last OV:  2024 MARQUIS    Next OV: 2025 MARQUIS    Last ek2024     Last Filled: 10/20/2023 RMM

## 2024-08-06 RX ORDER — FUROSEMIDE 20 MG/1
20 TABLET ORAL PRN
Qty: 60 TABLET | Refills: 3 | Status: SHIPPED | OUTPATIENT
Start: 2024-08-06

## 2024-08-06 RX ORDER — FUROSEMIDE 20 MG/1
20 TABLET ORAL PRN
COMMUNITY
End: 2024-08-06 | Stop reason: SDUPTHER

## 2024-08-06 NOTE — TELEPHONE ENCOUNTER
Medication Refill  *New pharmacy below - script was discontinued*  Medication needing refilled:  furosemide (LASIX)   Dosage of the medication:  20 MG tablet   How are you taking this medication (QD, BID, TID, QID, PRN):  Take 1 tablet by mouth daily as needed (swelling)   30 or 90 day supply called in:    When will you run out of your medication:  Pt's medication , pt would like some on hand because she is going on vacation  Which Pharmacy are we sending the medication to?:  [id number] [place] [address] [facility] [phone] -  [phone]

## 2024-08-09 DIAGNOSIS — E78.00 PURE HYPERCHOLESTEROLEMIA: ICD-10-CM

## 2024-08-09 DIAGNOSIS — I27.21 PAH (PULMONARY ARTERY HYPERTENSION) (HCC): ICD-10-CM

## 2024-08-09 DIAGNOSIS — I10 ESSENTIAL HYPERTENSION: ICD-10-CM

## 2024-08-09 DIAGNOSIS — R06.02 SOB (SHORTNESS OF BREATH): ICD-10-CM

## 2024-08-09 DIAGNOSIS — I73.00 RAYNAUD'S DISEASE WITHOUT GANGRENE: ICD-10-CM

## 2024-08-09 DIAGNOSIS — M34.1 CREST SYNDROME (HCC): ICD-10-CM

## 2024-08-09 RX ORDER — METOPROLOL SUCCINATE 25 MG/1
25 TABLET, EXTENDED RELEASE ORAL
Qty: 90 TABLET | Refills: 3 | Status: SHIPPED | OUTPATIENT
Start: 2024-08-09

## 2024-08-12 RX ORDER — ATORVASTATIN CALCIUM 10 MG/1
TABLET, FILM COATED ORAL
Qty: 90 TABLET | Refills: 3 | Status: SHIPPED | OUTPATIENT
Start: 2024-08-12

## 2024-08-12 RX ORDER — HYDROCHLOROTHIAZIDE 25 MG/1
25 TABLET ORAL
Qty: 96 TABLET | Refills: 3 | Status: SHIPPED | OUTPATIENT
Start: 2024-08-13

## 2024-08-16 ENCOUNTER — TELEPHONE (OUTPATIENT)
Dept: FAMILY MEDICINE CLINIC | Age: 74
End: 2024-08-16

## 2024-08-16 DIAGNOSIS — U07.1 COVID-19: Primary | ICD-10-CM

## 2024-08-16 NOTE — TELEPHONE ENCOUNTER
Patient was calling because they tested Positive today for Covid Today and she wanted to know her next steps.    PT C/O NASAL DRAINAGE: yes   WHAT COLOR: clear    COUGH: yes   BRINGING UP PHLEGM:  no   IF YES, WHAT COLOR:    SORE THROAT:  no  PND:  no  HEADACHE:  no  EAR PAIN:  no  LEFT, RIGHT OR BOTH:    S.O.B.:  no  WITH OR WITHOUT EXERTION:    WHEEZING:  no  HEAD CONGESTION:  no  CHEST CONGESTION:  no  BODY ACHES:  yes  VOMITTING no  DIARRHEA:  no  TEMP:  no but feels feverish   IF SO, HIGHEST TEMP:    SYMPTOMS FOR HOW MANY DAYS:  Yesterday     [facility]   Phone number

## 2024-09-17 RX ORDER — ALENDRONATE SODIUM 70 MG/1
70 TABLET ORAL
Qty: 12 TABLET | Refills: 0 | Status: SHIPPED | OUTPATIENT
Start: 2024-09-17

## 2024-10-03 ENCOUNTER — HOSPITAL ENCOUNTER (OUTPATIENT)
Dept: CT IMAGING | Age: 74
Discharge: HOME | End: 2024-10-03
Attending: INTERNAL MEDICINE
Payer: COMMERCIAL

## 2024-10-03 DIAGNOSIS — R14.0 ABDOMINAL BLOATING: ICD-10-CM

## 2024-10-03 LAB
BUN SERPL-MCNC: 17 MG/DL (ref 7–20)
CREAT SERPL-MCNC: 0.8 MG/DL (ref 0.6–1.2)
GFR SERPLBLD CREATININE-BSD FMLA CKD-EPI: 76 ML/MIN/{1.73_M2}

## 2024-10-03 PROCEDURE — 6360000004 HC RX CONTRAST MEDICATION: Performed by: INTERNAL MEDICINE

## 2024-10-03 PROCEDURE — 74177 CT ABD & PELVIS W/CONTRAST: CPT

## 2024-10-03 PROCEDURE — 82565 ASSAY OF CREATININE: CPT

## 2024-10-03 PROCEDURE — 36415 COLL VENOUS BLD VENIPUNCTURE: CPT

## 2024-10-03 PROCEDURE — 84520 ASSAY OF UREA NITROGEN: CPT

## 2024-10-03 RX ORDER — IOPAMIDOL 755 MG/ML
75 INJECTION, SOLUTION INTRAVASCULAR
Status: COMPLETED | OUTPATIENT
Start: 2024-10-03 | End: 2024-10-03

## 2024-10-03 RX ADMIN — IOHEXOL 25 ML: 350 INJECTION, SOLUTION INTRAVENOUS at 12:36

## 2024-10-03 RX ADMIN — IOPAMIDOL 75 ML: 755 INJECTION, SOLUTION INTRAVENOUS at 12:37

## 2024-10-22 ENCOUNTER — HOSPITAL ENCOUNTER (OUTPATIENT)
Dept: WOMENS IMAGING | Age: 74
Discharge: HOME | End: 2024-10-22
Payer: COMMERCIAL

## 2024-10-22 VITALS — HEIGHT: 65 IN | BODY MASS INDEX: 30.82 KG/M2 | WEIGHT: 185 LBS

## 2024-10-22 DIAGNOSIS — Z12.31 ENCOUNTER FOR SCREENING MAMMOGRAM FOR BREAST CANCER: ICD-10-CM

## 2024-10-22 PROCEDURE — 77063 BREAST TOMOSYNTHESIS BI: CPT

## 2024-11-05 ENCOUNTER — OFFICE VISIT (OUTPATIENT)
Dept: FAMILY MEDICINE CLINIC | Age: 74
End: 2024-11-05

## 2024-11-05 VITALS
SYSTOLIC BLOOD PRESSURE: 128 MMHG | HEIGHT: 65 IN | DIASTOLIC BLOOD PRESSURE: 66 MMHG | BODY MASS INDEX: 30.79 KG/M2 | HEART RATE: 83 BPM | OXYGEN SATURATION: 96 %

## 2024-11-05 DIAGNOSIS — M34.1 CREST SYNDROME (HCC): Chronic | ICD-10-CM

## 2024-11-05 DIAGNOSIS — D68.69 SECONDARY HYPERCOAGULABLE STATE (HCC): ICD-10-CM

## 2024-11-05 DIAGNOSIS — Z00.00 MEDICARE ANNUAL WELLNESS VISIT, SUBSEQUENT: Primary | ICD-10-CM

## 2024-11-05 DIAGNOSIS — I27.21 PAH (PULMONARY ARTERY HYPERTENSION) (HCC): Chronic | ICD-10-CM

## 2024-11-05 DIAGNOSIS — I48.0 PAF (PAROXYSMAL ATRIAL FIBRILLATION) (HCC): ICD-10-CM

## 2024-11-05 SDOH — ECONOMIC STABILITY: INCOME INSECURITY: IN THE LAST 12 MONTHS, WAS THERE A TIME WHEN YOU WERE NOT ABLE TO PAY THE MORTGAGE OR RENT ON TIME?: NO

## 2024-11-05 SDOH — ECONOMIC STABILITY: FOOD INSECURITY: WITHIN THE PAST 12 MONTHS, YOU WORRIED THAT YOUR FOOD WOULD RUN OUT BEFORE YOU GOT MONEY TO BUY MORE.: NEVER TRUE

## 2024-11-05 SDOH — ECONOMIC STABILITY: INCOME INSECURITY: HOW HARD IS IT FOR YOU TO PAY FOR THE VERY BASICS LIKE FOOD, HOUSING, MEDICAL CARE, AND HEATING?: NOT HARD AT ALL

## 2024-11-05 SDOH — ECONOMIC STABILITY: FOOD INSECURITY: WITHIN THE PAST 12 MONTHS, THE FOOD YOU BOUGHT JUST DIDN'T LAST AND YOU DIDN'T HAVE MONEY TO GET MORE.: NEVER TRUE

## 2024-11-05 ASSESSMENT — LIFESTYLE VARIABLES
HOW MANY STANDARD DRINKS CONTAINING ALCOHOL DO YOU HAVE ON A TYPICAL DAY: PATIENT DOES NOT DRINK
HOW OFTEN DO YOU HAVE A DRINK CONTAINING ALCOHOL: NEVER

## 2024-11-05 ASSESSMENT — PATIENT HEALTH QUESTIONNAIRE - PHQ9
SUM OF ALL RESPONSES TO PHQ QUESTIONS 1-9: 0
SUM OF ALL RESPONSES TO PHQ9 QUESTIONS 1 & 2: 0
1. LITTLE INTEREST OR PLEASURE IN DOING THINGS: NOT AT ALL
SUM OF ALL RESPONSES TO PHQ QUESTIONS 1-9: 0
2. FEELING DOWN, DEPRESSED OR HOPELESS: NOT AT ALL

## 2024-11-05 NOTE — PROGRESS NOTES
Medicare Annual Wellness Visit    Denise Blum is here for Medicare AWV (Labs drawn in 7/24)    Assessment & Plan   Medicare annual wellness visit, subsequent  -No blood work ordered during today's visit since the blood work she had drawn in July was grossly normal.  Patient is seeing cardiology in January, will get her blood work drawn at that time.    CREST syndrome (HCC)  -Currently stable, patient is not taking any medications for this.  - Follow with rheumatology.     PAH (pulmonary artery hypertension) (HCC)  - stable, continue Valsartan-HCTZ 80-12.5 mg daily and Metoprolol succinate 25 mg nightly.  - management per cardiology.    PAF (paroxysmal atrial fibrillation) (HCC)  - stable, rate controlled in office today, heart sounds apical regularly, no adventitious heart sounds auscultated.   - continue Metoprolol succinate 25 mg nightly and Eliquis 5 mg daily.    Secondary hypercoagulable state (HCC)  - continue Eliquis 5 mg daily.     Raynaud's disease without gangrene  - stable, not exacerbated in office today.  - continue Amlodipine 2.5 mg daily (also for HTN).    Hyperlipidemia  - Stable as of lab work drawn back in July, will get lipid level drawn with cardiology in January.  - Goal: LDL <100  - continue Atorvastatin 10 mg daily.   - provided education on proper diet and exercise, DASH and mediterranean diets provided in AVS.     Essential Hypertension  - Stable in office today, continue amlodipine 2.5 mg daily (used also for Raynaud's)  - provided education on proper diet and exercise, DASH and mediterranean diets provided in AVS.       Recommendations for Preventive Services Due: see orders and patient instructions/AVS.  Recommended screening schedule for the next 5-10 years is provided to the patient in written form: see Patient Instructions/AVS.     Return in 1 year (on 11/5/2025) for Medicare AWV.     Subjective       Pt is here for her annual wellness exam.    Pt has a history of PAH r/t CREST

## 2024-11-05 NOTE — PATIENT INSTRUCTIONS
[place] [employer]. Call [phone] or search [employer] [place] [employer] online.  You need 7843-6959 mg of calcium and 3585-6248 IU of vitamin D per day. It is possible to meet your calcium requirement with diet alone, but a vitamin D supplement is usually necessary to meet this goal.  When exposed to the sun, use a sunscreen that protects against both UVA and UVB radiation with an SPF of 30 or greater. Reapply every 2 to 3 hours or after sweating, drying off with a towel, or swimming.  Always wear a seat belt when traveling in a car. Always wear a helmet when riding a bicycle or motorcycle.

## 2024-11-18 RX ORDER — VALSARTAN AND HYDROCHLOROTHIAZIDE 80; 12.5 MG/1; MG/1
TABLET, FILM COATED ORAL
Qty: 90 TABLET | Refills: 3 | Status: SHIPPED | OUTPATIENT
Start: 2024-11-18

## 2024-11-18 NOTE — TELEPHONE ENCOUNTER
Prescription refill:  Requested Prescriptions     Pending Prescriptions Disp Refills    valsartan-hydroCHLOROthiazide (DIOVAN-HCT) 80-12.5 MG per tablet [Pharmacy Med Name: VALSARTN HCT TAB 80-12.5] 90 tablet 3     Sig: TAKE 1 TABLET DAILY (NEW   STRENGTH)       Refill parameters per protocol were met    Last OV:  7/24/24    Future Appt: 1/24/2025     Labs:    Lab Results   Component Value Date     07/24/2024    K 3.6 07/24/2024     07/24/2024    CO2 24 07/24/2024    BUN 17 10/03/2024    CREATININE 0.8 10/03/2024    GLUCOSE 87 07/24/2024    CALCIUM 9.6 07/24/2024    BILITOT 0.3 07/24/2024    ALKPHOS 79 07/24/2024    AST 18 07/24/2024    ALT 15 07/24/2024    LABGLOM 76 10/03/2024    GFRAA >60 08/02/2022    AGRATIO 1.5 07/24/2024    GLOB 2.7 06/11/2021       Lab Results   Component Value Date    CHOL 151 01/24/2024    TRIG 117 01/24/2024    HDL 58 07/24/2024    LDL 81 07/24/2024    VLDL 25 07/24/2024       Lab Results   Component Value Date    ALT 15 07/24/2024    AST 18 07/24/2024    ALKPHOS 79 07/24/2024    BILITOT 0.3 07/24/2024

## 2024-12-17 NOTE — PROGRESS NOTES
Subjective:       Ryanne Carpenter is a 66 y.o. female the patient returns for follow-up of crest syndrome. Her main complaint today is increased difficulty in swallowing. She continues on amlodipine for her Raynaud's. She is having difficulties with solids primarily. Review of Systems:    Denies chest pain denies shortness of breath    Objective:     /80   Pulse 72   Ht 5' 4\" (1.626 m)   Wt 187 lb (84.8 kg)   BMI 32.10 kg/m²   Alert female in no acute distress. Lungs clear to auscultation. Cardiovascular exam normal sinus rhythm. Skin scattered telangiectasias no digital ulceration mild sclerodactyly    Assessment:      Crest syndrome  Plan:    Referral to GI will be made for evaluation of her esophagus. Creatinine will be checked. I will see her back in 6 months time.         Eliza Nelson MD, MD, 6/15/2022 11:04 AM
Private Residence

## 2024-12-20 RX ORDER — ALENDRONATE SODIUM 70 MG/1
70 TABLET ORAL
Qty: 12 TABLET | Refills: 3 | Status: SHIPPED | OUTPATIENT
Start: 2024-12-20

## 2025-01-08 NOTE — PROGRESS NOTES
[facility]   Advanced Heart Failure/Pulmonary Hypertension  Cardiac Evaluation      Denise Blum  YOB: 1950    Date of Visit:  25  Chief Complaint   Patient presents with    Congestive Heart Failure      History of Present Illness:  Denise Blum has a history of PAH related to CREST syndrome. She was started on Letairis 5mg in Dec after a RHC on 12.  She stopped Letaris due to swelling and does not want to take another medication in it's place. She does suffer from Raynaud's and has cold fingers but no wounds. Sees Dr. Handy regularly.       Her echo 2021 showed normal right ventricular size and function and EF 55%.   Mother had stents placed at 80 years of age. Father  early of cancer.    Follows with Dr. Perkins for Rheumatology  LOV 24: Cut amlodipine dose in half, from 5mg to 2.5mg     Pt was last seen in office 2024 and presents today for a follow up of PAH/CREST. Last EF 55% on 24. Had echo today, EF 50-55%. She does not check BP at home. No labs since October. Follows with Dr. Perkins. Here with her  today. She is not very active lately. She is going on a cruise next week.     Afib episode Monday, she felt it, lasted a couple hours. Swelling to feet occasionally but not often. Denies SOB. Denies chest pain, palpitations or dizziness.   She takes Eliquis.      NYHA Class:  II  No Known Allergies     Current Outpatient Medications on File Prior to Visit   Medication Sig Dispense Refill    alendronate (FOSAMAX) 70 MG tablet Take 1 tablet by mouth every 7 days 12 tablet 3    valsartan-hydroCHLOROthiazide (DIOVAN-HCT) 80-12.5 MG per tablet TAKE 1 TABLET DAILY (NEW   STRENGTH) 90 tablet 3    GINKGO BILOBA EXTRACT PO Take 40 mg by mouth daily      GARLIC PO Take 1 tablet by mouth daily      apixaban (ELIQUIS) 5 MG TABS tablet Take 1 tablet by mouth 2 times daily 180 tablet 3    hydroCHLOROthiazide (HYDRODIURIL) 25 MG tablet Take 1 tablet

## 2025-01-24 ENCOUNTER — HOSPITAL ENCOUNTER (OUTPATIENT)
Age: 75
Discharge: HOME | End: 2025-01-26
Attending: INTERNAL MEDICINE
Payer: COMMERCIAL

## 2025-01-24 ENCOUNTER — TELEPHONE (OUTPATIENT)
Dept: CARDIOLOGY CLINIC | Age: 75
End: 2025-01-24

## 2025-01-24 ENCOUNTER — OFFICE VISIT (OUTPATIENT)
Dept: CARDIOLOGY CLINIC | Age: 75
End: 2025-01-24
Payer: COMMERCIAL

## 2025-01-24 ENCOUNTER — HOSPITAL ENCOUNTER (OUTPATIENT)
Age: 75
Discharge: HOME | End: 2025-01-24
Attending: INTERNAL MEDICINE
Payer: COMMERCIAL

## 2025-01-24 VITALS
HEIGHT: 65 IN | HEART RATE: 73 BPM | SYSTOLIC BLOOD PRESSURE: 110 MMHG | BODY MASS INDEX: 31.99 KG/M2 | DIASTOLIC BLOOD PRESSURE: 66 MMHG | OXYGEN SATURATION: 99 % | WEIGHT: 192 LBS

## 2025-01-24 VITALS
SYSTOLIC BLOOD PRESSURE: 144 MMHG | WEIGHT: 185 LBS | BODY MASS INDEX: 30.82 KG/M2 | DIASTOLIC BLOOD PRESSURE: 74 MMHG | HEIGHT: 65 IN

## 2025-01-24 DIAGNOSIS — I27.21 PAH (PULMONARY ARTERY HYPERTENSION) (HCC): ICD-10-CM

## 2025-01-24 DIAGNOSIS — I48.0 PAF (PAROXYSMAL ATRIAL FIBRILLATION) (HCC): ICD-10-CM

## 2025-01-24 DIAGNOSIS — I73.00 RAYNAUD'S DISEASE WITHOUT GANGRENE: ICD-10-CM

## 2025-01-24 DIAGNOSIS — I27.20 PULMONARY HYPERTENSION (HCC): ICD-10-CM

## 2025-01-24 DIAGNOSIS — M34.1 CREST SYNDROME (HCC): ICD-10-CM

## 2025-01-24 DIAGNOSIS — R06.02 SOB (SHORTNESS OF BREATH): ICD-10-CM

## 2025-01-24 DIAGNOSIS — E78.00 PURE HYPERCHOLESTEROLEMIA: ICD-10-CM

## 2025-01-24 DIAGNOSIS — I10 ESSENTIAL HYPERTENSION: ICD-10-CM

## 2025-01-24 DIAGNOSIS — I27.21 PAH (PULMONARY ARTERY HYPERTENSION) (HCC): Primary | ICD-10-CM

## 2025-01-24 LAB
ALBUMIN SERPL-MCNC: 4.3 G/DL (ref 3.4–5)
ALBUMIN/GLOB SERPL: 1.8 {RATIO} (ref 1.1–2.2)
ALP SERPL-CCNC: 67 U/L (ref 40–129)
ALT SERPL-CCNC: 14 U/L (ref 10–40)
ANION GAP SERPL CALCULATED.3IONS-SCNC: 10 MMOL/L (ref 3–16)
AST SERPL-CCNC: 21 U/L (ref 15–37)
BILIRUB SERPL-MCNC: 0.3 MG/DL (ref 0–1)
BUN SERPL-MCNC: 19 MG/DL (ref 7–20)
CALCIUM SERPL-MCNC: 9.5 MG/DL (ref 8.3–10.6)
CHLORIDE SERPL-SCNC: 104 MMOL/L (ref 99–110)
CHOLEST SERPL-MCNC: 159 MG/DL (ref 0–199)
CO2 SERPL-SCNC: 28 MMOL/L (ref 21–32)
CREAT SERPL-MCNC: 0.8 MG/DL (ref 0.6–1.2)
DEPRECATED RDW RBC AUTO: 14.8 % (ref 12.4–15.4)
ECHO AO ASC DIAM: 3.3 CM
ECHO AO ASCENDING AORTA INDEX: 1.73 CM/M2
ECHO AO ROOT DIAM: 2.7 CM
ECHO AO ROOT INDEX: 1.41 CM/M2
ECHO AV AREA PEAK VELOCITY: 1.9 CM2
ECHO AV AREA VTI: 1.6 CM2
ECHO AV AREA/BSA PEAK VELOCITY: 1 CM2/M2
ECHO AV AREA/BSA VTI: 0.8 CM2/M2
ECHO AV MEAN GRADIENT: 7 MMHG
ECHO AV MEAN VELOCITY: 1.3 M/S
ECHO AV PEAK GRADIENT: 11 MMHG
ECHO AV PEAK VELOCITY: 1.7 M/S
ECHO AV VELOCITY RATIO: 0.71
ECHO AV VTI: 41.6 CM
ECHO BSA: 1.96 M2
ECHO EST RA PRESSURE: 3 MMHG
ECHO LA AREA 2C: 20.7 CM2
ECHO LA AREA 4C: 21.4 CM2
ECHO LA DIAMETER INDEX: 2.25 CM/M2
ECHO LA DIAMETER: 4.3 CM
ECHO LA MAJOR AXIS: 6.1 CM
ECHO LA MINOR AXIS: 5.6 CM
ECHO LA TO AORTIC ROOT RATIO: 1.59
ECHO LA VOL BP: 64 ML (ref 22–52)
ECHO LA VOL MOD A2C: 61 ML (ref 22–52)
ECHO LA VOL MOD A4C: 63 ML (ref 22–52)
ECHO LA VOL/BSA BIPLANE: 34 ML/M2 (ref 16–34)
ECHO LA VOLUME INDEX MOD A2C: 32 ML/M2 (ref 16–34)
ECHO LA VOLUME INDEX MOD A4C: 33 ML/M2 (ref 16–34)
ECHO LV E' LATERAL VELOCITY: 12.6 CM/S
ECHO LV E' SEPTAL VELOCITY: 6.74 CM/S
ECHO LV EDV A2C: 86 ML
ECHO LV EDV A4C: 75 ML
ECHO LV EDV INDEX A4C: 39 ML/M2
ECHO LV EDV NDEX A2C: 45 ML/M2
ECHO LV EF PHYSICIAN: 50 %
ECHO LV EJECTION FRACTION A2C: 57 %
ECHO LV EJECTION FRACTION A4C: 42 %
ECHO LV ESV A2C: 37 ML
ECHO LV ESV A4C: 44 ML
ECHO LV ESV INDEX A2C: 19 ML/M2
ECHO LV ESV INDEX A4C: 23 ML/M2
ECHO LV FRACTIONAL SHORTENING: 37 % (ref 28–44)
ECHO LV INTERNAL DIMENSION DIASTOLE INDEX: 2.41 CM/M2
ECHO LV INTERNAL DIMENSION DIASTOLIC: 4.6 CM (ref 3.9–5.3)
ECHO LV INTERNAL DIMENSION SYSTOLIC INDEX: 1.52 CM/M2
ECHO LV INTERNAL DIMENSION SYSTOLIC: 2.9 CM
ECHO LV IVSD: 0.9 CM (ref 0.6–0.9)
ECHO LV MASS 2D: 148.1 G (ref 67–162)
ECHO LV MASS INDEX 2D: 77.5 G/M2 (ref 43–95)
ECHO LV POSTERIOR WALL DIASTOLIC: 1 CM (ref 0.6–0.9)
ECHO LV RELATIVE WALL THICKNESS RATIO: 0.43
ECHO LVOT AREA: 2.5 CM2
ECHO LVOT AV VTI INDEX: 0.63
ECHO LVOT DIAM: 1.8 CM
ECHO LVOT MEAN GRADIENT: 4 MMHG
ECHO LVOT PEAK GRADIENT: 6 MMHG
ECHO LVOT PEAK VELOCITY: 1.2 M/S
ECHO LVOT STROKE VOLUME INDEX: 35 ML/M2
ECHO LVOT SV: 66.9 ML
ECHO LVOT VTI: 26.3 CM
ECHO MV A VELOCITY: 1.2 M/S
ECHO MV E VELOCITY: 0.72 M/S
ECHO MV E/A RATIO: 0.6
ECHO MV E/E' LATERAL: 5.71
ECHO MV E/E' RATIO (AVERAGED): 8.2
ECHO MV E/E' SEPTAL: 10.68
ECHO PV MAX VELOCITY: 0.9 M/S
ECHO PV PEAK GRADIENT: 3 MMHG
ECHO RA AREA 4C: 13.1 CM2
ECHO RA END SYSTOLIC VOLUME APICAL 4 CHAMBER INDEX BSA: 14 ML/M2
ECHO RA VOLUME: 27 ML
ECHO RIGHT VENTRICULAR SYSTOLIC PRESSURE (RVSP): 22 MMHG
ECHO RV BASAL DIMENSION: 3 CM
ECHO RV FREE WALL PEAK S': 11.7 CM/S
ECHO RV LONGITUDINAL DIMENSION: 6.6 CM
ECHO RV MID DIMENSION: 2 CM
ECHO RV TAPSE: 2.5 CM (ref 1.7–?)
ECHO TV REGURGITANT MAX VELOCITY: 2.19 M/S
ECHO TV REGURGITANT PEAK GRADIENT: 19 MMHG
GFR SERPLBLD CREATININE-BSD FMLA CKD-EPI: 76 ML/MIN/{1.73_M2}
GLUCOSE SERPL-MCNC: 95 MG/DL (ref 70–99)
HCT VFR BLD AUTO: 38.8 % (ref 36–48)
HDLC SERPL-MCNC: 56 MG/DL (ref 40–60)
HGB BLD-MCNC: 12.9 G/DL (ref 12–16)
LDL CHOLESTEROL: 82 MG/DL
MCH RBC QN AUTO: 29.3 PG (ref 26–34)
MCHC RBC AUTO-ENTMCNC: 33.2 G/DL (ref 31–36)
MCV RBC AUTO: 88.3 FL (ref 80–100)
NT-PROBNP SERPL-MCNC: 320 PG/ML (ref 0–449)
PLATELET # BLD AUTO: 176 K/UL (ref 135–450)
PMV BLD AUTO: 9.7 FL (ref 5–10.5)
POTASSIUM SERPL-SCNC: 3.6 MMOL/L (ref 3.5–5.1)
PROT SERPL-MCNC: 6.7 G/DL (ref 6.4–8.2)
RBC # BLD AUTO: 4.39 M/UL (ref 4–5.2)
SODIUM SERPL-SCNC: 142 MMOL/L (ref 136–145)
TRIGL SERPL-MCNC: 104 MG/DL (ref 0–150)
VLDLC SERPL CALC-MCNC: 21 MG/DL
WBC # BLD AUTO: 6 K/UL (ref 4–11)

## 2025-01-24 PROCEDURE — 1159F MED LIST DOCD IN RCRD: CPT | Performed by: INTERNAL MEDICINE

## 2025-01-24 PROCEDURE — 93306 TTE W/DOPPLER COMPLETE: CPT | Performed by: INTERNAL MEDICINE

## 2025-01-24 PROCEDURE — 3074F SYST BP LT 130 MM HG: CPT | Performed by: INTERNAL MEDICINE

## 2025-01-24 PROCEDURE — 85027 COMPLETE CBC AUTOMATED: CPT

## 2025-01-24 PROCEDURE — 3078F DIAST BP <80 MM HG: CPT | Performed by: INTERNAL MEDICINE

## 2025-01-24 PROCEDURE — 99215 OFFICE O/P EST HI 40 MIN: CPT | Performed by: INTERNAL MEDICINE

## 2025-01-24 PROCEDURE — 80053 COMPREHEN METABOLIC PANEL: CPT

## 2025-01-24 PROCEDURE — G2211 COMPLEX E/M VISIT ADD ON: HCPCS | Performed by: INTERNAL MEDICINE

## 2025-01-24 PROCEDURE — 93306 TTE W/DOPPLER COMPLETE: CPT

## 2025-01-24 PROCEDURE — 83880 ASSAY OF NATRIURETIC PEPTIDE: CPT

## 2025-01-24 PROCEDURE — 1123F ACP DISCUSS/DSCN MKR DOCD: CPT | Performed by: INTERNAL MEDICINE

## 2025-01-24 PROCEDURE — 36415 COLL VENOUS BLD VENIPUNCTURE: CPT

## 2025-01-24 PROCEDURE — 80061 LIPID PANEL: CPT

## 2025-01-24 NOTE — TELEPHONE ENCOUNTER
----- Message from Dr. Kavitha Delarosa MD sent at 1/24/2025  1:53 PM EST -----  Please call patient about canceling stress test.  MARQUIS Walker, here is the final reading on your echo from today.  Final reading 50-55% which is similar to the last one.  I think we can cancel the stress test, since it is not dramatically lower.  Kavitha Delarosa

## 2025-01-24 NOTE — TELEPHONE ENCOUNTER
Echo results came back. EF 50-55%, higher than preliminary numbers. Dilma Myoview is not needed. Calling pt to let her know. Pt states verbal understanding. Canceled myoview.

## 2025-01-24 NOTE — PATIENT INSTRUCTIONS
Continue current medications, no changes  If EF is lower on final summary, we will schedule chemical stress test  Fasting labs now: CBC, CMP, BNP, and LIPID  Follow up with Kavitha Delarosa MD in 6 months  Call my office for any worsening symptoms such as shortness of breath, chest pain, sudden weight gain or loss, or any increased [phone]

## 2025-02-19 ENCOUNTER — TELEPHONE (OUTPATIENT)
Dept: CARDIOLOGY CLINIC | Age: 75
End: 2025-02-19

## 2025-02-19 NOTE — TELEPHONE ENCOUNTER
Medication Refill    Medication needing refilled:  apixaban (ELIQUIS) 5 MG TABS tablet   Dosage of the medication:    How are you taking this medication (QD, BID, TID, QID, PRN):  Take 1 tablet by mouth 2 times daily   30 or 90 day supply called in:90    When will you run out of your medication:  Pt is out of this medication  Which Pharmacy are we sending the medication to?:  [facility] - [place] - [address] - P [phone] - F [phone]

## 2025-02-27 RX ORDER — FUROSEMIDE 20 MG/1
20 TABLET ORAL PRN
Qty: 90 TABLET | Refills: 3 | Status: SHIPPED | OUTPATIENT
Start: 2025-02-27

## 2025-02-27 NOTE — TELEPHONE ENCOUNTER
Received refill request for Furosemide 20 MG Tablet from [facility] pharmacy.     Last OV: 1/24/2025 MARQUIS    Next OV: 7/23/2025 MARQUIS    Last Labs: 1/24/2025 CMP    Last Filled: 8/6/2024 MARQUIS

## 2025-03-14 RX ORDER — HYDROCHLOROTHIAZIDE 25 MG/1
TABLET ORAL
Qty: 52 TABLET | Refills: 3 | Status: SHIPPED | OUTPATIENT
Start: 2025-03-14

## 2025-03-14 NOTE — TELEPHONE ENCOUNTER
Prescription refill    Last OV:01/24/2025    Last Refill:08/13/2024    Labs:01/24/2025    Future Appt:07/23/2025

## 2025-03-24 DIAGNOSIS — I73.00 RAYNAUD'S DISEASE WITHOUT GANGRENE: ICD-10-CM

## 2025-03-24 DIAGNOSIS — M34.1 CREST SYNDROME (HCC): ICD-10-CM

## 2025-03-24 DIAGNOSIS — R06.02 SOB (SHORTNESS OF BREATH): ICD-10-CM

## 2025-03-24 DIAGNOSIS — E78.00 PURE HYPERCHOLESTEROLEMIA: ICD-10-CM

## 2025-03-24 DIAGNOSIS — I10 ESSENTIAL HYPERTENSION: ICD-10-CM

## 2025-03-24 DIAGNOSIS — I27.21 PAH (PULMONARY ARTERY HYPERTENSION) (HCC): ICD-10-CM

## 2025-03-24 RX ORDER — AMLODIPINE BESYLATE 5 MG/1
5 TABLET ORAL DAILY
Qty: 90 TABLET | Refills: 3 | Status: SHIPPED | OUTPATIENT
Start: 2025-03-24

## 2025-03-24 NOTE — TELEPHONE ENCOUNTER
Prescription refill:  Requested Prescriptions     Pending Prescriptions Disp Refills    amLODIPine (NORVASC) 5 MG tablet [Pharmacy Med Name: AMLODIPINE TAB 5MG] 90 tablet 3     Sig: TAKE 1 TABLET DAILY       Refill parameters per protocol were met    Last OV: 1/24/2025     Future Appt: 7/23/2025     LABS: 1/24/25

## 2025-06-20 DIAGNOSIS — I10 ESSENTIAL HYPERTENSION: ICD-10-CM

## 2025-06-20 DIAGNOSIS — E78.00 PURE HYPERCHOLESTEROLEMIA: ICD-10-CM

## 2025-06-20 DIAGNOSIS — R06.02 SOB (SHORTNESS OF BREATH): ICD-10-CM

## 2025-06-20 DIAGNOSIS — I73.00 RAYNAUD'S DISEASE WITHOUT GANGRENE: ICD-10-CM

## 2025-06-20 DIAGNOSIS — I27.21 PAH (PULMONARY ARTERY HYPERTENSION) (HCC): ICD-10-CM

## 2025-06-20 DIAGNOSIS — M34.1 CREST SYNDROME (HCC): ICD-10-CM

## 2025-06-23 RX ORDER — METOPROLOL SUCCINATE 25 MG/1
25 TABLET, EXTENDED RELEASE ORAL
Qty: 90 TABLET | Refills: 3 | Status: SHIPPED | OUTPATIENT
Start: 2025-06-23

## 2025-06-23 RX ORDER — ATORVASTATIN CALCIUM 10 MG/1
10 TABLET, FILM COATED ORAL DAILY
Qty: 90 TABLET | Refills: 3 | Status: SHIPPED | OUTPATIENT
Start: 2025-06-23

## 2025-06-23 NOTE — TELEPHONE ENCOUNTER
Requested Prescriptions     Pending Prescriptions Disp Refills    metoprolol succinate (TOPROL XL) 25 MG extended release tablet [Pharmacy Med Name: METOPROL SUC TAB 25MG ER] 90 tablet 3     Sig: TAKE 1 TABLET NIGHTLY      Last OV:  2025 MARQUIS    Next OV: 2025 MARQUIS    Last EK2024    Last Filled: 2024 RMM

## 2025-06-23 NOTE — TELEPHONE ENCOUNTER
Prescription refill:  Requested Prescriptions     Pending Prescriptions Disp Refills    atorvastatin (LIPITOR) 10 MG tablet [Pharmacy Med Name: ATORVASTATIN TAB 10MG] 90 tablet 3     Sig: TAKE 1 TABLET DAILY       Refill parameters per protocol were met    Last OV: 1/24/2025     Future Appt: 7/23/2025     LABS: 1/24/25

## 2025-07-03 ENCOUNTER — TELEPHONE (OUTPATIENT)
Dept: CARDIOLOGY CLINIC | Age: 75
End: 2025-07-03

## 2025-07-15 ENCOUNTER — HOSPITAL ENCOUNTER (OUTPATIENT)
Age: 75
Discharge: HOME | End: 2025-07-15
Payer: COMMERCIAL

## 2025-07-15 ENCOUNTER — HOSPITAL ENCOUNTER (OUTPATIENT)
Dept: GENERAL RADIOLOGY | Age: 75
Discharge: HOME | End: 2025-07-15
Payer: COMMERCIAL

## 2025-07-15 DIAGNOSIS — M25.559 HIP PAIN, UNSPECIFIED LATERALITY: ICD-10-CM

## 2025-07-15 DIAGNOSIS — M54.50 LUMBAR PAIN: ICD-10-CM

## 2025-07-15 PROCEDURE — 72100 X-RAY EXAM L-S SPINE 2/3 VWS: CPT

## 2025-07-15 PROCEDURE — 72202 X-RAY EXAM SI JOINTS 3/> VWS: CPT

## 2025-07-15 PROCEDURE — 73522 X-RAY EXAM HIPS BI 3-4 VIEWS: CPT

## 2025-07-23 ENCOUNTER — HOSPITAL ENCOUNTER (OUTPATIENT)
Age: 75
Discharge: HOME | End: 2025-07-23
Payer: COMMERCIAL

## 2025-07-23 ENCOUNTER — OFFICE VISIT (OUTPATIENT)
Dept: CARDIOLOGY CLINIC | Age: 75
End: 2025-07-23
Payer: COMMERCIAL

## 2025-07-23 VITALS
BODY MASS INDEX: 31.99 KG/M2 | HEART RATE: 83 BPM | SYSTOLIC BLOOD PRESSURE: 130 MMHG | WEIGHT: 192 LBS | OXYGEN SATURATION: 97 % | DIASTOLIC BLOOD PRESSURE: 74 MMHG | HEIGHT: 65 IN

## 2025-07-23 DIAGNOSIS — R06.02 SOB (SHORTNESS OF BREATH): ICD-10-CM

## 2025-07-23 DIAGNOSIS — I27.21 PAH (PULMONARY ARTERY HYPERTENSION) (HCC): ICD-10-CM

## 2025-07-23 DIAGNOSIS — E78.00 PURE HYPERCHOLESTEROLEMIA: ICD-10-CM

## 2025-07-23 DIAGNOSIS — I27.21 PAH (PULMONARY ARTERY HYPERTENSION) (HCC): Primary | ICD-10-CM

## 2025-07-23 DIAGNOSIS — M34.1 CREST SYNDROME (HCC): ICD-10-CM

## 2025-07-23 DIAGNOSIS — I48.0 PAF (PAROXYSMAL ATRIAL FIBRILLATION) (HCC): ICD-10-CM

## 2025-07-23 DIAGNOSIS — I10 ESSENTIAL HYPERTENSION: ICD-10-CM

## 2025-07-23 LAB
ALBUMIN SERPL-MCNC: 4.2 G/DL (ref 3.4–5)
ALBUMIN/GLOB SERPL: 1.6 {RATIO} (ref 1.1–2.2)
ALP SERPL-CCNC: 71 U/L (ref 40–129)
ALT SERPL-CCNC: 17 U/L (ref 10–40)
ANION GAP SERPL CALCULATED.3IONS-SCNC: 13 MMOL/L (ref 3–16)
AST SERPL-CCNC: 23 U/L (ref 15–37)
BILIRUB SERPL-MCNC: 0.4 MG/DL (ref 0–1)
BUN SERPL-MCNC: 16 MG/DL (ref 7–20)
CALCIUM SERPL-MCNC: 9.8 MG/DL (ref 8.3–10.6)
CHLORIDE SERPL-SCNC: 105 MMOL/L (ref 99–110)
CHOLEST SERPL-MCNC: 158 MG/DL (ref 0–199)
CO2 SERPL-SCNC: 27 MMOL/L (ref 21–32)
CREAT SERPL-MCNC: 0.9 MG/DL (ref 0.6–1.2)
CREAT UR-MCNC: 213 MG/DL (ref 28–259)
DEPRECATED RDW RBC AUTO: 14.4 % (ref 12.4–15.4)
GFR SERPLBLD CREATININE-BSD FMLA CKD-EPI: 66 ML/MIN/{1.73_M2}
GLUCOSE SERPL-MCNC: 92 MG/DL (ref 70–99)
HCT VFR BLD AUTO: 35.9 % (ref 36–48)
HDLC SERPL-MCNC: 56 MG/DL (ref 40–60)
HGB BLD-MCNC: 12.2 G/DL (ref 12–16)
LDL CHOLESTEROL: 79 MG/DL
MCH RBC QN AUTO: 29.2 PG (ref 26–34)
MCHC RBC AUTO-ENTMCNC: 34.1 G/DL (ref 31–36)
MCV RBC AUTO: 85.6 FL (ref 80–100)
NT-PROBNP SERPL-MCNC: 444 PG/ML (ref 0–449)
PLATELET # BLD AUTO: 164 K/UL (ref 135–450)
PMV BLD AUTO: 9.1 FL (ref 5–10.5)
POTASSIUM SERPL-SCNC: 3.7 MMOL/L (ref 3.5–5.1)
PROT SERPL-MCNC: 6.8 G/DL (ref 6.4–8.2)
PROT UR-MCNC: 23.2 MG/DL
PROT/CREAT UR-RTO: 0.1 MG/DL
RBC # BLD AUTO: 4.19 M/UL (ref 4–5.2)
SODIUM SERPL-SCNC: 145 MMOL/L (ref 136–145)
TRIGL SERPL-MCNC: 116 MG/DL (ref 0–150)
VLDLC SERPL CALC-MCNC: 23 MG/DL
WBC # BLD AUTO: 5.6 K/UL (ref 4–11)

## 2025-07-23 PROCEDURE — 80053 COMPREHEN METABOLIC PANEL: CPT

## 2025-07-23 PROCEDURE — 99215 OFFICE O/P EST HI 40 MIN: CPT | Performed by: INTERNAL MEDICINE

## 2025-07-23 PROCEDURE — 1123F ACP DISCUSS/DSCN MKR DOCD: CPT | Performed by: INTERNAL MEDICINE

## 2025-07-23 PROCEDURE — 84156 ASSAY OF PROTEIN URINE: CPT

## 2025-07-23 PROCEDURE — 82570 ASSAY OF URINE CREATININE: CPT

## 2025-07-23 PROCEDURE — 3078F DIAST BP <80 MM HG: CPT | Performed by: INTERNAL MEDICINE

## 2025-07-23 PROCEDURE — G2211 COMPLEX E/M VISIT ADD ON: HCPCS | Performed by: INTERNAL MEDICINE

## 2025-07-23 PROCEDURE — 83880 ASSAY OF NATRIURETIC PEPTIDE: CPT

## 2025-07-23 PROCEDURE — 80061 LIPID PANEL: CPT

## 2025-07-23 PROCEDURE — 85027 COMPLETE CBC AUTOMATED: CPT

## 2025-07-23 PROCEDURE — 1159F MED LIST DOCD IN RCRD: CPT | Performed by: INTERNAL MEDICINE

## 2025-07-23 PROCEDURE — 36415 COLL VENOUS BLD VENIPUNCTURE: CPT

## 2025-07-23 PROCEDURE — 3075F SYST BP GE 130 - 139MM HG: CPT | Performed by: INTERNAL MEDICINE

## (undated) DEVICE — SNARE COLD DIAMOND 10MM THIN

## (undated) DEVICE — SNARE ENDOSCP L240CM LOOP W13MM DIA2.4MM SHT THROW SM OVL

## (undated) DEVICE — MOUTHPIECE ENDOSCP L CTRL OPN AND SIDE PORTS DISP

## (undated) DEVICE — VALVE SUCTION AIR H2O SET ORCA POD + DISP

## (undated) DEVICE — SOLUTION IV IRRIG WATER 500ML POUR BRL ST 2F7113

## (undated) DEVICE — FORCEPS BX L240CM DIA2.4MM L NDL RAD JAW 4 133340

## (undated) DEVICE — AIR/WATER CLEANING ADAPTER FOR OLYMPUS® GI ENDOSCOPE: Brand: BULLDOG®

## (undated) DEVICE — BW-412T DISP COMBO CLEANING BRUSH: Brand: SINGLE USE COMBINATION CLEANING BRUSH

## (undated) DEVICE — ENDOSCOPIC KIT 6X3/16 FT COLON W/ 1.1 OZ 2 GWN W/O BRSH

## (undated) DEVICE — FORCEPS BX L240CM WRK CHN 2.8MM STD CAP W/ NDL MIC MESH